# Patient Record
Sex: FEMALE | Race: WHITE | NOT HISPANIC OR LATINO
[De-identification: names, ages, dates, MRNs, and addresses within clinical notes are randomized per-mention and may not be internally consistent; named-entity substitution may affect disease eponyms.]

---

## 2017-02-28 ENCOUNTER — APPOINTMENT (OUTPATIENT)
Dept: OPHTHALMOLOGY | Facility: CLINIC | Age: 47
End: 2017-02-28

## 2020-09-17 ENCOUNTER — INPATIENT (INPATIENT)
Facility: HOSPITAL | Age: 50
LOS: 1 days | Discharge: HOME | End: 2020-09-19
Attending: INTERNAL MEDICINE | Admitting: INTERNAL MEDICINE
Payer: MEDICAID

## 2020-09-17 VITALS
OXYGEN SATURATION: 100 % | TEMPERATURE: 97 F | RESPIRATION RATE: 18 BRPM | HEART RATE: 94 BPM | SYSTOLIC BLOOD PRESSURE: 188 MMHG | DIASTOLIC BLOOD PRESSURE: 64 MMHG

## 2020-09-17 DIAGNOSIS — Z90.79 ACQUIRED ABSENCE OF OTHER GENITAL ORGAN(S): Chronic | ICD-10-CM

## 2020-09-17 DIAGNOSIS — Z90.49 ACQUIRED ABSENCE OF OTHER SPECIFIED PARTS OF DIGESTIVE TRACT: Chronic | ICD-10-CM

## 2020-09-17 DIAGNOSIS — E87.1 HYPO-OSMOLALITY AND HYPONATREMIA: ICD-10-CM

## 2020-09-17 DIAGNOSIS — Z90.09 ACQUIRED ABSENCE OF OTHER PART OF HEAD AND NECK: Chronic | ICD-10-CM

## 2020-09-17 DIAGNOSIS — Z29.9 ENCOUNTER FOR PROPHYLACTIC MEASURES, UNSPECIFIED: ICD-10-CM

## 2020-09-17 DIAGNOSIS — I10 ESSENTIAL (PRIMARY) HYPERTENSION: ICD-10-CM

## 2020-09-17 DIAGNOSIS — E11.9 TYPE 2 DIABETES MELLITUS WITHOUT COMPLICATIONS: ICD-10-CM

## 2020-09-17 DIAGNOSIS — R10.9 UNSPECIFIED ABDOMINAL PAIN: ICD-10-CM

## 2020-09-17 DIAGNOSIS — E89.0 POSTPROCEDURAL HYPOTHYROIDISM: ICD-10-CM

## 2020-09-17 DIAGNOSIS — Z02.9 ENCOUNTER FOR ADMINISTRATIVE EXAMINATIONS, UNSPECIFIED: ICD-10-CM

## 2020-09-17 LAB
ALBUMIN SERPL ELPH-MCNC: 4.8 G/DL — SIGNIFICANT CHANGE UP (ref 3.3–5)
ALP SERPL-CCNC: 88 U/L — SIGNIFICANT CHANGE UP (ref 40–120)
ALT FLD-CCNC: 15 U/L — SIGNIFICANT CHANGE UP (ref 4–33)
ANION GAP SERPL CALC-SCNC: 14 MMO/L — SIGNIFICANT CHANGE UP (ref 7–14)
ANION GAP SERPL CALC-SCNC: 20 MMO/L — HIGH (ref 7–14)
ANION GAP SERPL CALC-SCNC: 23 MMO/L — HIGH (ref 7–14)
APPEARANCE UR: CLEAR — SIGNIFICANT CHANGE UP
AST SERPL-CCNC: 16 U/L — SIGNIFICANT CHANGE UP (ref 4–32)
BASE EXCESS BLDV CALC-SCNC: -1.6 MMOL/L — SIGNIFICANT CHANGE UP
BILIRUB SERPL-MCNC: 0.4 MG/DL — SIGNIFICANT CHANGE UP (ref 0.2–1.2)
BILIRUB UR-MCNC: NEGATIVE — SIGNIFICANT CHANGE UP
BLOOD GAS VENOUS - CREATININE: 1.23 MG/DL — SIGNIFICANT CHANGE UP (ref 0.5–1.3)
BLOOD GAS VENOUS - FIO2: 21 — SIGNIFICANT CHANGE UP
BLOOD UR QL VISUAL: NEGATIVE — SIGNIFICANT CHANGE UP
BUN SERPL-MCNC: 12 MG/DL — SIGNIFICANT CHANGE UP (ref 7–23)
BUN SERPL-MCNC: 13 MG/DL — SIGNIFICANT CHANGE UP (ref 7–23)
BUN SERPL-MCNC: 16 MG/DL — SIGNIFICANT CHANGE UP (ref 7–23)
CALCIUM SERPL-MCNC: 8.4 MG/DL — SIGNIFICANT CHANGE UP (ref 8.4–10.5)
CALCIUM SERPL-MCNC: 8.8 MG/DL — SIGNIFICANT CHANGE UP (ref 8.4–10.5)
CALCIUM SERPL-MCNC: 9 MG/DL — SIGNIFICANT CHANGE UP (ref 8.4–10.5)
CHLORIDE BLDV-SCNC: 86 MMOL/L — LOW (ref 96–108)
CHLORIDE SERPL-SCNC: 75 MMOL/L — LOW (ref 98–107)
CHLORIDE SERPL-SCNC: 81 MMOL/L — LOW (ref 98–107)
CHLORIDE SERPL-SCNC: 96 MMOL/L — LOW (ref 98–107)
CO2 SERPL-SCNC: 19 MMOL/L — LOW (ref 22–31)
CO2 SERPL-SCNC: 21 MMOL/L — LOW (ref 22–31)
CO2 SERPL-SCNC: 23 MMOL/L — SIGNIFICANT CHANGE UP (ref 22–31)
COLOR SPEC: COLORLESS — SIGNIFICANT CHANGE UP
CREAT ?TM UR-MCNC: 22.8 MG/DL — SIGNIFICANT CHANGE UP
CREAT SERPL-MCNC: 1.03 MG/DL — SIGNIFICANT CHANGE UP (ref 0.5–1.3)
CREAT SERPL-MCNC: 1.18 MG/DL — SIGNIFICANT CHANGE UP (ref 0.5–1.3)
CREAT SERPL-MCNC: 1.22 MG/DL — SIGNIFICANT CHANGE UP (ref 0.5–1.3)
GAS PNL BLDV: 119 MMOL/L — CRITICAL LOW (ref 136–146)
GLUCOSE BLDC GLUCOMTR-MCNC: 157 MG/DL — HIGH (ref 70–99)
GLUCOSE BLDV-MCNC: 222 MG/DL — HIGH (ref 70–99)
GLUCOSE SERPL-MCNC: 218 MG/DL — HIGH (ref 70–99)
GLUCOSE SERPL-MCNC: 229 MG/DL — HIGH (ref 70–99)
GLUCOSE SERPL-MCNC: 250 MG/DL — HIGH (ref 70–99)
GLUCOSE UR-MCNC: 300 — HIGH
HCG UR-SCNC: NEGATIVE — SIGNIFICANT CHANGE UP
HCO3 BLDV-SCNC: 23 MMOL/L — SIGNIFICANT CHANGE UP (ref 20–27)
HCT VFR BLD CALC: 36.8 % — SIGNIFICANT CHANGE UP (ref 34.5–45)
HCT VFR BLDV CALC: 38.1 % — SIGNIFICANT CHANGE UP (ref 34.5–45)
HGB BLD-MCNC: 12.5 G/DL — SIGNIFICANT CHANGE UP (ref 11.5–15.5)
HGB BLDV-MCNC: 12.4 G/DL — SIGNIFICANT CHANGE UP (ref 11.5–15.5)
KETONES UR-MCNC: SIGNIFICANT CHANGE UP
LACTATE BLDV-MCNC: 3.4 MMOL/L — HIGH (ref 0.5–2)
LACTATE BLDV-MCNC: 4.9 MMOL/L — CRITICAL HIGH (ref 0.5–2)
LEUKOCYTE ESTERASE UR-ACNC: NEGATIVE — SIGNIFICANT CHANGE UP
MAGNESIUM SERPL-MCNC: 1.3 MG/DL — LOW (ref 1.6–2.6)
MAGNESIUM SERPL-MCNC: 1.7 MG/DL — SIGNIFICANT CHANGE UP (ref 1.6–2.6)
MCHC RBC-ENTMCNC: 28.4 PG — SIGNIFICANT CHANGE UP (ref 27–34)
MCHC RBC-ENTMCNC: 34 % — SIGNIFICANT CHANGE UP (ref 32–36)
MCV RBC AUTO: 83.6 FL — SIGNIFICANT CHANGE UP (ref 80–100)
NITRITE UR-MCNC: NEGATIVE — SIGNIFICANT CHANGE UP
NRBC # FLD: 0 K/UL — SIGNIFICANT CHANGE UP (ref 0–0)
OSMOLALITY UR: 144 MOSMO/KG — SIGNIFICANT CHANGE UP (ref 50–1200)
PCO2 BLDV: 40 MMHG — LOW (ref 41–51)
PH BLDV: 7.38 PH — SIGNIFICANT CHANGE UP (ref 7.32–7.43)
PH UR: 6.5 — SIGNIFICANT CHANGE UP (ref 5–8)
PHOSPHATE SERPL-MCNC: 2.2 MG/DL — LOW (ref 2.5–4.5)
PHOSPHATE SERPL-MCNC: 2.6 MG/DL — SIGNIFICANT CHANGE UP (ref 2.5–4.5)
PLATELET # BLD AUTO: 259 K/UL — SIGNIFICANT CHANGE UP (ref 150–400)
PMV BLD: 12.6 FL — SIGNIFICANT CHANGE UP (ref 7–13)
PO2 BLDV: 39 MMHG — SIGNIFICANT CHANGE UP (ref 35–40)
POTASSIUM BLDV-SCNC: 3.2 MMOL/L — LOW (ref 3.4–4.5)
POTASSIUM SERPL-MCNC: 3.2 MMOL/L — LOW (ref 3.5–5.3)
POTASSIUM SERPL-MCNC: 4 MMOL/L — SIGNIFICANT CHANGE UP (ref 3.5–5.3)
POTASSIUM SERPL-MCNC: 4 MMOL/L — SIGNIFICANT CHANGE UP (ref 3.5–5.3)
POTASSIUM SERPL-SCNC: 3.2 MMOL/L — LOW (ref 3.5–5.3)
POTASSIUM SERPL-SCNC: 4 MMOL/L — SIGNIFICANT CHANGE UP (ref 3.5–5.3)
POTASSIUM SERPL-SCNC: 4 MMOL/L — SIGNIFICANT CHANGE UP (ref 3.5–5.3)
PROT SERPL-MCNC: 8 G/DL — SIGNIFICANT CHANGE UP (ref 6–8.3)
PROT UR-MCNC: NEGATIVE — SIGNIFICANT CHANGE UP
RBC # BLD: 4.4 M/UL — SIGNIFICANT CHANGE UP (ref 3.8–5.2)
RBC # FLD: 12.5 % — SIGNIFICANT CHANGE UP (ref 10.3–14.5)
SAO2 % BLDV: 69.2 % — SIGNIFICANT CHANGE UP (ref 60–85)
SARS-COV-2 RNA SPEC QL NAA+PROBE: SIGNIFICANT CHANGE UP
SODIUM SERPL-SCNC: 119 MMOL/L — CRITICAL LOW (ref 135–145)
SODIUM SERPL-SCNC: 120 MMOL/L — CRITICAL LOW (ref 135–145)
SODIUM SERPL-SCNC: 133 MMOL/L — LOW (ref 135–145)
SODIUM UR-SCNC: 21 MMOL/L — SIGNIFICANT CHANGE UP
SP GR SPEC: 1 — SIGNIFICANT CHANGE UP (ref 1–1.04)
SP GR UR: 1 — LOW (ref 1–1.04)
UROBILINOGEN FLD QL: NORMAL — SIGNIFICANT CHANGE UP
WBC # BLD: 14.85 K/UL — HIGH (ref 3.8–10.5)
WBC # FLD AUTO: 14.85 K/UL — HIGH (ref 3.8–10.5)

## 2020-09-17 PROCEDURE — 99285 EMERGENCY DEPT VISIT HI MDM: CPT

## 2020-09-17 PROCEDURE — 74176 CT ABD & PELVIS W/O CONTRAST: CPT | Mod: 26

## 2020-09-17 PROCEDURE — 99223 1ST HOSP IP/OBS HIGH 75: CPT | Mod: GC

## 2020-09-17 RX ORDER — POTASSIUM CHLORIDE 20 MEQ
40 PACKET (EA) ORAL EVERY 4 HOURS
Refills: 0 | Status: COMPLETED | OUTPATIENT
Start: 2020-09-17 | End: 2020-09-17

## 2020-09-17 RX ORDER — DILTIAZEM HCL 120 MG
180 CAPSULE, EXT RELEASE 24 HR ORAL DAILY
Refills: 0 | Status: DISCONTINUED | OUTPATIENT
Start: 2020-09-17 | End: 2020-09-19

## 2020-09-17 RX ORDER — DEXTROSE 50 % IN WATER 50 %
12.5 SYRINGE (ML) INTRAVENOUS ONCE
Refills: 0 | Status: DISCONTINUED | OUTPATIENT
Start: 2020-09-17 | End: 2020-09-19

## 2020-09-17 RX ORDER — INFLUENZA VIRUS VACCINE 15; 15; 15; 15 UG/.5ML; UG/.5ML; UG/.5ML; UG/.5ML
0.5 SUSPENSION INTRAMUSCULAR ONCE
Refills: 0 | Status: DISCONTINUED | OUTPATIENT
Start: 2020-09-17 | End: 2020-09-19

## 2020-09-17 RX ORDER — HEPARIN SODIUM 5000 [USP'U]/ML
5000 INJECTION INTRAVENOUS; SUBCUTANEOUS EVERY 8 HOURS
Refills: 0 | Status: DISCONTINUED | OUTPATIENT
Start: 2020-09-17 | End: 2020-09-19

## 2020-09-17 RX ORDER — MORPHINE SULFATE 50 MG/1
2 CAPSULE, EXTENDED RELEASE ORAL ONCE
Refills: 0 | Status: DISCONTINUED | OUTPATIENT
Start: 2020-09-17 | End: 2020-09-17

## 2020-09-17 RX ORDER — INSULIN LISPRO 100/ML
VIAL (ML) SUBCUTANEOUS
Refills: 0 | Status: DISCONTINUED | OUTPATIENT
Start: 2020-09-17 | End: 2020-09-19

## 2020-09-17 RX ORDER — LOSARTAN POTASSIUM 100 MG/1
100 TABLET, FILM COATED ORAL DAILY
Refills: 0 | Status: DISCONTINUED | OUTPATIENT
Start: 2020-09-17 | End: 2020-09-19

## 2020-09-17 RX ORDER — DEXTROSE 50 % IN WATER 50 %
25 SYRINGE (ML) INTRAVENOUS ONCE
Refills: 0 | Status: DISCONTINUED | OUTPATIENT
Start: 2020-09-17 | End: 2020-09-19

## 2020-09-17 RX ORDER — LEVOTHYROXINE SODIUM 125 MCG
150 TABLET ORAL DAILY
Refills: 0 | Status: DISCONTINUED | OUTPATIENT
Start: 2020-09-17 | End: 2020-09-17

## 2020-09-17 RX ORDER — SODIUM CHLORIDE 9 MG/ML
1000 INJECTION, SOLUTION INTRAVENOUS
Refills: 0 | Status: DISCONTINUED | OUTPATIENT
Start: 2020-09-17 | End: 2020-09-19

## 2020-09-17 RX ORDER — LEVOTHYROXINE SODIUM 125 MCG
150 TABLET ORAL
Refills: 0 | Status: DISCONTINUED | OUTPATIENT
Start: 2020-09-17 | End: 2020-09-19

## 2020-09-17 RX ORDER — GLUCAGON INJECTION, SOLUTION 0.5 MG/.1ML
1 INJECTION, SOLUTION SUBCUTANEOUS ONCE
Refills: 0 | Status: DISCONTINUED | OUTPATIENT
Start: 2020-09-17 | End: 2020-09-19

## 2020-09-17 RX ORDER — CEFTRIAXONE 500 MG/1
1000 INJECTION, POWDER, FOR SOLUTION INTRAMUSCULAR; INTRAVENOUS ONCE
Refills: 0 | Status: COMPLETED | OUTPATIENT
Start: 2020-09-17 | End: 2020-09-17

## 2020-09-17 RX ORDER — SODIUM CHLORIDE 9 MG/ML
1000 INJECTION, SOLUTION INTRAVENOUS ONCE
Refills: 0 | Status: COMPLETED | OUTPATIENT
Start: 2020-09-17 | End: 2020-09-17

## 2020-09-17 RX ORDER — IBUPROFEN 200 MG
400 TABLET ORAL ONCE
Refills: 0 | Status: COMPLETED | OUTPATIENT
Start: 2020-09-17 | End: 2020-09-17

## 2020-09-17 RX ORDER — MAGNESIUM SULFATE 500 MG/ML
1 VIAL (ML) INJECTION ONCE
Refills: 0 | Status: COMPLETED | OUTPATIENT
Start: 2020-09-17 | End: 2020-09-17

## 2020-09-17 RX ORDER — LEVOTHYROXINE SODIUM 125 MCG
175 TABLET ORAL
Refills: 0 | Status: DISCONTINUED | OUTPATIENT
Start: 2020-09-17 | End: 2020-09-19

## 2020-09-17 RX ORDER — ACETAMINOPHEN 500 MG
650 TABLET ORAL ONCE
Refills: 0 | Status: COMPLETED | OUTPATIENT
Start: 2020-09-17 | End: 2020-09-17

## 2020-09-17 RX ORDER — SODIUM CHLORIDE 9 MG/ML
1000 INJECTION, SOLUTION INTRAVENOUS
Refills: 0 | Status: DISCONTINUED | OUTPATIENT
Start: 2020-09-17 | End: 2020-09-18

## 2020-09-17 RX ORDER — INSULIN LISPRO 100/ML
VIAL (ML) SUBCUTANEOUS AT BEDTIME
Refills: 0 | Status: DISCONTINUED | OUTPATIENT
Start: 2020-09-17 | End: 2020-09-19

## 2020-09-17 RX ORDER — DEXTROSE 50 % IN WATER 50 %
15 SYRINGE (ML) INTRAVENOUS ONCE
Refills: 0 | Status: DISCONTINUED | OUTPATIENT
Start: 2020-09-17 | End: 2020-09-19

## 2020-09-17 RX ORDER — SODIUM CHLORIDE 9 MG/ML
1000 INJECTION INTRAMUSCULAR; INTRAVENOUS; SUBCUTANEOUS ONCE
Refills: 0 | Status: COMPLETED | OUTPATIENT
Start: 2020-09-17 | End: 2020-09-17

## 2020-09-17 RX ADMIN — CEFTRIAXONE 100 MILLIGRAM(S): 500 INJECTION, POWDER, FOR SOLUTION INTRAMUSCULAR; INTRAVENOUS at 03:33

## 2020-09-17 RX ADMIN — MORPHINE SULFATE 2 MILLIGRAM(S): 50 CAPSULE, EXTENDED RELEASE ORAL at 06:27

## 2020-09-17 RX ADMIN — Medication 650 MILLIGRAM(S): at 04:04

## 2020-09-17 RX ADMIN — Medication 180 MILLIGRAM(S): at 21:14

## 2020-09-17 RX ADMIN — SODIUM CHLORIDE 1000 MILLILITER(S): 9 INJECTION, SOLUTION INTRAVENOUS at 05:53

## 2020-09-17 RX ADMIN — Medication 40 MILLIEQUIVALENT(S): at 04:52

## 2020-09-17 RX ADMIN — Medication 40 MILLIEQUIVALENT(S): at 09:33

## 2020-09-17 RX ADMIN — Medication 650 MILLIGRAM(S): at 03:32

## 2020-09-17 RX ADMIN — SODIUM CHLORIDE 1000 MILLILITER(S): 9 INJECTION INTRAMUSCULAR; INTRAVENOUS; SUBCUTANEOUS at 03:33

## 2020-09-17 RX ADMIN — SODIUM CHLORIDE 1000 MILLILITER(S): 9 INJECTION, SOLUTION INTRAVENOUS at 05:57

## 2020-09-17 RX ADMIN — SODIUM CHLORIDE 1000 MILLILITER(S): 9 INJECTION, SOLUTION INTRAVENOUS at 07:28

## 2020-09-17 RX ADMIN — CEFTRIAXONE 1000 MILLIGRAM(S): 500 INJECTION, POWDER, FOR SOLUTION INTRAMUSCULAR; INTRAVENOUS at 04:04

## 2020-09-17 RX ADMIN — MORPHINE SULFATE 2 MILLIGRAM(S): 50 CAPSULE, EXTENDED RELEASE ORAL at 05:53

## 2020-09-17 RX ADMIN — SODIUM CHLORIDE 1000 MILLILITER(S): 9 INJECTION, SOLUTION INTRAVENOUS at 04:03

## 2020-09-17 RX ADMIN — SODIUM CHLORIDE 1000 MILLILITER(S): 9 INJECTION INTRAMUSCULAR; INTRAVENOUS; SUBCUTANEOUS at 04:56

## 2020-09-17 RX ADMIN — SODIUM CHLORIDE 50 MILLILITER(S): 9 INJECTION, SOLUTION INTRAVENOUS at 23:10

## 2020-09-17 RX ADMIN — Medication 100 GRAM(S): at 07:02

## 2020-09-17 RX ADMIN — HEPARIN SODIUM 5000 UNIT(S): 5000 INJECTION INTRAVENOUS; SUBCUTANEOUS at 21:13

## 2020-09-17 NOTE — H&P ADULT - PROBLEM SELECTOR PLAN 2
- Recently diagnosed with UTI, treated with 10 days of nitrofurantoin. Symptoms persisted and patient was started on ciprofloxacin, on cipro for 2 days.   - dysuria resolved but now is having some mild flank pain/CVA tenderness concerning for pyeloneprhitis  - UA negative   - UCx pending  - CT a/p without contrast showed no stones, mild R hydronephrosis. ?pyelonephritis.   - received one dose of ceftriaxone in ED - Recently diagnosed with UTI, treated with 10 days of nitrofurantoin. Symptoms persisted and patient was started on ciprofloxacin, on cipro for 2 days.   -dysuria resolved but now is having some mild flank pain/CVA tenderness concerning for pyelonephritis  - UA negative   - UCx pending  - CT a/p without contrast showed no stones, mild R hydronephrosis. Unable to assess for pyelonephritis because noncontrast study.  - received one dose of ceftriaxone in ED. Will observe off antibiotics for now, given mild flank pain, negative UA.

## 2020-09-17 NOTE — ED ADULT NURSE REASSESSMENT NOTE - NS ED NURSE REASSESS COMMENT FT1
covering primary RN for break pt is in bed A and XO 3 in NAD resting comfortably, endorses no complaints, comfort measures provided, bed assignment noted

## 2020-09-17 NOTE — H&P ADULT - PROBLEM SELECTOR PLAN 1
Na 119 on admission, s/p 2L LR and 1L NS, urine sodium 41 and osmolality 144.  - Possibly due to increased fluid intake vs HCTZ use.   - Na 119 on admission, s/p 2L LR and 1L NS, urine sodium 41 and osmolality 144.  - Possibly due to increased fluid intake vs HCTZ use.   -Urine Na 21 and osmolality 144 Na 119 on admission, s/p 2L LR and 1L NS, urine sodium 41 and osmolality 144.  - Likely due to HCTZ use, but primary polydipsia may be contributory as well.  -Urine Na 21 and osmolality 144, after 2L LR and 1L NS.  - Will hold HCTZ  - Will observe off fluids  - Repeat urine Na and osmolality, BMP Na 119 on admission, s/p 2L LR and 1L NS, urine sodium 41 and osmolality 144.  - Likely due to HCTZ use, but primary polydipsia may be contributory as well.  -Urine Na 21 and osmolality 144, after 2L LR and 1L NS.  - Will hold HCTZ  - Will observe off IV fluids  - Repeat urine Na and osmolality, BMP

## 2020-09-17 NOTE — ED PROVIDER NOTE - ATTENDING CONTRIBUTION TO CARE
HPI: 49 yo F with Past Medical History known heart murmur, CKD (last Cr one month ago was 1.3), T2DM (on insulin, metformin, glyburide) HTN on HCTZ and lisinopril, previous thyroid CA s/p thyroidectomy on levothyroxine 150 mch 5 days a week and 175 mcg weekends that is presenting to the ED with headache, back pain, and dysuria 2 weeks after being diagnosed with UTI by her PCP. Patient was given a 10 day course of nitrofurantoin, which she completed, and started on oral cipro 2 days ago which she has been compliant with. She came to the ED after developing headache, bilateral lower back pain, lightheadedness, and chills without fever. Her back pain is constant. non radiating, bilateral flanks, dull, achy. Headache, right posterior without vision/hearing changes, numbness/tingling, weakness. She stopped menstruating 3 years ago. She still has dysuria, but denies any blood in the urine. no vaginal discharge.   EXAM: NAD, heart with systolic murmur, lungs ctab, no CVAT bilaterally, abd soft nontender, no rebound/guarding, no signs trauma or signs of cirrhosis, BLE without edema. pulses palpable x 4 and equal.   MDM: pt with multiple medical problems that presents with bilateral flank/back pain and dysuria and chills s/p 1 full course of Macrobid x 10 days, continued to have symptoms so PMD Rx'd cipro which her urine culture shows she is sensitive to but has taken 2 days worth of meds but now with worsening symptoms. Most likely pyelo given new and developing symptoms, Will obtain UA, culture, labs, and provide IVF and meds and reassess.

## 2020-09-17 NOTE — H&P ADULT - PROBLEM SELECTOR PLAN 7
Transitions of Care Status:  1.  Name of PCP: Dr. Aurelia Martínez  2.  PCP Contacted on Admission: [ ] Y    [ ] N    3.  PCP contacted at Discharge: [ ] Y    [ ] N    [ ] N/A  4.  Post-Discharge Appointment Date and Location:  5.  Summary of Handoff given to PCP:

## 2020-09-17 NOTE — H&P ADULT - ATTENDING COMMENTS
50 W T2DM, essential HTN, hypothyroidism p/w HA, nausea, flank pain. Found to have severe hyponatremia in setting of thiazide use. Minimal change in BMP after IVF. Case d/w nephro on call; recommends observing off fluids and then repeating BMP and urine lytes.     Patient is grossly euvolemic, AOx3, moving all extremities.     Patient afebrile and without CVA tenderness on exam. Low suspicion for pyelonephritis. Defer further abx.

## 2020-09-17 NOTE — ED ADULT TRIAGE NOTE - CHIEF COMPLAINT QUOTE
pt c/o having lower back pain starting yesterday. pt was dx with UTI and is on Ciprofloxacin since last Monday. pmh htn, dm, thyroid cancer

## 2020-09-17 NOTE — H&P ADULT - NSHPREVIEWOFSYSTEMS_GEN_ALL_CORE
REVIEW OF SYSTEMS:  CONSTITUTIONAL: No fever, chills, night sweats, or fatigue  EYES: No eye pain, visual disturbances, or discharge  ENMT:  No difficulty hearing, tinnitus, vertigo; No sinus or throat pain  NECK: No pain or stiffness  BREASTS: No pain, masses, or nipple discharge  RESPIRATORY: No cough, wheezing, or hemoptysis; No shortness of breath  CARDIOVASCULAR: No chest pain, palpitations, dizziness, or leg swelling  GASTROINTESTINAL: Nausea but no vomiting, No abdominal or epigastric pain. No hematemesis; No diarrhea or constipation. No melena or hematochezia.  GENITOURINARY: No dysuria, increased frequency, hematuria, or incontinence  NEUROLOGICAL: R sided headache, No memory loss, loss of strength, numbness, or tremors  SKIN: No itching, burning, rashes, or lesions   LYMPH NODES: No enlarged glands  ENDOCRINE: No heat or cold intolerance; No hair loss  MUSCULOSKELETAL: No joint pain or swelling; No muscle, back, or extremity pain  PSYCHIATRIC: No depression, anxiety, mood swings, or difficulty sleeping  HEME/LYMPH: No easy bruising, or bleeding gums  ALLERY AND IMMUNOLOGIC: No hives or eczema REVIEW OF SYSTEMS:  CONSTITUTIONAL: No fever, chills, night sweats, or fatigue  EYES: No eye pain, visual disturbances, or discharge  ENMT:  No difficulty hearing, tinnitus, vertigo; No sinus or throat pain  NECK: No pain or stiffness  BREASTS: No pain, masses, or nipple discharge  RESPIRATORY: No cough, wheezing, or hemoptysis; No shortness of breath  CARDIOVASCULAR: No chest pain, palpitations, dizziness, or leg swelling  GASTROINTESTINAL: Nausea but no vomiting, No abdominal or epigastric pain. No hematemesis; No diarrhea or constipation. No melena or hematochezia.  GENITOURINARY: No dysuria, increased frequency, hematuria, or incontinence  NEUROLOGICAL: R sided headache, No memory loss, loss of strength, numbness, or tremors  SKIN: No itching, burning, rashes, or lesions   LYMPH NODES: No enlarged glands  ENDOCRINE: No heat or cold intolerance; No hair loss  MUSCULOSKELETAL: No joint pain or swelling; No muscle, back, or extremity pain  PSYCHIATRIC: No depression, anxiety, mood swings, or difficulty sleeping  HEME/LYMPH: No easy bruising, or bleeding gums  ALLERGY AND IMMUNOLOGIC: No hives or eczema

## 2020-09-17 NOTE — ED PROVIDER NOTE - CLINICAL SUMMARY MEDICAL DECISION MAKING FREE TEXT BOX
50F w/ DB, HTN diagnosed with UTI s/p 10 day course of nitrofurantoin, 2 days of ciprofloxacin now with dysuria, headache, chills, back pain: physical exam reveals bilateral CVA tenderness. Differential includes pyelonephritis, uti, kidney stone. Given progression of illness, nonbloody urine, chills, nausea likely pyelonephritis.

## 2020-09-17 NOTE — ED ADULT NURSE NOTE - OBJECTIVE STATEMENT
Break covering RN: 51 y/o F received to room 29 c/o dysuria. Pt a&ox4 and ambulatory. Pt states she was dx with a UTI started on Nitrofurantoin and was switched to Cipro. pt states dysuria is worsening and endorses b/l flank pain. Pt endorses occipital lobe ha and nausea at this time. Pt endorses intermitent chills. Pt respirations even and unlabored. pt abdomen soft nontender nondistended. Pt skin intact. Pt states urine is clear and yellow. Pt denies hematuria, vaginal discharge, v/d, fevers, cough, sick contacts, or recent travel. Vital signs as noted, call bell in reach, md evaluated, comfort measures provided, will give report to primary RN.

## 2020-09-17 NOTE — ED PROVIDER NOTE - PHYSICAL EXAMINATION
PHYSICAL EXAM:  GENERAL: anxious, well-developed, non-toxic  HEAD:  Atraumatic, Normocephalic  EYES: EOMI, PERRLA, conjunctiva and sclera clear  NECK: Supple, No JVD  CHEST/LUNG: Clear to auscultation bilaterally; No wheeze  HEART: Regular rate and rhythm; systolic murmur (diagnosed 2008), no rubs, or gallops  ABDOMEN: Soft, Nontender, Nondistended; Bowel sounds present, cva tenderness bilaterally  EXTREMITIES:  2+ Peripheral Pulses, No clubbing, cyanosis, or edema  PSYCH: AAOx3  NEUROLOGY: non-focal  SKIN: No rashes or lesions

## 2020-09-17 NOTE — H&P ADULT - ASSESSMENT
50 y F with hx of T2DM, HTN, hypothyroidism due to thyroidectomy for thyroid cancer, presenting with headaches, nausea, and flank pain for about 2 days. Patient was recently diagnosed with a UTI and completed a 10 day course of nitrofurantoin. She is now taking oral ciprofloxacin. In the ED, patient found to be hyponatremic to Zb=145. Patient has been drinking more water recently for her UTI and has been taking HCTZ for her hypertension. CT a/p without contrast showed no stones but mild hydronephrosis. UA clear. Pt initially hypokalemic on admission, but K was repleted and normalized. Patient admitted for management of her hyponatremia.

## 2020-09-17 NOTE — ED PROVIDER NOTE - PROGRESS NOTE DETAILS
Spoke to patient regarding need to CT abdomen in setting of increasing lactate. Patient desires CT abdomen, but refuses contrast, as doctors previously told her to avoid it in the setting of previously elevated creatinine. Explained benefits of contrast and told patient her kidney function is good, but patient adamantly refuses contrast. For now, will obtain CT abdomen without contrast. YUKI: Pt now reports that since she was told she had UTI she started drinking more water than usual approx 8 bottles of 16 oz phelps per day which is more than her usual. This can be possible cause of her condition vs HCTZ use vs hypothyroidism although her recent TSH one month ago was 0.7 which she was told by her Oncologist was normal for her because of her previous thryoidectomy. Will admit to hospital for her hyponatremia.

## 2020-09-17 NOTE — H&P ADULT - NSICDXFAMILYHX_GEN_ALL_CORE_FT
FAMILY HISTORY:  Family history of diabetes mellitus, Father and mother  Family hx of hypertension, father and mother

## 2020-09-17 NOTE — ED PROVIDER NOTE - NSCAREINITIATED _GEN_ER
Contacted Dr. Eng office in regards to obtaining an order for a kneeling scooter. Spoke with Dr. Eng nurse [place], who stated they would get an order for the kneeling scooter and contact the patient about the location they would like to pick it up at.      Dinesh Kelley(Resident)

## 2020-09-17 NOTE — H&P ADULT - PROBLEM SELECTOR PLAN 3
- obtain Hgb A1c  - Lantus 18U, Humalog - obtain Hgb A1c  - Lantus 18U, Humalog  - ISS - obtain Hgb A1c  - Glargine 18U, Humalog premeals  - ISS

## 2020-09-17 NOTE — H&P ADULT - NSICDXPASTMEDICALHX_GEN_ALL_CORE_FT
PAST MEDICAL HISTORY:  Diabetes     Hypertension, unspecified type     Post-operative hypothyroidism

## 2020-09-17 NOTE — PATIENT PROFILE ADULT - [facility] [employer]
Problem: Patient Care Overview  Goal: Plan of Care Review  Outcome: Ongoing (interventions implemented as appropriate)   18   Coping/Psychosocial   Plan of Care Reviewed With mother   Coping/Psychosocial   Patient Agreement with Plan of Care agrees   Plan of Care Review   Progress no change     Goal: Discharge Needs Assessment  Outcome: Ongoing (interventions implemented as appropriate)   18   Discharge Needs Assessment   Readmission Within the Last 30 Days no previous admission in last 30 days       Problem: Substance Exposed/ Abstinence (Pediatric,,NICU)  Goal: Identify Related Risk Factors and Signs and Symptoms  Outcome: Ongoing (interventions implemented as appropriate)   18   Substance Exposed/ Abstinence (Pediatric,[facility],NICU)   Related Risk Factors (Substance Exposed/ Abstinence) maternal substance use   Signs and Symptoms (Substance Exposed/ Abstinence) tight muscle tone;restlessness;irritability;sneezing;jitteriness/tremors;high-pitched/prolonged cry     Goal: Adequate Sleep and Nutrition to Enable Consistent Weight Gain  Outcome: Ongoing (interventions implemented as appropriate)   18   Substance Exposed/ Abstinence (Pediatric,,NICU)   Adequate Sleep and Nutrition to Enable Consistent Weight Gain making progress toward outcome     Goal: Integration Into Biopsychosocial Environment  Outcome: Ongoing (interventions implemented as appropriate)   18   Substance Exposed/ Abstinence (Pediatric,,NICU)   Integration Into Biopsychosocial Environment making progress toward outcome       Problem: [facility] (,NICU)  Goal: Signs and Symptoms of Listed Potential Problems Will be Absent, Minimized or Managed ([facility])  Outcome: Ongoing (interventions implemented as appropriate)   18   Goal/Outcome Evaluation   Problems Assessed () all   Problems Present ([facility])  situational response          (4) no limitation

## 2020-09-17 NOTE — ED PROVIDER NOTE - OBJECTIVE STATEMENT
50F w/ T2DM (on insulin, metformin, glyburide) and HTN presenting to the ED with headache, back pain, and dysuria 2 weeks after being diagnosed with UTI by her PCP. Patient was given a 10 day course of nitrofurantoin, which she completed, and started on oral cipro. She came to the ED after developing headache, bilateral lower back pain, lightheadedness, and chills. Her back pain is constant. She stopped menstruating 3 years ago. She still has dysuria, but denies any blood in the urine. 50F w/ T2DM (on insulin, metformin, glyburide) and HTN presenting to the ED with headache, back pain, and dysuria 2 weeks after being diagnosed with UTI by her PCP. Patient was given a 10 day course of nitrofurantoin, which she completed, and started on oral cipro. She came to the ED after developing headache, bilateral lower back pain, lightheadedness, and chills. Her back pain is constant. She stopped menstruating 3 years ago. She still has dysuria, but denies any blood in the urine.    PMD: Aurelia Martínez

## 2020-09-17 NOTE — H&P ADULT - NSICDXPASTSURGICALHX_GEN_ALL_CORE_FT
PAST SURGICAL HISTORY:  H/O thyroidectomy     History of cholecystectomy     History of salpingoophorectomy

## 2020-09-17 NOTE — ED ADULT NURSE REASSESSMENT NOTE - NS ED NURSE REASSESS COMMENT FT1
Pt a&xo3, admitted for hyponatremia, endorsing discomfort to abdomen and back of head, md made aware and at bedside no further intervention at this time, vs taken and documented, breathing even and unlabored, ivl clear and patent, will continue to monitor.

## 2020-09-17 NOTE — ED ADULT NURSE REASSESSMENT NOTE - NS ED NURSE REASSESS COMMENT FT1
Pt a&ox3, in NAD, breathing even and unlabored, denies any present discomfort, CT came back negative, tolerated PO intake, will continue to monitor.

## 2020-09-17 NOTE — H&P ADULT - NSHPPHYSICALEXAM_GEN_ALL_CORE
PHYSICAL EXAM:  GENERAL: NAD, well-groomed, well-developed  HEAD:  Atraumatic, Normocephalic  EYES: EOMI, PERRLA, conjunctiva and sclera clear  ENMT: No tonsillar erythema, exudates, or enlargement; Moist mucous membranes, Good dentition, No lesions  NECK: Supple, No JVD, Normal thyroid  CHEST/LUNG: Clear to percussion bilaterally; No rales, rhonchi, wheezing, or rubs  HEART: Early systolic murmur heard over L sternal border, regular rate and rhythm; No rubs or gallops  ABDOMEN: Mild flank pain, mild CVA tenderness, Soft, Nondistended; Bowel sounds present  VASCULAR:  2+ Peripheral Pulses, No clubbing, cyanosis, or edema  LYMPH: No lymphadenopathy noted  SKIN: No rashes or lesions  NERVOUS SYSTEM:  Alert & Oriented X3, Good concentration; Motor Strength 5/5 B/L upper and lower extremities

## 2020-09-17 NOTE — ED ADULT NURSE NOTE - TEMPLATE LIST FOR HEAD TO TOE ASSESSMENT
Arterial    Diagnosis: pheochromocytoma    Patient location during procedure: done in OR  Procedure start time: 11/16/2018 9:26 AM  Timeout: 11/16/2018 9:20 AM  Procedure end time: 11/16/2018 9:28 AM  Staffing  Anesthesiologist: Ricky Rojas MD  Resident/CRNA: Mary Kay Jaramillo MD  Performed: resident/CRNA   Anesthesiologist was present at the time of the procedure.  Preanesthetic Checklist  Completed: patient identified, site marked, surgical consent, pre-op evaluation, timeout performed, IV checked, risks and benefits discussed, monitors and equipment checked and anesthesia consent givenArterial  Skin Prep: chlorhexidine gluconate  Local Infiltration: none  Orientation: left  Location: radial  Catheter Size: 20 G  Catheter placement by Ultrasound guidance. Heme positive aspiration all ports.  Vessel Caliber: small, patent  Needle advanced into vessel with real time Ultrasound guidance.Insertion Attempts: 1  Assessment  Dressing: secured with tape and tegaderm  Patient: Tolerated well             General

## 2020-09-17 NOTE — H&P ADULT - HISTORY OF PRESENT ILLNESS
50 y F with hx of T2DM, HTN, previous thyroid carcinoma s/p thyroidectomy in 2008, presenting with headache, lower back and bilateral flank pain. Around 2 weeks ago, patient was experiencing dysuria so she went to her PCP who prescribed her nitrofurantoin for a total of 10 days. Patient completed the course and soon after began experiencing some flank and lower back pain, along with some nausea. Patient went to her doctor friend who prescribed her ciprofloxacin 500mg BID which she started taking. Patient started taking cipro for about 2 days before she started feeling increasing nausea and pain. Patient denied having any more dysuria, no bowel changes, chest pain, SOB, dizziness, or vomiting. Headache is unilateral, but mild with no vision/hearing changes, no focal neurological deficiencies. No swelling in the lower extremities. Patient endorsed drinking more water recently which she felt helped her UTI. Patient says she takes all her medications everyday, including the HCTZ.

## 2020-09-17 NOTE — H&P ADULT - NSHPLABSRESULTS_GEN_ALL_CORE
120<LL>  |  81<L>  |  12  ----------------------------<  218<H>  4.0   |  19<L>  |  1.03      119<LL>  |  75<L>  |  13  ----------------------------<  250<H>  3.2<L>   |  21<L>  |  1.18    Ca    8.4      17 Sep 2020 04:50  Ca    9.0      17 Sep 2020 03:00  Phos  2.6       Mg     1.3         TPro  8.0  /  Alb  4.8  /  TBili  0.4  /  DBili  x   /  AST  16  /  ALT  15  /  AlkPhos  88      Magnesium, Serum: 1.3 mg/dL (20 @ 04:50)    Phosphorus Level, Serum: 2.6 mg/dL (20 @ 04:50)                    Urinalysis Basic - ( 17 Sep 2020 03:00 )    Color: COLORLESS / Appearance: CLEAR / S.005 / pH: 6.5  Gluc: 300 / Ketone: TRACE  / Bili: NEGATIVE / Urobili: NORMAL   Blood: NEGATIVE / Protein: NEGATIVE / Nitrite: NEGATIVE   Leuk Esterase: NEGATIVE / RBC: x / WBC x   Sq Epi: x / Non Sq Epi: x / Bacteria: x                              12.5   14.85 )-----------( 259      ( 17 Sep 2020 03:00 )             36.8     CAPILLARY BLOOD GLUCOSE      IMAGING  < from: CT Abdomen and Pelvis No Cont (20 @ 07:55) >    EXAM:  CT ABDOMEN AND PELVIS        PROCEDURE DATE:  Sep 17 2020         INTERPRETATION:  CLINICAL INFORMATION: Abdominal pain. Diagnosed with UTI, not responding to antibiotics.    COMPARISON: None.    PROCEDURE:  CT of the Abdomen and Pelvis was performed without intravenous contrast.  Intravenous contrast: None.  Oral contrast: None.  Sagittal and coronal reformats were performed.    FINDINGS:  LOWER CHEST: Left lower lobe calcified granuloma.    LIVER: Steatosis.  BILE DUCTS: Normal caliber.  GALLBLADDER: Not visualized.  SPLEEN: Within normal limits.  PANCREAS: Within normal limits.  ADRENALS: Within normal limits.  KIDNEYS/URETERS: No obstructing renal calculus. Mild hydronephrosis on the right.    BLADDER: Within normal limits.  REPRODUCTIVE ORGANS: Uterus is within normal limits. 1 cm cyst in the left ovary.    BOWEL: No bowel obstruction. Appendix is not visualized. No evidence of inflammation in the pericecal region.  PERITONEUM: No ascites.  VESSELS: Normal caliber abdominal aorta.  RETROPERITONEUM/LYMPH NODES: No lymphadenopathy.  ABDOMINAL WALL: Within normal limits.  BONES: Within normal limits.    IMPRESSION:    No ureteral stone. Mild hydronephrosis on the right of uncertain etiology.    < end of copied text >

## 2020-09-18 ENCOUNTER — TRANSCRIPTION ENCOUNTER (OUTPATIENT)
Age: 50
End: 2020-09-18

## 2020-09-18 LAB
ANION GAP SERPL CALC-SCNC: 14 MMO/L — SIGNIFICANT CHANGE UP (ref 7–14)
ANION GAP SERPL CALC-SCNC: 15 MMO/L — HIGH (ref 7–14)
ANION GAP SERPL CALC-SCNC: 15 MMO/L — HIGH (ref 7–14)
ANION GAP SERPL CALC-SCNC: 16 MMO/L — HIGH (ref 7–14)
BASOPHILS # BLD AUTO: 0.04 K/UL — SIGNIFICANT CHANGE UP (ref 0–0.2)
BASOPHILS NFR BLD AUTO: 0.4 % — SIGNIFICANT CHANGE UP (ref 0–2)
BUN SERPL-MCNC: 12 MG/DL — SIGNIFICANT CHANGE UP (ref 7–23)
BUN SERPL-MCNC: 13 MG/DL — SIGNIFICANT CHANGE UP (ref 7–23)
CALCIUM SERPL-MCNC: 8.5 MG/DL — SIGNIFICANT CHANGE UP (ref 8.4–10.5)
CALCIUM SERPL-MCNC: 8.5 MG/DL — SIGNIFICANT CHANGE UP (ref 8.4–10.5)
CALCIUM SERPL-MCNC: 8.6 MG/DL — SIGNIFICANT CHANGE UP (ref 8.4–10.5)
CALCIUM SERPL-MCNC: 8.7 MG/DL — SIGNIFICANT CHANGE UP (ref 8.4–10.5)
CHLORIDE SERPL-SCNC: 94 MMOL/L — LOW (ref 98–107)
CHLORIDE SERPL-SCNC: 95 MMOL/L — LOW (ref 98–107)
CHLORIDE SERPL-SCNC: 95 MMOL/L — LOW (ref 98–107)
CHLORIDE SERPL-SCNC: 97 MMOL/L — LOW (ref 98–107)
CO2 SERPL-SCNC: 22 MMOL/L — SIGNIFICANT CHANGE UP (ref 22–31)
CO2 SERPL-SCNC: 23 MMOL/L — SIGNIFICANT CHANGE UP (ref 22–31)
CO2 SERPL-SCNC: 24 MMOL/L — SIGNIFICANT CHANGE UP (ref 22–31)
CO2 SERPL-SCNC: 24 MMOL/L — SIGNIFICANT CHANGE UP (ref 22–31)
CREAT SERPL-MCNC: 1.07 MG/DL — SIGNIFICANT CHANGE UP (ref 0.5–1.3)
CREAT SERPL-MCNC: 1.07 MG/DL — SIGNIFICANT CHANGE UP (ref 0.5–1.3)
CREAT SERPL-MCNC: 1.08 MG/DL — SIGNIFICANT CHANGE UP (ref 0.5–1.3)
CREAT SERPL-MCNC: 1.09 MG/DL — SIGNIFICANT CHANGE UP (ref 0.5–1.3)
CULTURE RESULTS: SIGNIFICANT CHANGE UP
EOSINOPHIL # BLD AUTO: 0.12 K/UL — SIGNIFICANT CHANGE UP (ref 0–0.5)
EOSINOPHIL NFR BLD AUTO: 1.3 % — SIGNIFICANT CHANGE UP (ref 0–6)
GLUCOSE BLDC GLUCOMTR-MCNC: 210 MG/DL — HIGH (ref 70–99)
GLUCOSE BLDC GLUCOMTR-MCNC: 226 MG/DL — HIGH (ref 70–99)
GLUCOSE BLDC GLUCOMTR-MCNC: 252 MG/DL — HIGH (ref 70–99)
GLUCOSE BLDC GLUCOMTR-MCNC: 263 MG/DL — HIGH (ref 70–99)
GLUCOSE SERPL-MCNC: 202 MG/DL — HIGH (ref 70–99)
GLUCOSE SERPL-MCNC: 242 MG/DL — HIGH (ref 70–99)
GLUCOSE SERPL-MCNC: 252 MG/DL — HIGH (ref 70–99)
GLUCOSE SERPL-MCNC: 351 MG/DL — HIGH (ref 70–99)
HBA1C BLD-MCNC: 8.9 % — HIGH (ref 4–5.6)
HCT VFR BLD CALC: 34.9 % — SIGNIFICANT CHANGE UP (ref 34.5–45)
HGB BLD-MCNC: 11.5 G/DL — SIGNIFICANT CHANGE UP (ref 11.5–15.5)
IMM GRANULOCYTES NFR BLD AUTO: 0.4 % — SIGNIFICANT CHANGE UP (ref 0–1.5)
LYMPHOCYTES # BLD AUTO: 3.96 K/UL — HIGH (ref 1–3.3)
LYMPHOCYTES # BLD AUTO: 42.2 % — SIGNIFICANT CHANGE UP (ref 13–44)
MAGNESIUM SERPL-MCNC: 1.6 MG/DL — SIGNIFICANT CHANGE UP (ref 1.6–2.6)
MAGNESIUM SERPL-MCNC: 1.7 MG/DL — SIGNIFICANT CHANGE UP (ref 1.6–2.6)
MAGNESIUM SERPL-MCNC: 1.7 MG/DL — SIGNIFICANT CHANGE UP (ref 1.6–2.6)
MCHC RBC-ENTMCNC: 28 PG — SIGNIFICANT CHANGE UP (ref 27–34)
MCHC RBC-ENTMCNC: 33 % — SIGNIFICANT CHANGE UP (ref 32–36)
MCV RBC AUTO: 85.1 FL — SIGNIFICANT CHANGE UP (ref 80–100)
MONOCYTES # BLD AUTO: 0.53 K/UL — SIGNIFICANT CHANGE UP (ref 0–0.9)
MONOCYTES NFR BLD AUTO: 5.6 % — SIGNIFICANT CHANGE UP (ref 2–14)
NEUTROPHILS # BLD AUTO: 4.7 K/UL — SIGNIFICANT CHANGE UP (ref 1.8–7.4)
NEUTROPHILS NFR BLD AUTO: 50.1 % — SIGNIFICANT CHANGE UP (ref 43–77)
NRBC # FLD: 0 K/UL — SIGNIFICANT CHANGE UP (ref 0–0)
OSMOLALITY SERPL: 287 MOSMO/KG — SIGNIFICANT CHANGE UP (ref 275–295)
OSMOLALITY UR: 238 MOSMO/KG — SIGNIFICANT CHANGE UP (ref 50–1200)
PHOSPHATE SERPL-MCNC: 2.4 MG/DL — LOW (ref 2.5–4.5)
PHOSPHATE SERPL-MCNC: 2.4 MG/DL — LOW (ref 2.5–4.5)
PHOSPHATE SERPL-MCNC: 2.5 MG/DL — SIGNIFICANT CHANGE UP (ref 2.5–4.5)
PLATELET # BLD AUTO: 243 K/UL — SIGNIFICANT CHANGE UP (ref 150–400)
PMV BLD: 11.8 FL — SIGNIFICANT CHANGE UP (ref 7–13)
POTASSIUM SERPL-MCNC: 3.9 MMOL/L — SIGNIFICANT CHANGE UP (ref 3.5–5.3)
POTASSIUM SERPL-MCNC: 3.9 MMOL/L — SIGNIFICANT CHANGE UP (ref 3.5–5.3)
POTASSIUM SERPL-MCNC: 4 MMOL/L — SIGNIFICANT CHANGE UP (ref 3.5–5.3)
POTASSIUM SERPL-MCNC: 4.2 MMOL/L — SIGNIFICANT CHANGE UP (ref 3.5–5.3)
POTASSIUM SERPL-SCNC: 3.9 MMOL/L — SIGNIFICANT CHANGE UP (ref 3.5–5.3)
POTASSIUM SERPL-SCNC: 3.9 MMOL/L — SIGNIFICANT CHANGE UP (ref 3.5–5.3)
POTASSIUM SERPL-SCNC: 4 MMOL/L — SIGNIFICANT CHANGE UP (ref 3.5–5.3)
POTASSIUM SERPL-SCNC: 4.2 MMOL/L — SIGNIFICANT CHANGE UP (ref 3.5–5.3)
RBC # BLD: 4.1 M/UL — SIGNIFICANT CHANGE UP (ref 3.8–5.2)
RBC # FLD: 13 % — SIGNIFICANT CHANGE UP (ref 10.3–14.5)
SODIUM SERPL-SCNC: 132 MMOL/L — LOW (ref 135–145)
SODIUM SERPL-SCNC: 132 MMOL/L — LOW (ref 135–145)
SODIUM SERPL-SCNC: 135 MMOL/L — SIGNIFICANT CHANGE UP (ref 135–145)
SODIUM SERPL-SCNC: 135 MMOL/L — SIGNIFICANT CHANGE UP (ref 135–145)
SODIUM UR-SCNC: 35 MMOL/L — SIGNIFICANT CHANGE UP
SPECIMEN SOURCE: SIGNIFICANT CHANGE UP
WBC # BLD: 9.39 K/UL — SIGNIFICANT CHANGE UP (ref 3.8–10.5)
WBC # FLD AUTO: 9.39 K/UL — SIGNIFICANT CHANGE UP (ref 3.8–10.5)

## 2020-09-18 PROCEDURE — 99233 SBSQ HOSP IP/OBS HIGH 50: CPT | Mod: GC

## 2020-09-18 RX ORDER — SODIUM CHLORIDE 9 MG/ML
1000 INJECTION, SOLUTION INTRAVENOUS
Refills: 0 | Status: DISCONTINUED | OUTPATIENT
Start: 2020-09-18 | End: 2020-09-19

## 2020-09-18 RX ORDER — SODIUM CHLORIDE 9 MG/ML
1000 INJECTION, SOLUTION INTRAVENOUS
Refills: 0 | Status: DISCONTINUED | OUTPATIENT
Start: 2020-09-18 | End: 2020-09-18

## 2020-09-18 RX ADMIN — Medication 3: at 12:13

## 2020-09-18 RX ADMIN — HEPARIN SODIUM 5000 UNIT(S): 5000 INJECTION INTRAVENOUS; SUBCUTANEOUS at 21:28

## 2020-09-18 RX ADMIN — HEPARIN SODIUM 5000 UNIT(S): 5000 INJECTION INTRAVENOUS; SUBCUTANEOUS at 05:47

## 2020-09-18 RX ADMIN — SODIUM CHLORIDE 75 MILLILITER(S): 9 INJECTION, SOLUTION INTRAVENOUS at 12:14

## 2020-09-18 RX ADMIN — Medication 150 MICROGRAM(S): at 05:47

## 2020-09-18 RX ADMIN — Medication 2: at 08:51

## 2020-09-18 RX ADMIN — HEPARIN SODIUM 5000 UNIT(S): 5000 INJECTION INTRAVENOUS; SUBCUTANEOUS at 14:35

## 2020-09-18 RX ADMIN — SODIUM CHLORIDE 100 MILLILITER(S): 9 INJECTION, SOLUTION INTRAVENOUS at 14:34

## 2020-09-18 RX ADMIN — LOSARTAN POTASSIUM 100 MILLIGRAM(S): 100 TABLET, FILM COATED ORAL at 05:48

## 2020-09-18 RX ADMIN — Medication 3: at 17:43

## 2020-09-18 NOTE — DISCHARGE NOTE PROVIDER - CARE PROVIDER_API CALL
Aurelia Martínez  INTERNAL MEDICINE  [address] [place] [address], [place] [address]  Phone: [phone]  Fax: [phone]  Established Patient  Follow Up Time: 2 weeks

## 2020-09-18 NOTE — DISCHARGE NOTE PROVIDER - NSDCMRMEDTOKEN_GEN_ALL_CORE_FT
Haileaglar KenyonPen 100 units/mL subcutaneous solution: 18 unit(s) subcutaneous once a day (at bedtime)  DilTIAZem Hydrochloride  mg/24 hours oral capsule, extended release: 1 cap(s) orally once a day  glipiZIDE 10 mg oral tablet: 1 tab(s) orally 2 times a day (before meals)  hydroCHLOROthiazide 25 mg oral tablet: 1 tab(s) orally once a day  insulin: 18 unit(s) injectable once a day  Janumet: 1 tab(s) orally 2 times a day  levothyroxine 150 mcg (0.15 mg) oral tablet: 1 tab(s) orally once a day for 5 days a week (Mon-Fri)  levothyroxine 175 mcg (0.175 mg) oral tablet: 1 tab(s) orally once a day for 2 days a week (Sat-Sun)  losartan 100 mg oral tablet: 1 tab(s) orally once a day  metFORMIN 500 mg oral tablet: 1 tab(s) orally 2 times a day   Basaglar KenyonPen 100 units/mL subcutaneous solution: 18 unit(s) subcutaneous once a day (at bedtime)  DilTIAZem Hydrochloride  mg/24 hours oral capsule, extended release: 1 cap(s) orally once a day  glipiZIDE 10 mg oral tablet: 1 tab(s) orally 2 times a day (before meals)  insulin: 18 unit(s) injectable once a day  Janumet: 1 tab(s) orally 2 times a day  levothyroxine 150 mcg (0.15 mg) oral tablet: 1 tab(s) orally once a day for 5 days a week (Mon-Fri)  levothyroxine 175 mcg (0.175 mg) oral tablet: 1 tab(s) orally once a day for 2 days a week (Sat-Sun)  losartan 100 mg oral tablet: 1 tab(s) orally once a day  metFORMIN 500 mg oral tablet: 1 tab(s) orally 2 times a day   Haileaglhector Mina 100 units/mL subcutaneous solution: 18 unit(s) subcutaneous once a day (at bedtime)  DilTIAZem Hydrochloride  mg/24 hours oral capsule, extended release: 1 cap(s) orally once a day  glipiZIDE 10 mg oral tablet: 1 tab(s) orally 2 times a day (before meals)  Janumet: 1 tab(s) orally 2 times a day  levothyroxine 150 mcg (0.15 mg) oral tablet: 1 tab(s) orally once a day for 5 days a week (Mon-Fri)  levothyroxine 175 mcg (0.175 mg) oral tablet: 1 tab(s) orally once a day for 2 days a week (Sat-Sun)  losartan 100 mg oral tablet: 1 tab(s) orally once a day   Haileaglar KenyonPen 100 units/mL subcutaneous solution: 18 unit(s) subcutaneous once a day (at bedtime)  DilTIAZem Hydrochloride  mg/24 hours oral capsule, extended release: 1 cap(s) orally once a day  glipiZIDE 10 mg oral tablet: 1 tab(s) orally 2 times a day (before meals)  Janumet: 1 tab(s) orally 2 times a day  levothyroxine 150 mcg (0.15 mg) oral tablet: 1 tab(s) orally once a day for 5 days a week (Mon-Fri)  levothyroxine 175 mcg (0.175 mg) oral tablet: 1 tab(s) orally once a day for 2 days a week (Sat-Sun)  losartan 100 mg oral tablet: 1 tab(s) orally once a day  Norvasc 5 mg oral tablet: 1 tab(s) orally once

## 2020-09-18 NOTE — CONSULT NOTE ADULT - ATTENDING COMMENTS
Pt. with hyponatremia, that has rapidly overcorrected. Pt. at risk of ODS, in the setting of rapid correction of sodium. Rate of D5w increased at 1 pm today. Monitor sodium level q4 hourly, continue D5w. Consider ddAVP if repeat sodium remains 135 or higher.

## 2020-09-18 NOTE — CONSULT NOTE ADULT - PROBLEM SELECTOR RECOMMENDATION 9
Na level to be 119 on 9/17/20  at 3 am, then 133 at 9 pm on 9/17/20, and then 135 at 4 30 am this morning and then 135 at 10 am today. Urine Osmolality 238, urine Na 35. S/p 3 boluses of IV fluids. Hypotonic hyponatremia 2/2 to HCTz use. Over correcting. Increase D5W to 75 cc/hr. Monitor BMP q4h. Na should not correct more that 6 meq in first 24 hour (125 meq) and should not increase by more than 16 meq in 48 hours (135 meq).    Case discussed with attending    Leonardo Zuniga   Nephrology Fellow  [facility] Pager: [phone]  [facility] Pager: [phone] Na level to be 119 on 9/17/20  at 3 am, then 133 at 9 pm on 9/17/20, and then 135 at 4 30 am this morning and then 135 at 10 am today. Urine Osmolality 238, urine Na 35. S/p 3 boluses of IV fluids. Hypotonic hyponatremia 2/2 to HCTz use. Over correcting. Increase D5W to 100 cc/hr. Monitor BMP q4h. Na should not correct more that 6 meq in first 24 hour (125 meq) and should not increase by more than 16 meq in 48 hours (135 meq).    Case discussed with attending    Leonardo Zuniga   Nephrology Fellow  [facility] Pager: [phone]  [facility] Pager: [phone] Na level to be 119 on 9/17/20  at 3 am, then 133 at 9 pm on 9/17/20, and then 135 at 4 30 am this morning and then 135 at 10 am today. Urine Osmolality 238, urine Na 35. S/p 3 boluses of IV fluids. Hypotonic hyponatremia 2/2 to HCTz use. Over correcting. Increase D5W to 100 cc/hr. Monitor BMP q4h. Na should not correct more that 6 meq in first 24 hour (125 meq) and should not increase by more than 16 meq in 48 hours (135 meq). Please also monitor urine output.    Case discussed with attending    Leonardo Zuniga   Nephrology Fellow  [facility] Pager: [phone]  [facility] Pager: [phone] Na level to be 119 on 9/17/20  at 3 am, then 133 at 9 pm on 9/17/20, and then 135 at 4 30 am this morning and then 135 at 10 am today. Urine Osmolality 238, urine Na 35. S/p 3 boluses of IV fluids. Hypotonic hyponatremia 2/2 to HCTz use. Over correcting. Increase D5W to 100 cc/hr. Monitor BMP q4h. Na should not correct more that 6 meq in first 24 hour (125 meq) and should not increase by more than 12-16 meq in 48 hours (135 meq). Please also monitor urine output.    Case discussed with attending    Leonardo Zuniga   Nephrology Fellow  [facility] Pager: [phone]  [facility] Pager: [phone]

## 2020-09-18 NOTE — PROGRESS NOTE ADULT - SUBJECTIVE AND OBJECTIVE BOX
PROGRESS NOTE:   Authored by Clifford Rodgers MD  PGY-1, Internal Medicine  Pager [facility] [phone], [phone]     Patient is a 50y old  Female who presents with a chief complaint of Hyponatremia (18 Sep 2020 12:08)      SUBJECTIVE / OVERNIGHT EVENTS: No events overnight. Pain is improved although pt still feeling headache and pain in her left flank. Pt says when she urinates, she still feels a pain on her left side. Patient also noted a small palpable mass behind her neck that she says can be painful sometimes.     ADDITIONAL REVIEW OF SYSTEMS: Denies fevers, chills, n/v.    MEDICATIONS  (STANDING):  dextrose 5%. 1000 milliLiter(s) (50 mL/Hr) IV Continuous <Continuous>  dextrose 5%. 1000 milliLiter(s) (100 mL/Hr) IV Continuous <Continuous>  dextrose 50% Injectable 12.5 Gram(s) IV Push once  dextrose 50% Injectable 25 Gram(s) IV Push once  dextrose 50% Injectable 25 Gram(s) IV Push once  diltiazem    milliGRAM(s) Oral daily  heparin   Injectable 5000 Unit(s) SubCutaneous every 8 hours  influenza   Vaccine 0.5 milliLiter(s) IntraMuscular once  insulin lispro (HumaLOG) corrective regimen sliding scale   SubCutaneous three times a day before meals  insulin lispro (HumaLOG) corrective regimen sliding scale   SubCutaneous at bedtime  levothyroxine 150 MICROGram(s) Oral <User Schedule>  levothyroxine 175 MICROGram(s) Oral <User Schedule>  losartan 100 milliGRAM(s) Oral daily    MEDICATIONS  (PRN):  dextrose 40% Gel 15 Gram(s) Oral once PRN Blood Glucose LESS THAN 70 milliGRAM(s)/deciliter  glucagon  Injectable 1 milliGRAM(s) IntraMuscular once PRN Glucose LESS THAN 70 milligrams/deciliter      CAPILLARY BLOOD GLUCOSE      POCT Blood Glucose.: 263 mg/dL (18 Sep 2020 12:04)  POCT Blood Glucose.: 210 mg/dL (18 Sep 2020 08:28)  POCT Blood Glucose.: 157 mg/dL (17 Sep 2020 23:18)    I&O's Summary      PHYSICAL EXAM:  Vital Signs Last 24 Hrs  T(C): 36.4 (18 Sep 2020 05:14), Max: 36.8 (17 Sep 2020 14:50)  T(F): 97.6 (18 Sep 2020 05:14), Max: 98.2 (17 Sep 2020 14:50)  HR: 75 (18 Sep 2020 05:14) (75 - 97)  BP: 123/76 (18 Sep 2020 05:14) (123/76 - 150/64)  BP(mean): --  RR: 16 (18 Sep 2020 05:14) (16 - 20)  SpO2: 99% (18 Sep 2020 05:14) (99% - 100%)    CONSTITUTIONAL: NAD, lying in bed comfortably  RESPIRATORY: Normal respiratory effort; CTABL  CARDIOVASCULAR: Regular rate and rhythm, normal S1 and S2, no murmur/rub/gallop  ABDOMEN: Soft, nondistended, nontender to palpation, normoactive bowel sounds, no rebound/guarding  MUSCLOSKELETAL: no joint swelling or tenderness to palpation, FROM all extremities  NEURO: AAOx3 to person, place, and time, full and equal strength all extremities   EXTREMITIES: no pedal edema    LABS:                        11.5   9.39  )-----------( 243      ( 18 Sep 2020 04:30 )             34.9     09-18    135  |  95<L>  |  13  ----------------------------<  351<H>  4.2   |  24  |  1.07    Ca    8.6      18 Sep 2020 10:25  Phos  2.4     -18  Mg     1.7     -18    TPro  8.0  /  Alb  4.8  /  TBili  0.4  /  DBili  x   /  AST  16  /  ALT  15  /  AlkPhos  88  09-17          Urinalysis Basic - ( 17 Sep 2020 03:00 )    Color: COLORLESS / Appearance: CLEAR / S.005 / pH: 6.5  Gluc: 300 / Ketone: TRACE  / Bili: NEGATIVE / Urobili: NORMAL   Blood: NEGATIVE / Protein: NEGATIVE / Nitrite: NEGATIVE   Leuk Esterase: NEGATIVE / RBC: x / WBC x   Sq Epi: x / Non Sq Epi: x / Bacteria: x        Culture - Urine (collected 17 Sep 2020 03:51)  Source: .Urine Clean Catch (Midstream)  Final Report (18 Sep 2020 06:54):    <10,000 CFU/mL Normal Urogenital Delfina        RADIOLOGY & ADDITIONAL TESTS:

## 2020-09-18 NOTE — DISCHARGE NOTE PROVIDER - NSFOLLOWUPCLINICS_GEN_ALL_ED_FT
[facility] Endocrinology  Endocrinology  [address] [place] [address]  Phone: [phone]  Fax:   Follow Up Time: Routine       [facility] Endocrinology  Endocrinology  [address] [place] [address]  Phone: [phone]  Fax:   Follow Up Time: Routine    [facility] - Primary Care  Primary Care  [address] [place] [address]  Phone: [phone]  Fax:   Follow Up Time: Routine

## 2020-09-18 NOTE — PROGRESS NOTE ADULT - PROBLEM SELECTOR PLAN 1
Na 119 on admission, s/p 2L LR and 1L NS, urine sodium 41 and osmolality 144.  - Likely due to HCTZ use, but primary polydipsia may be contributory as well.  - Held HCTZ  - Urine Na 35 and osmolality 238  - Overcorrected Na this AM, now Na 135. Started D5 at 100ml/hr  - F/u BMP @14:30 (q4h)

## 2020-09-18 NOTE — CONSULT NOTE ADULT - SUBJECTIVE AND OBJECTIVE BOX
[facility] DIVISION OF KIDNEY DISEASES AND HYPERTENSION -- [phone]  -- INITIAL CONSULT NOTE  --------------------------------------------------------------------------------  HPI: Pt is a 49 y/o F w PMH of HTN, DM, previous thyroid carcinoma s/p thyroidectomy 2008 presented to [facility] c/o of dysuria. Pt reported that she was taking nitrofuratoin for 10 days but still felt dysuria and was not feeling better. So then she started taking cipro fo about 3 days and reported that it was not improving her symptoms which brought her to the hospital. In ED, noted Na level to be         PAST HISTORY  --------------------------------------------------------------------------------  PAST MEDICAL & SURGICAL HISTORY:  Post-operative hypothyroidism    Hypertension, unspecified type    Diabetes    History of salpingoophorectomy    History of cholecystectomy    H/O thyroidectomy      FAMILY HISTORY:  Family history of diabetes mellitus  Father and mother    Family hx of hypertension  father and mother      PAST SOCIAL HISTORY:    ALLERGIES & MEDICATIONS  --------------------------------------------------------------------------------  Allergies    Allergy Status Unknown    Intolerances      Standing Inpatient Medications  dextrose 5%. 1000 milliLiter(s) IV Continuous <Continuous>  dextrose 5%. 1000 milliLiter(s) IV Continuous <Continuous>  dextrose 50% Injectable 12.5 Gram(s) IV Push once  dextrose 50% Injectable 25 Gram(s) IV Push once  dextrose 50% Injectable 25 Gram(s) IV Push once  diltiazem    milliGRAM(s) Oral daily  heparin   Injectable 5000 Unit(s) SubCutaneous every 8 hours  influenza   Vaccine 0.5 milliLiter(s) IntraMuscular once  insulin lispro (HumaLOG) corrective regimen sliding scale   SubCutaneous three times a day before meals  insulin lispro (HumaLOG) corrective regimen sliding scale   SubCutaneous at bedtime  levothyroxine 150 MICROGram(s) Oral <User Schedule>  levothyroxine 175 MICROGram(s) Oral <User Schedule>  losartan 100 milliGRAM(s) Oral daily    PRN Inpatient Medications  dextrose 40% Gel 15 Gram(s) Oral once PRN  glucagon  Injectable 1 milliGRAM(s) IntraMuscular once PRN      REVIEW OF SYSTEMS  --------------------------------------------------------------------------------  Gen: No fevers/chills  Skin: No rashes  Head/Eyes/Ears: Normal hearing,   Respiratory: No dyspnea, cough  CV: No chest pain  GI: No abdominal pain, diarrhea  : No dysuria, hematuria  MSK: No  edema  Heme: No easy bruising or bleeding  Psych: No significant depression    All other systems were reviewed and are negative, except as noted.    VITALS/PHYSICAL EXAM  --------------------------------------------------------------------------------  T(C): 36.4 (09-18-20 @ 05:14), Max: 36.8 (09-17-20 @ 14:50)  HR: 75 (09-18-20 @ 05:14) (75 - 97)  BP: 123/76 (09-18-20 @ 05:14) (123/76 - 150/64)  RR: 16 (09-18-20 @ 05:14) (16 - 20)  SpO2: 99% (09-18-20 @ 05:14) (99% - 100%)  Wt(kg): --  Height (cm): 165.1 (09-17-20 @ 17:39)  Weight (kg): 91.2 (09-17-20 @ 17:39)  BMI (kg/m2): 33.5 (09-17-20 @ 17:39)  BSA (m2): 1.98 (09-17-20 @ 17:39)      Physical Exam:  	Gen: NAD  	HEENT: MMM  	Pulm: CTA B/L  	CV: S1S2  	Abd: Soft, +BS   	Ext: No LE edema B/L  	Neuro: Awake  	Skin: Warm and dry  	Vascular access:    LABS/STUDIES  --------------------------------------------------------------------------------              11.5   9.39  >-----------<  243      [09-18-20 @ 04:30]              34.9     135  |  95  |  13  ----------------------------<  351      [09-18-20 @ 10:25]  4.2   |  24  |  1.07        Ca     8.6     [09-18-20 @ 10:25]      Mg     1.7     [09-18-20 @ 10:25]      Phos  2.4     [09-18-20 @ 10:25]    TPro  8.0  /  Alb  4.8  /  TBili  0.4  /  DBili  x   /  AST  16  /  ALT  15  /  AlkPhos  88  [09-17-20 @ 03:00]        Uric acid 5.5      [09-17-20 @ 04:50]  Serum Osmolality 287      [09-18-20 @ 04:30]    Creatinine Trend:  SCr 1.07 [09-18 @ 10:25]  SCr 1.09 [09-18 @ 04:30]  SCr 1.22 [09-17 @ 21:25]  SCr 1.03 [09-17 @ 04:50]  SCr 1.18 [09-17 @ 03:00]    Urinalysis - [09-17-20 @ 03:00]      Color COLORLESS / Appearance CLEAR / SG 1.005 / pH 6.5      Gluc 300 / Ketone TRACE  / Bili NEGATIVE / Urobili NORMAL       Blood NEGATIVE / Protein NEGATIVE / Leuk Est NEGATIVE / Nitrite NEGATIVE      RBC  / WBC  / Hyaline  / Gran  / Sq Epi  / Non Sq Epi  / Bacteria     Urine Creatinine 22.80      [09-17-20 @ 06:38]  Urine Sodium 35      [09-18-20 @ 05:54]  Urine Osmolality 238      [09-18-20 @ 05:54]    TSH 0.61      [09-17-20 @ 04:50]       [facility] DIVISION OF KIDNEY DISEASES AND HYPERTENSION -- [phone]  -- INITIAL CONSULT NOTE  --------------------------------------------------------------------------------  HPI: Pt is a 49 y/o F w PMH of HTN, DM, previous thyroid carcinoma s/p thyroidectomy 2008 presented to [facility] c/o of dysuria. Pt reported that she was taking nitrofuratoin for 10 days but still felt dysuria and was not feeling better. So then she started taking cipro fo about 3 days and reported that it was not improving her symptoms which brought her to the hospital. In ED, noted Na level to be 119 on 9/17/20  at 3 am, then 133 at 9 pm on 9/17/20, and then 135 at 4 30 am this morning and then 135 at 10 am today. Pt reported that she was taking HCTz 25 mg PO daily for many years. Also reported feeling nauseas w decrease appetite for a few days. Nephrology consulted for hyponatremia.     Pt was seen and examined at bedside. She was NAD. Able to speak in full sentences. Denies CP, SOB, dysuria, fever, chills,  LE edema.         PAST HISTORY  --------------------------------------------------------------------------------  PAST MEDICAL & SURGICAL HISTORY:  Post-operative hypothyroidism    Hypertension, unspecified type    Diabetes    History of salpingoophorectomy    History of cholecystectomy    H/O thyroidectomy      FAMILY HISTORY:  Family history of diabetes mellitus  Father and mother    Family hx of hypertension  father and mother      PAST SOCIAL HISTORY: is an ultrasound tech     ALLERGIES & MEDICATIONS  --------------------------------------------------------------------------------  Allergies    Allergy Status Unknown    Intolerances      Standing Inpatient Medications  dextrose 5%. 1000 milliLiter(s) IV Continuous <Continuous>  dextrose 5%. 1000 milliLiter(s) IV Continuous <Continuous>  dextrose 50% Injectable 12.5 Gram(s) IV Push once  dextrose 50% Injectable 25 Gram(s) IV Push once  dextrose 50% Injectable 25 Gram(s) IV Push once  diltiazem    milliGRAM(s) Oral daily  heparin   Injectable 5000 Unit(s) SubCutaneous every 8 hours  influenza   Vaccine 0.5 milliLiter(s) IntraMuscular once  insulin lispro (HumaLOG) corrective regimen sliding scale   SubCutaneous three times a day before meals  insulin lispro (HumaLOG) corrective regimen sliding scale   SubCutaneous at bedtime  levothyroxine 150 MICROGram(s) Oral <User Schedule>  levothyroxine 175 MICROGram(s) Oral <User Schedule>  losartan 100 milliGRAM(s) Oral daily    PRN Inpatient Medications  dextrose 40% Gel 15 Gram(s) Oral once PRN  glucagon  Injectable 1 milliGRAM(s) IntraMuscular once PRN      REVIEW OF SYSTEMS  --------------------------------------------------------------------------------  Gen: No fevers/chills  Skin: No rashes  Head/Eyes/Ears: Normal hearing,   Respiratory: No dyspnea, cough  CV: No chest pain  GI: No abdominal pain, diarrhea  : No dysuria, hematuria  MSK: No  edema  Heme: No easy bruising or bleeding  Psych: No significant depression    All other systems were reviewed and are negative, except as noted.    VITALS/PHYSICAL EXAM  --------------------------------------------------------------------------------  T(C): 36.4 (09-18-20 @ 05:14), Max: 36.8 (09-17-20 @ 14:50)  HR: 75 (09-18-20 @ 05:14) (75 - 97)  BP: 123/76 (09-18-20 @ 05:14) (123/76 - 150/64)  RR: 16 (09-18-20 @ 05:14) (16 - 20)  SpO2: 99% (09-18-20 @ 05:14) (99% - 100%)  Wt(kg): --  Height (cm): 165.1 (09-17-20 @ 17:39)  Weight (kg): 91.2 (09-17-20 @ 17:39)  BMI (kg/m2): 33.5 (09-17-20 @ 17:39)  BSA (m2): 1.98 (09-17-20 @ 17:39)      Physical Exam:  	Gen: NAD  	HEENT: MMM  	Pulm: CTA B/L, no crackles or wheezing   	CV: S1S2+, RRR  	Abd: Soft, +BS   	Ext: No LE edema B/L  	Neuro: Awake  	Skin: Warm and dry  	Vascular access: IV lines    LABS/STUDIES  --------------------------------------------------------------------------------              11.5   9.39  >-----------<  243      [09-18-20 @ 04:30]              34.9     135  |  95  |  13  ----------------------------<  351      [09-18-20 @ 10:25]  4.2   |  24  |  1.07        Ca     8.6     [09-18-20 @ 10:25]      Mg     1.7     [09-18-20 @ 10:25]      Phos  2.4     [09-18-20 @ 10:25]    TPro  8.0  /  Alb  4.8  /  TBili  0.4  /  DBili  x   /  AST  16  /  ALT  15  /  AlkPhos  88  [09-17-20 @ 03:00]        Uric acid 5.5      [09-17-20 @ 04:50]  Serum Osmolality 287      [09-18-20 @ 04:30]    Creatinine Trend:  SCr 1.07 [09-18 @ 10:25]  SCr 1.09 [09-18 @ 04:30]  SCr 1.22 [09-17 @ 21:25]  SCr 1.03 [09-17 @ 04:50]  SCr 1.18 [09-17 @ 03:00]    Urinalysis - [09-17-20 @ 03:00]      Color COLORLESS / Appearance CLEAR / SG 1.005 / pH 6.5      Gluc 300 / Ketone TRACE  / Bili NEGATIVE / Urobili NORMAL       Blood NEGATIVE / Protein NEGATIVE / Leuk Est NEGATIVE / Nitrite NEGATIVE      RBC  / WBC  / Hyaline  / Gran  / Sq Epi  / Non Sq Epi  / Bacteria     Urine Creatinine 22.80      [09-17-20 @ 06:38]  Urine Sodium 35      [09-18-20 @ 05:54]  Urine Osmolality 238      [09-18-20 @ 05:54]    TSH 0.61      [09-17-20 @ 04:50]       [facility] DIVISION OF KIDNEY DISEASES AND HYPERTENSION -- [phone]  -- INITIAL CONSULT NOTE  --------------------------------------------------------------------------------  HPI: Pt is a 51 y/o F w PMH of HTN, DM, previous thyroid carcinoma s/p thyroidectomy 2008 presented to [facility] c/o of dysuria. Pt reported that she was taking nitrofuratoin for 10 days but still felt dysuria and was not feeling better. So then she started taking cipro fo about 3 days and reported that it was not improving her symptoms which brought her to the hospital. In ED, noted Na level to be 119 on 9/17/20  at 3 am, then 133 at 9 pm on 9/17/20, and then 135 at 4 30 am this morning and then 135 at 10 am today. Pt reported that she was taking HCTz 25 mg PO daily for many years. Also reported feeling nauseas w decrease appetite for a few days. Nephrology consulted for hyponatremia.     Pt was seen and examined at bedside. She was NAD. Able to speak in full sentences. Denies CP, SOB, dysuria, fever, chills,  LE edema.         PAST HISTORY  --------------------------------------------------------------------------------  PAST MEDICAL & SURGICAL HISTORY:  Post-operative hypothyroidism    Hypertension, unspecified type    Diabetes    History of salpingoophorectomy    History of cholecystectomy    H/O thyroidectomy      FAMILY HISTORY:  Family history of diabetes mellitus  Father and mother    Family hx of hypertension  father and mother      PAST SOCIAL HISTORY: is an ultrasound tech     ALLERGIES & MEDICATIONS  --------------------------------------------------------------------------------  Allergies    Allergy Status Unknown    Intolerances      Standing Inpatient Medications  dextrose 5%. 1000 milliLiter(s) IV Continuous <Continuous>  dextrose 5%. 1000 milliLiter(s) IV Continuous <Continuous>  dextrose 50% Injectable 12.5 Gram(s) IV Push once  dextrose 50% Injectable 25 Gram(s) IV Push once  dextrose 50% Injectable 25 Gram(s) IV Push once  diltiazem    milliGRAM(s) Oral daily  heparin   Injectable 5000 Unit(s) SubCutaneous every 8 hours  influenza   Vaccine 0.5 milliLiter(s) IntraMuscular once  insulin lispro (HumaLOG) corrective regimen sliding scale   SubCutaneous three times a day before meals  insulin lispro (HumaLOG) corrective regimen sliding scale   SubCutaneous at bedtime  levothyroxine 150 MICROGram(s) Oral <User Schedule>  levothyroxine 175 MICROGram(s) Oral <User Schedule>  losartan 100 milliGRAM(s) Oral daily    PRN Inpatient Medications  dextrose 40% Gel 15 Gram(s) Oral once PRN  glucagon  Injectable 1 milliGRAM(s) IntraMuscular once PRN      REVIEW OF SYSTEMS  --------------------------------------------------------------------------------  Respiratory: No dyspnea, cough  CV: No chest pain  GI: No abdominal pain, diarrhea  : No dysuria, hematuria  MSK: No  edema    All other systems were reviewed and are negative, except as noted.    VITALS/PHYSICAL EXAM  --------------------------------------------------------------------------------  T(C): 36.4 (09-18-20 @ 05:14), Max: 36.8 (09-17-20 @ 14:50)  HR: 75 (09-18-20 @ 05:14) (75 - 97)  BP: 123/76 (09-18-20 @ 05:14) (123/76 - 150/64)  RR: 16 (09-18-20 @ 05:14) (16 - 20)  SpO2: 99% (09-18-20 @ 05:14) (99% - 100%)  Wt(kg): --  Height (cm): 165.1 (09-17-20 @ 17:39)  Weight (kg): 91.2 (09-17-20 @ 17:39)  BMI (kg/m2): 33.5 (09-17-20 @ 17:39)  BSA (m2): 1.98 (09-17-20 @ 17:39)      Physical Exam:  	Gen: NAD  	HEENT: MMM  	Pulm: CTA B/L, no crackles or wheezing   	CV: S1S2+, RRR  	Abd: Soft, +BS   	Ext: No LE edema B/L  	Neuro: Awake  	Skin: Warm and dry  	Vascular access: IV lines    LABS/STUDIES  --------------------------------------------------------------------------------              11.5   9.39  >-----------<  243      [09-18-20 @ 04:30]              34.9     135  |  95  |  13  ----------------------------<  351      [09-18-20 @ 10:25]  4.2   |  24  |  1.07        Ca     8.6     [09-18-20 @ 10:25]      Mg     1.7     [09-18-20 @ 10:25]      Phos  2.4     [09-18-20 @ 10:25]    TPro  8.0  /  Alb  4.8  /  TBili  0.4  /  DBili  x   /  AST  16  /  ALT  15  /  AlkPhos  88  [09-17-20 @ 03:00]        Uric acid 5.5      [09-17-20 @ 04:50]  Serum Osmolality 287      [09-18-20 @ 04:30]    Creatinine Trend:  SCr 1.07 [09-18 @ 10:25]  SCr 1.09 [09-18 @ 04:30]  SCr 1.22 [09-17 @ 21:25]  SCr 1.03 [09-17 @ 04:50]  SCr 1.18 [09-17 @ 03:00]    Urinalysis - [09-17-20 @ 03:00]      Color COLORLESS / Appearance CLEAR / SG 1.005 / pH 6.5      Gluc 300 / Ketone TRACE  / Bili NEGATIVE / Urobili NORMAL       Blood NEGATIVE / Protein NEGATIVE / Leuk Est NEGATIVE / Nitrite NEGATIVE      RBC  / WBC  / Hyaline  / Gran  / Sq Epi  / Non Sq Epi  / Bacteria     Urine Creatinine 22.80      [09-17-20 @ 06:38]  Urine Sodium 35      [09-18-20 @ 05:54]  Urine Osmolality 238      [09-18-20 @ 05:54]    TSH 0.61      [09-17-20 @ 04:50]

## 2020-09-18 NOTE — PROGRESS NOTE ADULT - ASSESSMENT
50 y F with hx of T2DM, HTN, hypothyroidism due to thyroidectomy for thyroid cancer, presenting with headaches, nausea, and flank pain for about 2 days. Patient was recently diagnosed with a UTI and completed a 10 day course of nitrofurantoin. She is now taking oral ciprofloxacin. In the ED, patient found to be hyponatremic to Mx=149. Patient has been drinking more water recently for her UTI and has been taking HCTZ for her hypertension. CT a/p without contrast showed no stones but mild hydronephrosis. UA clear. Pt initially hypokalemic on admission, but K was repleted and normalized. Patient admitted for management of her hyponatremia.

## 2020-09-18 NOTE — DISCHARGE NOTE PROVIDER - NSDCCPCAREPLAN_GEN_ALL_CORE_FT
PRINCIPAL DISCHARGE DIAGNOSIS  Diagnosis: Hyponatremia  Assessment and Plan of Treatment: You came to the hospital for nausea, headaches, and flank pain. You were found to have a low sodium level in your blood, which may have contributed to your symptoms. In the hospital, we held hydrochlorothiazide which can cause hyponatremia. Your sodium levels self-corrected but they corrected too rapidly. We started you on intravenous fluids to prevent the rapid correction Your symptoms improved with this treatment. Upon discharge, please follow up with your primary care provider within 2 weeks after discharge. Please also stop taking hydrochlorothiazide as this medication likely caused your symptoms. Please also refrain from drinking too much of water. If you experience fatigue, headaches, and nausea or vomiting, or increased pain, please come back to the emergency room.

## 2020-09-18 NOTE — PROGRESS NOTE ADULT - PROBLEM SELECTOR PLAN 2
- Recently diagnosed with UTI, treated with 10 days of nitrofurantoin. Symptoms persisted and patient was started on ciprofloxacin, on cipro for 2 days.   -dysuria resolved but now is having some mild flank pain/CVA tenderness.  - UA negative   - UCx pending  - CT a/p without contrast showed no stones, mild R hydronephrosis. Unable to assess for pyelonephritis because noncontrast study.  - received one dose of ceftriaxone in ED. Will observe off antibiotics for now, given mild flank pain, negative UA. - Recently diagnosed with UTI, treated with 10 days of nitrofurantoin. Symptoms persisted and patient was started on ciprofloxacin, on cipro for 2 days.   -dysuria resolved but now is having some mild flank pain/CVA tenderness.  - UA negative   - UCx pending  - CT a/p without contrast showed no stones, mild R hydronephrosis. Unable to assess for pyelonephritis because noncontrast study.  - received one dose of ceftriaxone in ED. Will observe off antibiotics for now, given mild flank pain, negative UA, lack of infectious sx

## 2020-09-18 NOTE — DISCHARGE NOTE PROVIDER - HOSPITAL COURSE
50 year old female with hx of T2DM, HTN, hypothyroidism 2/2 thyroidectomy for thyroid cancer, presented with headaches, nausea, and bilateral flank pain. Patient was recently treated for a UTI with nitrofurantoin. In the ED, patient found to be hyponatremic, with Na levels down to 119. 50 year old female with hx of T2DM, HTN, hypothyroidism 2/2 thyroidectomy for thyroid cancer, presented with headaches, nausea, and bilateral flank pain. Patient was recently treated for a UTI with nitrofurantoin. In the ED, patient found to be hyponatremic, with Na levels down to 119. CT abdomen/pelvis without contrast showed no stones but mild right hydronephrosis. Urinalysis was unremarkable. Patient was admitted for further management of her hyponatremia. Patient was placed on fluid restriction and her home HCTC was held. Patient's Na levels self-corrected to 135. Patient was started on Dextrose 5% for overcorrection of Na.    Patient was medically optimized for discharge on ___.    50 year old female with hx of T2DM, HTN, hypothyroidism 2/2 thyroidectomy for thyroid cancer, presented with headaches, nausea, and bilateral flank pain. Patient was recently treated for a UTI with nitrofurantoin. In the ED, patient found to be hyponatremic, with Na levels down to 119. CT abdomen/pelvis without contrast showed no stones but mild right hydronephrosis. Urinalysis was unremarkable. Patient was admitted for further management of her hyponatremia. Patient was placed on fluid restriction and her home HCTC was held. Patient's Na levels self-corrected to 135. Patient was started on Dextrose 5% for overcorrection of Na.    Patient was medically optimized for discharge on 9/19.    50 year old female with hx of T2DM, HTN, hypothyroidism 2/2 thyroidectomy for thyroid cancer, presented with headaches, nausea, and bilateral flank pain. Patient was recently treated for a UTI with nitrofurantoin. In the ED, patient found to be hyponatremic, with Na levels down to 119. CT abdomen/pelvis without contrast showed no stones but mild right hydronephrosis. Urinalysis was unremarkable. Patient was admitted for further management of her hyponatremia. Patient was placed on fluid restriction and her home HCTC was held. Patient's Na levels self-corrected to 135. Patient was started on Dextrose 5% for overcorrection of Na.    Patient was medically optimized for discharge on 9/19.   Discontinued thiazide. Discharged with amlodipine rx for BP.

## 2020-09-19 ENCOUNTER — TRANSCRIPTION ENCOUNTER (OUTPATIENT)
Age: 50
End: 2020-09-19

## 2020-09-19 VITALS
DIASTOLIC BLOOD PRESSURE: 62 MMHG | RESPIRATION RATE: 16 BRPM | TEMPERATURE: 98 F | HEART RATE: 72 BPM | OXYGEN SATURATION: 100 % | SYSTOLIC BLOOD PRESSURE: 126 MMHG

## 2020-09-19 LAB
ANION GAP SERPL CALC-SCNC: 16 MMO/L — HIGH (ref 7–14)
BASOPHILS # BLD AUTO: 0.04 K/UL — SIGNIFICANT CHANGE UP (ref 0–0.2)
BASOPHILS NFR BLD AUTO: 0.5 % — SIGNIFICANT CHANGE UP (ref 0–2)
BUN SERPL-MCNC: 9 MG/DL — SIGNIFICANT CHANGE UP (ref 7–23)
CALCIUM SERPL-MCNC: 8.7 MG/DL — SIGNIFICANT CHANGE UP (ref 8.4–10.5)
CHLORIDE SERPL-SCNC: 97 MMOL/L — LOW (ref 98–107)
CO2 SERPL-SCNC: 24 MMOL/L — SIGNIFICANT CHANGE UP (ref 22–31)
CREAT SERPL-MCNC: 1.05 MG/DL — SIGNIFICANT CHANGE UP (ref 0.5–1.3)
EOSINOPHIL # BLD AUTO: 0.23 K/UL — SIGNIFICANT CHANGE UP (ref 0–0.5)
EOSINOPHIL NFR BLD AUTO: 2.6 % — SIGNIFICANT CHANGE UP (ref 0–6)
GLUCOSE BLDC GLUCOMTR-MCNC: 234 MG/DL — HIGH (ref 70–99)
GLUCOSE BLDC GLUCOMTR-MCNC: 251 MG/DL — HIGH (ref 70–99)
GLUCOSE SERPL-MCNC: 262 MG/DL — HIGH (ref 70–99)
HCT VFR BLD CALC: 35 % — SIGNIFICANT CHANGE UP (ref 34.5–45)
HGB BLD-MCNC: 11.4 G/DL — LOW (ref 11.5–15.5)
IMM GRANULOCYTES NFR BLD AUTO: 0.5 % — SIGNIFICANT CHANGE UP (ref 0–1.5)
LYMPHOCYTES # BLD AUTO: 3.6 K/UL — HIGH (ref 1–3.3)
LYMPHOCYTES # BLD AUTO: 40.9 % — SIGNIFICANT CHANGE UP (ref 13–44)
MAGNESIUM SERPL-MCNC: 1.7 MG/DL — SIGNIFICANT CHANGE UP (ref 1.6–2.6)
MCHC RBC-ENTMCNC: 27.7 PG — SIGNIFICANT CHANGE UP (ref 27–34)
MCHC RBC-ENTMCNC: 32.6 % — SIGNIFICANT CHANGE UP (ref 32–36)
MCV RBC AUTO: 85.2 FL — SIGNIFICANT CHANGE UP (ref 80–100)
MONOCYTES # BLD AUTO: 0.51 K/UL — SIGNIFICANT CHANGE UP (ref 0–0.9)
MONOCYTES NFR BLD AUTO: 5.8 % — SIGNIFICANT CHANGE UP (ref 2–14)
NEUTROPHILS # BLD AUTO: 4.38 K/UL — SIGNIFICANT CHANGE UP (ref 1.8–7.4)
NEUTROPHILS NFR BLD AUTO: 49.7 % — SIGNIFICANT CHANGE UP (ref 43–77)
NRBC # FLD: 0 K/UL — SIGNIFICANT CHANGE UP (ref 0–0)
PHOSPHATE SERPL-MCNC: 2.9 MG/DL — SIGNIFICANT CHANGE UP (ref 2.5–4.5)
PLATELET # BLD AUTO: 263 K/UL — SIGNIFICANT CHANGE UP (ref 150–400)
PMV BLD: 11.7 FL — SIGNIFICANT CHANGE UP (ref 7–13)
POTASSIUM SERPL-MCNC: 4.2 MMOL/L — SIGNIFICANT CHANGE UP (ref 3.5–5.3)
POTASSIUM SERPL-SCNC: 4.2 MMOL/L — SIGNIFICANT CHANGE UP (ref 3.5–5.3)
RBC # BLD: 4.11 M/UL — SIGNIFICANT CHANGE UP (ref 3.8–5.2)
RBC # FLD: 13.1 % — SIGNIFICANT CHANGE UP (ref 10.3–14.5)
SODIUM SERPL-SCNC: 137 MMOL/L — SIGNIFICANT CHANGE UP (ref 135–145)
TROPONIN T, HIGH SENSITIVITY: 7 NG/L — SIGNIFICANT CHANGE UP (ref ?–14)
WBC # BLD: 8.8 K/UL — SIGNIFICANT CHANGE UP (ref 3.8–10.5)
WBC # FLD AUTO: 8.8 K/UL — SIGNIFICANT CHANGE UP (ref 3.8–10.5)

## 2020-09-19 PROCEDURE — 99239 HOSP IP/OBS DSCHRG MGMT >30: CPT | Mod: GC

## 2020-09-19 PROCEDURE — 99232 SBSQ HOSP IP/OBS MODERATE 35: CPT | Mod: GC

## 2020-09-19 RX ORDER — AMLODIPINE BESYLATE 2.5 MG/1
5 TABLET ORAL ONCE
Refills: 0 | Status: COMPLETED | OUTPATIENT
Start: 2020-09-19 | End: 2020-09-19

## 2020-09-19 RX ORDER — ACETAMINOPHEN 500 MG
650 TABLET ORAL EVERY 6 HOURS
Refills: 0 | Status: DISCONTINUED | OUTPATIENT
Start: 2020-09-19 | End: 2020-09-19

## 2020-09-19 RX ORDER — PANTOPRAZOLE SODIUM 20 MG/1
40 TABLET, DELAYED RELEASE ORAL
Refills: 0 | Status: DISCONTINUED | OUTPATIENT
Start: 2020-09-19 | End: 2020-09-19

## 2020-09-19 RX ORDER — AMLODIPINE BESYLATE 2.5 MG/1
1 TABLET ORAL
Qty: 30 | Refills: 0
Start: 2020-09-19 | End: 2020-10-18

## 2020-09-19 RX ADMIN — Medication 3: at 08:37

## 2020-09-19 RX ADMIN — SODIUM CHLORIDE 100 MILLILITER(S): 9 INJECTION, SOLUTION INTRAVENOUS at 01:09

## 2020-09-19 RX ADMIN — PANTOPRAZOLE SODIUM 40 MILLIGRAM(S): 20 TABLET, DELAYED RELEASE ORAL at 06:16

## 2020-09-19 RX ADMIN — Medication 2: at 12:47

## 2020-09-19 RX ADMIN — LOSARTAN POTASSIUM 100 MILLIGRAM(S): 100 TABLET, FILM COATED ORAL at 05:30

## 2020-09-19 RX ADMIN — AMLODIPINE BESYLATE 5 MILLIGRAM(S): 2.5 TABLET ORAL at 12:46

## 2020-09-19 RX ADMIN — Medication 175 MICROGRAM(S): at 05:30

## 2020-09-19 RX ADMIN — HEPARIN SODIUM 5000 UNIT(S): 5000 INJECTION INTRAVENOUS; SUBCUTANEOUS at 05:30

## 2020-09-19 NOTE — PROGRESS NOTE ADULT - SUBJECTIVE AND OBJECTIVE BOX
PROGRESS NOTE:     Patient is a 50y old  Female who presents with a chief complaint of Hyponatremia (19 Sep 2020 08:28)      SUBJECTIVE / OVERNIGHT EVENTS:        MEDICATIONS  (STANDING):  dextrose 5%. 1000 milliLiter(s) (50 mL/Hr) IV Continuous <Continuous>  dextrose 50% Injectable 12.5 Gram(s) IV Push once  dextrose 50% Injectable 25 Gram(s) IV Push once  dextrose 50% Injectable 25 Gram(s) IV Push once  diltiazem    milliGRAM(s) Oral daily  heparin   Injectable 5000 Unit(s) SubCutaneous every 8 hours  influenza   Vaccine 0.5 milliLiter(s) IntraMuscular once  insulin lispro (HumaLOG) corrective regimen sliding scale   SubCutaneous three times a day before meals  insulin lispro (HumaLOG) corrective regimen sliding scale   SubCutaneous at bedtime  levothyroxine 150 MICROGram(s) Oral <User Schedule>  levothyroxine 175 MICROGram(s) Oral <User Schedule>  losartan 100 milliGRAM(s) Oral daily  pantoprazole    Tablet 40 milliGRAM(s) Oral before breakfast    MEDICATIONS  (PRN):  acetaminophen   Tablet .. 650 milliGRAM(s) Oral every 6 hours PRN Temp greater or equal to 38C (100.4F), Moderate Pain (4 - 6)  dextrose 40% Gel 15 Gram(s) Oral once PRN Blood Glucose LESS THAN 70 milliGRAM(s)/deciliter  glucagon  Injectable 1 milliGRAM(s) IntraMuscular once PRN Glucose LESS THAN 70 milligrams/deciliter      CAPILLARY BLOOD GLUCOSE      POCT Blood Glucose.: 251 mg/dL (19 Sep 2020 08:33)  POCT Blood Glucose.: 226 mg/dL (18 Sep 2020 21:26)  POCT Blood Glucose.: 252 mg/dL (18 Sep 2020 17:37)  POCT Blood Glucose.: 263 mg/dL (18 Sep 2020 12:04)    I&O's Summary    18 Sep 2020 07:01  -  19 Sep 2020 07:00  --------------------------------------------------------  IN: 2600 mL / OUT: 1800 mL / NET: 800 mL        PHYSICAL EXAM:  Vital Signs Last 24 Hrs  T(C): 36.4 (19 Sep 2020 05:25), Max: 37.1 (18 Sep 2020 21:06)  T(F): 97.6 (19 Sep 2020 05:25), Max: 98.7 (18 Sep 2020 21:06)  HR: 64 (19 Sep 2020 05:25) (64 - 83)  BP: 124/62 (19 Sep 2020 05:25) (124/62 - 141/82)  BP(mean): --  RR: 16 (19 Sep 2020 05:25) (16 - 17)  SpO2: 100% (19 Sep 2020 05:25) (97% - 100%)    CONSTITUTIONAL: NAD, well-developed  RESPIRATORY: Normal respiratory effort; lungs are clear to auscultation bilaterally  CARDIOVASCULAR: Regular rate and rhythm, normal S1 and S2, no murmur/rub/gallop; No lower extremity edema; Peripheral pulses are 2+ bilaterally  ABDOMEN: Nontender to palpation, normoactive bowel sounds, no rebound/guarding; No hepatosplenomegaly  MUSCLOSKELETAL: no clubbing or cyanosis of digits; no joint swelling or tenderness to palpation  NEURO: CN 2-12 grossly intact, moves all limbs spontaneously  PSYCH: A+O to person, place, and time; affect appropriate    LABS:                        11.4   8.80  )-----------( 263      ( 19 Sep 2020 06:40 )             35.0     09-19    137  |  97<L>  |  9   ----------------------------<  262<H>  4.2   |  24  |  1.05    Ca    8.7      19 Sep 2020 06:40  Phos  2.9     09-19  Mg     1.7     09-19                Culture - Urine (collected 17 Sep 2020 03:51)  Source: .Urine Clean Catch (Midstream)  Final Report (18 Sep 2020 06:54):    <10,000 CFU/mL Normal Urogenital Delfina        RADIOLOGY & ADDITIONAL TESTS:  Results Reviewed:   Imaging Personally Reviewed:  Electrocardiogram Personally Reviewed:    COORDINATION OF CARE:  Care Discussed with Consultants/Other Providers [Y/N]:  Prior or Outpatient Records Reviewed [Y/N]:   PROGRESS NOTE:     Patient is a 50y old  Female who presents with a chief complaint of Hyponatremia (19 Sep 2020 08:28)      SUBJECTIVE / OVERNIGHT EVENTS: Patient seen and examined at bedside w/ no subjective complaints. Patient states she is feeling better.         MEDICATIONS  (STANDING):  dextrose 5%. 1000 milliLiter(s) (50 mL/Hr) IV Continuous <Continuous>  dextrose 50% Injectable 12.5 Gram(s) IV Push once  dextrose 50% Injectable 25 Gram(s) IV Push once  dextrose 50% Injectable 25 Gram(s) IV Push once  diltiazem    milliGRAM(s) Oral daily  heparin   Injectable 5000 Unit(s) SubCutaneous every 8 hours  influenza   Vaccine 0.5 milliLiter(s) IntraMuscular once  insulin lispro (HumaLOG) corrective regimen sliding scale   SubCutaneous three times a day before meals  insulin lispro (HumaLOG) corrective regimen sliding scale   SubCutaneous at bedtime  levothyroxine 150 MICROGram(s) Oral <User Schedule>  levothyroxine 175 MICROGram(s) Oral <User Schedule>  losartan 100 milliGRAM(s) Oral daily  pantoprazole    Tablet 40 milliGRAM(s) Oral before breakfast    MEDICATIONS  (PRN):  acetaminophen   Tablet .. 650 milliGRAM(s) Oral every 6 hours PRN Temp greater or equal to 38C (100.4F), Moderate Pain (4 - 6)  dextrose 40% Gel 15 Gram(s) Oral once PRN Blood Glucose LESS THAN 70 milliGRAM(s)/deciliter  glucagon  Injectable 1 milliGRAM(s) IntraMuscular once PRN Glucose LESS THAN 70 milligrams/deciliter      CAPILLARY BLOOD GLUCOSE      POCT Blood Glucose.: 251 mg/dL (19 Sep 2020 08:33)  POCT Blood Glucose.: 226 mg/dL (18 Sep 2020 21:26)  POCT Blood Glucose.: 252 mg/dL (18 Sep 2020 17:37)  POCT Blood Glucose.: 263 mg/dL (18 Sep 2020 12:04)    I&O's Summary    18 Sep 2020 07:01  -  19 Sep 2020 07:00  --------------------------------------------------------  IN: 2600 mL / OUT: 1800 mL / NET: 800 mL        PHYSICAL EXAM:  Vital Signs Last 24 Hrs  T(C): 36.4 (19 Sep 2020 05:25), Max: 37.1 (18 Sep 2020 21:06)  T(F): 97.6 (19 Sep 2020 05:25), Max: 98.7 (18 Sep 2020 21:06)  HR: 64 (19 Sep 2020 05:25) (64 - 83)  BP: 124/62 (19 Sep 2020 05:25) (124/62 - 141/82)  BP(mean): --  RR: 16 (19 Sep 2020 05:25) (16 - 17)  SpO2: 100% (19 Sep 2020 05:25) (97% - 100%)    General: well appearing   HEENT: neck supple, anicteric sclera  Cardiovascular: Normal s1, s2, RRR  Respiratory: CTA b/l   Abdominal: Soft, ntnd  Extremities: No swelling in LEs  Neurologic: Non focal  Psych: Awake, alert answering questions appropriately    LABS:                        11.4   8.80  )-----------( 263      ( 19 Sep 2020 06:40 )             35.0     09-19    137  |  97<L>  |  9   ----------------------------<  262<H>  4.2   |  24  |  1.05    Ca    8.7      19 Sep 2020 06:40  Phos  2.9     09-19  Mg     1.7     09-19      Culture - Urine (collected 17 Sep 2020 03:51)  Source: .Urine Clean Catch (Midstream)  Final Report (18 Sep 2020 06:54):    <10,000 CFU/mL Normal Urogenital Delfina      RADIOLOGY & ADDITIONAL TESTS:  Results Reviewed:   Imaging Personally Reviewed:  Electrocardiogram Personally Reviewed:    COORDINATION OF CARE:  Care Discussed with Consultants/Other Providers [Y/N]:  Prior or Outpatient Records Reviewed [Y/N]:

## 2020-09-19 NOTE — PROGRESS NOTE ADULT - PROBLEM SELECTOR PLAN 6
DVT ppx: heparin subq  Diet: CC diet  Dispo: home pending clinical improvement  Code: Full code
DVT ppx: heparin subq  Diet: CC diet  Dispo: home pending clinical improvement  Code: Full code

## 2020-09-19 NOTE — PROVIDER CONTACT NOTE (OTHER) - ASSESSMENT
sharp abdominal pain 5/10 on upper left quadrant near the epigastric area; not in distress, alert and responsive, denies n/v, SOB or chest pain; v/s: T 97.6 P 64 R 16 /62 POX %

## 2020-09-19 NOTE — PROGRESS NOTE ADULT - PROBLEM SELECTOR PLAN 1
Na 119 on admission, s/p 2L LR and 1L NS, urine sodium 41 and osmolality 144.  - Likely due to HCTZ use, but primary polydipsia may be contributory as well.  - Held HCTZ  - Urine Na 35 and osmolality 238  - Overcorrected Na this AM, now Na 135. Started D5 at 100ml/hr  - F/u BMP @14:30 (q4h) Na 119 on admission, s/p 2L LR and 1L NS, urine sodium 41 and osmolality 144.  - Likely due to HCTZ use, but primary polydipsia may be contributory as well.  - Held HCTZ  - Urine Na 35 and osmolality 238  - Overcorrected Na this AM, now Na 137

## 2020-09-19 NOTE — PROGRESS NOTE ADULT - PROBLEM SELECTOR PLAN 1
Pt with hypotonic, hypo-osmolar hyponatremia in the setting of HCTZ use. On admission, pt with serum Na low at 119 (9/17/20). Pt with SNa: 133 increased on repeat labs done 18 hours after. Pt with SNa: 135 yesterday (9/18/20). Pt started on D5W for overcorrection of hyponatremia. Pt with repeat SNa: 137 WNL today. UOsm: 238, Trevor 35. Pt clinically euvolemic and without symptoms. Recommend to stop IVF. Monitor SNa every 12 hours. Advised to stop  HCTZ.    If you have any questions, please feel free to contact me  Teresa Guaman  Nephrology Fellow  Pager: [phone] / [phone]  (After 5pm or on weekends please page the on-call fellow) Pt. with hypotonic, hypo-osmolar hyponatremia in the setting of HCTZ use. On admission, SNa low at 119 (9/17/20). SNa increased to 133 on repeat labs done 18 hours after. SNa was 135 yesterday (9/18/20). Pt. started on IV D5W for overcorrection of hyponatremia. SNa WNL at 137 today. UOsm was 238 and Trevor was 35. Pt. clinically euvolemic and without symptoms. Recommend to discontinue IVF. HCTZ discontinued. Monitor SNa daily.     Will discontinue follow-up. Reconsult as needed  If you have any questions, please feel free to contact me  Teresa Guaman  Nephrology Fellow  Pager: [phone] / [phone]  (After 5pm or on weekends please page the on-call fellow)

## 2020-09-19 NOTE — PROGRESS NOTE ADULT - ASSESSMENT
50 y F with hx of T2DM, HTN, hypothyroidism due to thyroidectomy for thyroid cancer, presenting with headaches, nausea, and flank pain for about 2 days. Patient was recently diagnosed with a UTI and completed a 10 day course of nitrofurantoin. She is now taking oral ciprofloxacin. In the ED, patient found to be hyponatremic to Vi=033. Patient has been drinking more water recently for her UTI and has been taking HCTZ for her hypertension. CT a/p without contrast showed no stones but mild hydronephrosis. UA clear. Pt initially hypokalemic on admission, but K was repleted and normalized. Patient admitted for management of her hyponatremia.

## 2020-09-19 NOTE — DISCHARGE NOTE NURSING/CASE MANAGEMENT/SOCIAL WORK - PATIENT PORTAL LINK FT
You can access the FollowMyHealth Patient Portal offered by [facility] by registering at the following website: [email or web address]. By joining [facility]’s FollowMyHealth portal, you will also be able to view your health information using other applications (apps) compatible with our system.

## 2020-09-19 NOTE — PROGRESS NOTE ADULT - PROBLEM SELECTOR PLAN 7
Transitions of Care Status:  1.  Name of PCP: Dr. Aurelia Martínez  2.  PCP Contacted on Admission: [ ] Y    [ ] N    3.  PCP contacted at Discharge: [ ] Y    [ ] N    [ ] N/A  4.  Post-Discharge Appointment Date and Location:  5.  Summary of Handoff given to PCP:
Transitions of Care Status:  1.  Name of PCP: Dr. Aurelia Martínez  2.  PCP Contacted on Admission: [ ] Y    [ ] N    3.  PCP contacted at Discharge: [ ] Y    [ ] N    [ ] N/A  4.  Post-Discharge Appointment Date and Location:  5.  Summary of Handoff given to PCP:

## 2020-09-19 NOTE — PROGRESS NOTE ADULT - ATTENDING COMMENTS
50 W T2DM, essential HTN, hypothyroidism p/w HA, nausea, flank pain. Found to have severe hyponatremia in setting of thiazide use. Received IVF in ED. No further fluid or intervention offered since yesterday but sodium has overcorrected on today's labs. Nephro following. Starting D5W. Neuro exam intact. AOx3.
Patient feels well today and ready to return home.     #hyponatremia  did have rapid correction after admission and required D5. Na stable. Plan for discharge with discontinuation of thiazide    Talked about follow up and no longer taking thiazides

## 2020-09-19 NOTE — PROGRESS NOTE ADULT - PROBLEM SELECTOR PLAN 3
Hgb A1c 8.9%  - Glargine 18U, Humalog premeals  - Low ISS
Hgb A1c 8.9%  - Glargine 18U, Humalog premeals  - Low ISS

## 2020-09-19 NOTE — PROGRESS NOTE ADULT - PROBLEM SELECTOR PLAN 2
- Recently diagnosed with UTI, treated with 10 days of nitrofurantoin. Symptoms persisted and patient was started on ciprofloxacin, on cipro for 2 days.   -dysuria resolved but now is having some mild flank pain/CVA tenderness.  - UA negative   - UCx pending  - CT a/p without contrast showed no stones, mild R hydronephrosis. Unable to assess for pyelonephritis because noncontrast study.  - received one dose of ceftriaxone in ED. Will observe off antibiotics for now, given mild flank pain, negative UA, lack of infectious sx

## 2020-09-19 NOTE — PROGRESS NOTE ADULT - SUBJECTIVE AND OBJECTIVE BOX
[facility] Division of Kidney Diseases & Hypertension  FOLLOW UP NOTE  [phone]--------------------------------------------------------------------------------  HPI: 50 year old Female with PMH of HTN, DM, previous thyroid carcinoma s/p thyroidectomy 2008 presented to [facility] for dysuria (previously  treated outpatient for UTI). On admission, pt found to have a low Na: 119 (9/17/20) increased to 133 on repeat labs done 18 hours after. Pt SNa further increased to 135 yesterday (9/18/20). Nephrology following for hyponatremia. Pt previously on HCTZ 25mg PO daily. Pt started on D5W. Pt with repeat SNa: 137 today.     Patient seen and evaluated at bedside this morning. She appears comfortable without signs of distress. Pt currently on D5W at 100cc/hour. Pt denies headaches, lightheadedness, nausea/vomiting, abdominal pain or decreased PO intake.    PAST HISTORY  --------------------------------------------------------------------------------  No significant changes to PMH, PSH, FHx, SHx, unless otherwise noted    ALLERGIES & MEDICATIONS  --------------------------------------------------------------------------------  Allergies  Allergy Status Unknown  Intolerances    Standing Inpatient Medications  dextrose 5%. 1000 milliLiter(s) IV Continuous <Continuous>  dextrose 5%. 1000 milliLiter(s) IV Continuous <Continuous>  dextrose 50% Injectable 12.5 Gram(s) IV Push once  dextrose 50% Injectable 25 Gram(s) IV Push once  dextrose 50% Injectable 25 Gram(s) IV Push once  diltiazem    milliGRAM(s) Oral daily  heparin   Injectable 5000 Unit(s) SubCutaneous every 8 hours  influenza   Vaccine 0.5 milliLiter(s) IntraMuscular once  insulin lispro (HumaLOG) corrective regimen sliding scale   SubCutaneous three times a day before meals  insulin lispro (HumaLOG) corrective regimen sliding scale   SubCutaneous at bedtime  levothyroxine 150 MICROGram(s) Oral <User Schedule>  levothyroxine 175 MICROGram(s) Oral <User Schedule>  losartan 100 milliGRAM(s) Oral daily  pantoprazole    Tablet 40 milliGRAM(s) Oral before breakfast    PRN Inpatient Medications  acetaminophen   Tablet .. 650 milliGRAM(s) Oral every 6 hours PRN  dextrose 40% Gel 15 Gram(s) Oral once PRN  glucagon  Injectable 1 milliGRAM(s) IntraMuscular once PRN    REVIEW OF SYSTEMS  --------------------------------------------------------------------------------  Gen: No  fevers/chills  Skin: No rashes  Head/Eyes/Ears/Mouth: No headache  Respiratory: No dyspnea  CV: No chest pain  GI: No abdominal pain, diarrhea  : No increased frequency, dysuria  MSK: No joint pain/swelling  Neuro: No dizziness/lightheadedness    All other systems were reviewed and are negative, except as noted.    VITALS/PHYSICAL EXAM  --------------------------------------------------------------------------------  T(C): 36.4 (09-19-20 @ 05:25), Max: 37.1 (09-18-20 @ 21:06)  HR: 64 (09-19-20 @ 05:25) (64 - 83)  BP: 124/62 (09-19-20 @ 05:25) (124/62 - 141/82)  RR: 16 (09-19-20 @ 05:25) (16 - 17)  SpO2: 100% (09-19-20 @ 05:25) (97% - 100%)  Wt(kg): --  Height (cm): 165.1 (09-17-20 @ 17:39)  Weight (kg): 91.2 (09-17-20 @ 17:39)  BMI (kg/m2): 33.5 (09-17-20 @ 17:39)  BSA (m2): 1.98 (09-17-20 @ 17:39)    09-18-20 @ 07:01  -  09-19-20 @ 07:00  --------------------------------------------------------  IN: 2600 mL / OUT: 1800 mL / NET: 800 mL    Physical Exam:  	Gen: NAD, well-appearing  	HEENT: no JVD, MMM  	Pulm: CTA B/L  	CV: RRR, S1S2;  	Abd: +BS, soft, nontender/nondistended  	: No suprapubic tenderness              Extremities: no bilateral LE edema noted.                Neuro: awake, responds to commands, AOx4  	Skin: Warm, without rashes  	Vascular access: peripheral IV lines    LABS/STUDIES  --------------------------------------------------------------------------------              11.4   8.80  >-----------<  263      [09-19-20 @ 06:40]              35.0     137  |  97  |  9   ----------------------------<  262      [09-19-20 @ 06:40]  4.2   |  24  |  1.05        Ca     8.7     [09-19-20 @ 06:40]      Mg     1.7     [09-19-20 @ 06:40]      Phos  2.9     [09-19-20 @ 06:40]    Serum Osmolality 287      [09-18-20 @ 04:30]    Creatinine Trend:  SCr 1.05 [09-19 @ 06:40]  SCr 1.07 [09-18 @ 21:50]  SCr 1.08 [09-18 @ 17:02]  SCr 1.07 [09-18 @ 10:25]  SCr 1.09 [09-18 @ 04:30]    Urinalysis - [09-17-20 @ 03:00]      Color COLORLESS / Appearance CLEAR / SG 1.005 / pH 6.5      Gluc 300 / Ketone TRACE  / Bili NEGATIVE / Urobili NORMAL       Blood NEGATIVE / Protein NEGATIVE / Leuk Est NEGATIVE / Nitrite NEGATIVE      RBC  / WBC  / Hyaline  / Gran  / Sq Epi  / Non Sq Epi  / Bacteria     Urine Creatinine 22.80      [09-17-20 @ 06:38]  Urine Sodium 35      [09-18-20 @ 05:54]  Urine Osmolality 238      [09-18-20 @ 05:54]    TSH 0.61      [09-17-20 @ 04:50] [facility] Division of Kidney Diseases & Hypertension  FOLLOW UP NOTE  [phone]--------------------------------------------------------------------------------  HPI: 50 year old Female with PMH of HTN, DM, previous thyroid carcinoma s/p thyroidectomy 2008 presented to [facility] for dysuria (previously  treated outpatient for UTI). On admission, pt found to have a low Na: 119 (9/17/20) increased to 133 on repeat labs done 18 hours after. Pt SNa further increased to 135 yesterday (9/18/20). Nephrology team following for hyponatremia. Pt previously on HCTZ 25mg PO daily. Pt started on D5W. Pt with repeat SNa: 137 today.     Patient seen and evaluated at bedside this morning. She appears comfortable without signs of distress. Pt currently on D5W at 100cc/hour. Pt denies headaches, lightheadedness, nausea/vomiting, abdominal pain or decreased PO intake.    PAST HISTORY  --------------------------------------------------------------------------------  No significant changes to PMH, PSH, FHx, SHx, unless otherwise noted    ALLERGIES & MEDICATIONS  --------------------------------------------------------------------------------  Allergies  Allergy Status Unknown  Intolerances    Standing Inpatient Medications  dextrose 5%. 1000 milliLiter(s) IV Continuous <Continuous>  dextrose 5%. 1000 milliLiter(s) IV Continuous <Continuous>  dextrose 50% Injectable 12.5 Gram(s) IV Push once  dextrose 50% Injectable 25 Gram(s) IV Push once  dextrose 50% Injectable 25 Gram(s) IV Push once  diltiazem    milliGRAM(s) Oral daily  heparin   Injectable 5000 Unit(s) SubCutaneous every 8 hours  influenza   Vaccine 0.5 milliLiter(s) IntraMuscular once  insulin lispro (HumaLOG) corrective regimen sliding scale   SubCutaneous three times a day before meals  insulin lispro (HumaLOG) corrective regimen sliding scale   SubCutaneous at bedtime  levothyroxine 150 MICROGram(s) Oral <User Schedule>  levothyroxine 175 MICROGram(s) Oral <User Schedule>  losartan 100 milliGRAM(s) Oral daily  pantoprazole    Tablet 40 milliGRAM(s) Oral before breakfast    PRN Inpatient Medications  acetaminophen   Tablet .. 650 milliGRAM(s) Oral every 6 hours PRN  dextrose 40% Gel 15 Gram(s) Oral once PRN  glucagon  Injectable 1 milliGRAM(s) IntraMuscular once PRN    REVIEW OF SYSTEMS  --------------------------------------------------------------------------------  Gen: No  fevers/chills  Skin: No rashes  Head/Eyes/Ears/Mouth: No headache  Respiratory: No dyspnea  CV: No chest pain  GI: No abdominal pain, diarrhea  : No increased frequency, dysuria  MSK: No joint pain/swelling  Neuro: No dizziness/lightheadedness    All other systems were reviewed and are negative, except as noted.    VITALS/PHYSICAL EXAM  --------------------------------------------------------------------------------  T(C): 36.4 (09-19-20 @ 05:25), Max: 37.1 (09-18-20 @ 21:06)  HR: 64 (09-19-20 @ 05:25) (64 - 83)  BP: 124/62 (09-19-20 @ 05:25) (124/62 - 141/82)  RR: 16 (09-19-20 @ 05:25) (16 - 17)  SpO2: 100% (09-19-20 @ 05:25) (97% - 100%)  Wt(kg): --  Height (cm): 165.1 (09-17-20 @ 17:39)  Weight (kg): 91.2 (09-17-20 @ 17:39)  BMI (kg/m2): 33.5 (09-17-20 @ 17:39)  BSA (m2): 1.98 (09-17-20 @ 17:39)    09-18-20 @ 07:01  -  09-19-20 @ 07:00  --------------------------------------------------------  IN: 2600 mL / OUT: 1800 mL / NET: 800 mL    Physical Exam:  	Gen: NAD, well-appearing  	HEENT: no JVD, MMM  	Pulm: CTA B/L  	CV: RRR, S1S2;  	Abd: +BS, soft, nontender/nondistended  	: No suprapubic tenderness              Extremities: no bilateral LE edema noted.                Neuro: awake, responds to commands, AOx4  	Skin: Warm, without rashes  	Vascular access: peripheral IV lines    LABS/STUDIES  --------------------------------------------------------------------------------              11.4   8.80  >-----------<  263      [09-19-20 @ 06:40]              35.0     137  |  97  |  9   ----------------------------<  262      [09-19-20 @ 06:40]  4.2   |  24  |  1.05        Ca     8.7     [09-19-20 @ 06:40]      Mg     1.7     [09-19-20 @ 06:40]      Phos  2.9     [09-19-20 @ 06:40]    Serum Osmolality 287      [09-18-20 @ 04:30]    Creatinine Trend:  SCr 1.05 [09-19 @ 06:40]  SCr 1.07 [09-18 @ 21:50]  SCr 1.08 [09-18 @ 17:02]  SCr 1.07 [09-18 @ 10:25]  SCr 1.09 [09-18 @ 04:30]    Urinalysis - [09-17-20 @ 03:00]      Color COLORLESS / Appearance CLEAR / SG 1.005 / pH 6.5      Gluc 300 / Ketone TRACE  / Bili NEGATIVE / Urobili NORMAL       Blood NEGATIVE / Protein NEGATIVE / Leuk Est NEGATIVE / Nitrite NEGATIVE      RBC  / WBC  / Hyaline  / Gran  / Sq Epi  / Non Sq Epi  / Bacteria     Urine Creatinine 22.80      [09-17-20 @ 06:38]  Urine Sodium 35      [09-18-20 @ 05:54]  Urine Osmolality 238      [09-18-20 @ 05:54]    TSH 0.61      [09-17-20 @ 04:50] [facility] Division of Kidney Diseases & Hypertension  FOLLOW UP NOTE  [phone]--------------------------------------------------------------------------------    HPI: 50 year old Female with PMH of HTN, DM, previous thyroid carcinoma s/p thyroidectomy 2008 presented to [facility] for dysuria (previously  treated outpatient for UTI). On admission, pt found to have a low Na: 119 (9/17/20) increased to 133 on repeat labs done 18 hours after. Pt SNa further increased to 135 yesterday (9/18/20). Nephrology team following for hyponatremia. Pt previously on HCTZ 25mg PO daily. Pt started on D5W. Pt with repeat SNa: 137 today.     Patient seen and evaluated at bedside this morning. She appears comfortable without signs of distress. Pt currently on D5W at 100cc/hour. Pt denies headaches, lightheadedness, nausea/vomiting, abdominal pain or decreased PO intake.    PAST HISTORY  --------------------------------------------------------------------------------  No significant changes to PMH, PSH, FHx, SHx, unless otherwise noted    ALLERGIES & MEDICATIONS  --------------------------------------------------------------------------------  Allergies  Allergy Status Unknown  Intolerances    Standing Inpatient Medications  dextrose 5%. 1000 milliLiter(s) IV Continuous <Continuous>  dextrose 5%. 1000 milliLiter(s) IV Continuous <Continuous>  dextrose 50% Injectable 12.5 Gram(s) IV Push once  dextrose 50% Injectable 25 Gram(s) IV Push once  dextrose 50% Injectable 25 Gram(s) IV Push once  diltiazem    milliGRAM(s) Oral daily  heparin   Injectable 5000 Unit(s) SubCutaneous every 8 hours  influenza   Vaccine 0.5 milliLiter(s) IntraMuscular once  insulin lispro (HumaLOG) corrective regimen sliding scale   SubCutaneous three times a day before meals  insulin lispro (HumaLOG) corrective regimen sliding scale   SubCutaneous at bedtime  levothyroxine 150 MICROGram(s) Oral <User Schedule>  levothyroxine 175 MICROGram(s) Oral <User Schedule>  losartan 100 milliGRAM(s) Oral daily  pantoprazole    Tablet 40 milliGRAM(s) Oral before breakfast    PRN Inpatient Medications  acetaminophen   Tablet .. 650 milliGRAM(s) Oral every 6 hours PRN  dextrose 40% Gel 15 Gram(s) Oral once PRN  glucagon  Injectable 1 milliGRAM(s) IntraMuscular once PRN    REVIEW OF SYSTEMS  --------------------------------------------------------------------------------  Gen: No  fevers/chills  Skin: No rashes  Head/Eyes/Ears/Mouth: No headache  Respiratory: No dyspnea  CV: No chest pain  GI: No abdominal pain, diarrhea  : No increased frequency, dysuria  MSK: No joint pain/swelling  Neuro: No dizziness/lightheadedness    All other systems were reviewed and are negative, except as noted.    VITALS/PHYSICAL EXAM  --------------------------------------------------------------------------------  T(C): 36.4 (09-19-20 @ 05:25), Max: 37.1 (09-18-20 @ 21:06)  HR: 64 (09-19-20 @ 05:25) (64 - 83)  BP: 124/62 (09-19-20 @ 05:25) (124/62 - 141/82)  RR: 16 (09-19-20 @ 05:25) (16 - 17)  SpO2: 100% (09-19-20 @ 05:25) (97% - 100%)  Wt(kg): --  Height (cm): 165.1 (09-17-20 @ 17:39)  Weight (kg): 91.2 (09-17-20 @ 17:39)  BMI (kg/m2): 33.5 (09-17-20 @ 17:39)  BSA (m2): 1.98 (09-17-20 @ 17:39)    09-18-20 @ 07:01  -  09-19-20 @ 07:00  --------------------------------------------------------  IN: 2600 mL / OUT: 1800 mL / NET: 800 mL    Physical Exam:  	Gen: NAD, well-appearing  	HEENT: no JVD, MMM  	Pulm: CTA B/L  	CV: RRR, S1S2;  	Abd: +BS, soft, nontender/nondistended  	: No suprapubic tenderness              Extremities: no bilateral LE edema noted.                Neuro: awake, responds to commands, AOx4  	Skin: Warm, without rashes  	Vascular access: peripheral IV lines    LABS/STUDIES  --------------------------------------------------------------------------------              11.4   8.80  >-----------<  263      [09-19-20 @ 06:40]              35.0     137  |  97  |  9   ----------------------------<  262      [09-19-20 @ 06:40]  4.2   |  24  |  1.05        Ca     8.7     [09-19-20 @ 06:40]      Mg     1.7     [09-19-20 @ 06:40]      Phos  2.9     [09-19-20 @ 06:40]    Serum Osmolality 287      [09-18-20 @ 04:30]    Creatinine Trend:  SCr 1.05 [09-19 @ 06:40]  SCr 1.07 [09-18 @ 21:50]  SCr 1.08 [09-18 @ 17:02]  SCr 1.07 [09-18 @ 10:25]  SCr 1.09 [09-18 @ 04:30]    Urinalysis - [09-17-20 @ 03:00]      Color COLORLESS / Appearance CLEAR / SG 1.005 / pH 6.5      Gluc 300 / Ketone TRACE  / Bili NEGATIVE / Urobili NORMAL       Blood NEGATIVE / Protein NEGATIVE / Leuk Est NEGATIVE / Nitrite NEGATIVE      RBC  / WBC  / Hyaline  / Gran  / Sq Epi  / Non Sq Epi  / Bacteria     Urine Creatinine 22.80      [09-17-20 @ 06:38]  Urine Sodium 35      [09-18-20 @ 05:54]  Urine Osmolality 238      [09-18-20 @ 05:54]    TSH 0.61      [09-17-20 @ 04:50]

## 2020-09-19 NOTE — PROVIDER CONTACT NOTE (OTHER) - ACTION/TREATMENT ORDERED:
MD made aware and came to assess patient at bedside, with order to give protonix and tylenol prn; trop lab

## 2020-09-19 NOTE — PROVIDER CONTACT NOTE (OTHER) - RECOMMENDATIONS
MD to assess patient at bedside, administer tylenol (patient refused at this time and took protonix instead)

## 2020-09-22 ENCOUNTER — TRANSCRIPTION ENCOUNTER (OUTPATIENT)
Age: 50
End: 2020-09-22

## 2020-09-29 PROBLEM — Z00.00 ENCOUNTER FOR PREVENTIVE HEALTH EXAMINATION: Noted: 2020-09-29

## 2020-10-22 ENCOUNTER — NON-APPOINTMENT (OUTPATIENT)
Age: 50
End: 2020-10-22

## 2020-10-23 ENCOUNTER — APPOINTMENT (OUTPATIENT)
Dept: INTERNAL MEDICINE | Facility: CLINIC | Age: 50
End: 2020-10-23
Payer: MEDICAID

## 2020-10-23 VITALS
DIASTOLIC BLOOD PRESSURE: 80 MMHG | HEIGHT: 65 IN | WEIGHT: 198 LBS | HEART RATE: 96 BPM | BODY MASS INDEX: 32.99 KG/M2 | SYSTOLIC BLOOD PRESSURE: 170 MMHG | OXYGEN SATURATION: 95 %

## 2020-10-23 VITALS — SYSTOLIC BLOOD PRESSURE: 160 MMHG | DIASTOLIC BLOOD PRESSURE: 80 MMHG

## 2020-10-23 PROCEDURE — 99072 ADDL SUPL MATRL&STAF TM PHE: CPT

## 2020-10-23 PROCEDURE — 99203 OFFICE O/P NEW LOW 30 MIN: CPT | Mod: 25

## 2020-10-23 PROCEDURE — G0447 BEHAVIOR COUNSEL OBESITY 15M: CPT

## 2020-10-23 RX ORDER — DILTIAZEM HYDROCHLORIDE 180 MG/1
180 CAPSULE, EXTENDED RELEASE ORAL
Refills: 0 | Status: COMPLETED | COMMUNITY
End: 2020-10-23

## 2020-10-23 RX ORDER — AMLODIPINE BESYLATE 2.5 MG/1
2.5 TABLET ORAL
Refills: 0 | Status: COMPLETED | COMMUNITY
Start: 2020-10-23 | End: 2020-10-23

## 2020-10-23 RX ORDER — LABETALOL HYDROCHLORIDE 100 MG/1
100 TABLET, FILM COATED ORAL
Refills: 0 | Status: COMPLETED | COMMUNITY
Start: 2020-10-23 | End: 2020-10-23

## 2020-10-23 NOTE — REVIEW OF SYSTEMS
[Chest Pain] : chest pain [Back Pain] : back pain [Fever] : no fever [Chills] : no chills [Vision Problems] : no vision problems [Hearing Loss] : no hearing loss [Shortness Of Breath] : no shortness of breath [Abdominal Pain] : no abdominal pain [Skin Rash] : no skin rash [Headache] : no headache [Suicidal] : not suicidal [Easy Bleeding] : no easy bleeding [FreeTextEntry5] : feels chest pressure with change in medications.  [FreeTextEntry8] : Flank pain

## 2020-10-23 NOTE — ASSESSMENT
[FreeTextEntry1] : 50F with T2DM, HTN, hypothyroidism 2/2 thyroidectomy for thyroid cancer. She presented to the ER on 9/17/2020 with headaches, nausea, and bilateral flank pain and was found to have hyponatremia secondary to hctz use and left hydronephrosis; her antihypertensives were also adjusted during the hospital visit and then were adjusted again by nephrology and her current regimen is causing chest pressure, palpitations. \par \par 1. Hypertension\par -having palpitations and feeling unwell on labetalol. also causing labile BP\par -stop amlodipine and labetalol\par -start nifedipine ER 60 mg daily\par -check bp daily at home; call clinic if remains elevated\par -f/u in 1 month with me\par \par 2. Hyponatremia\par -repeat bmp today\par -reports had ultrasound at nephrologist office that was normal\par -requesting records\par \par 3. Hydronephrosis\par -see above\par \par 4. T2DM\par -A1c from September was 8.9% and she is following with an outside endocrinologist\par -continue current medications now; would be too early to repeat A1c today. \par -extensive discussion on importance of weight loss today\par \par 5. Hypothyroidism\par -TSH within normal limits during hospitalization; c/w current dose for now\par \par 6. chest pressure\par -cardiology referral; checking echocardiogram today as patient reports she was told she had heart murmur in the past and has not repeated echo in many years.

## 2020-10-23 NOTE — PHYSICAL EXAM
[No Varicosities] : no varicosities [No Edema] : there was no peripheral edema [Normal Supraclavicular Nodes] : no supraclavicular lymphadenopathy [Normal] : affect was normal and insight and judgment were intact

## 2020-10-23 NOTE — HEALTH RISK ASSESSMENT
[No] : In the past 12 months have you used drugs other than those required for medical reasons? No [0] : 2) Feeling down, depressed, or hopeless: Not at all (0) [] : No [de-identified] : trying to walk after work [de-identified] : half cup of rice, eating less rice. eating with chicken or fish. eating vegetables like karela. okra.  [[id number]] : 0

## 2020-10-23 NOTE — COUNSELING
[Potential consequences of obesity discussed] : Potential consequences of obesity discussed [Benefits of weight loss discussed] : Benefits of weight loss discussed [Structured Weight Management Program suggested:] : Structured weight management program suggested [Encouraged to maintain food diary] : Encouraged to maintain food diary [Encouraged to increase physical activity] : Encouraged to increase physical activity [Good understanding] : Patient has a good understanding of disease, goals and obesity follow-up plan [FreeTextEntry2] : 1. use food tracking aiden or food diary\par 2. weigh self weekly\par 3. if eating a meal with rice and roti choose one and prioritize eating vegetables in meal first,\par 4. reduce salt intake\par 5. incorporate regular exercise - walking [FreeTextEntry4] : 15

## 2020-10-23 NOTE — HISTORY OF PRESENT ILLNESS
[FreeTextEntry8] : 50F with T2DM, HTN, hypothyroidism 2/2 thyroidectomy for thyroid cancer. She presented to the ER on 9/17/2020 with headaches, nausea, and bilateral flank pain. \par \par She had recently been treated for UTI with nitrofurantoin and ciprofloxacin. \par In the ED, she was hyponatremic, with Na 119 and she received one dose of ceftriaxone. \par CT abdomen/pelvis without contrast showed no stones but mild right hydronephrosis. \par She was treated with fluid restriction and HCTZ was held. When Na was 135, she was started on Dextrose 5% for overcorrection of Na.\par \par On discharge, HCTZ was discontinued and amlodipine was started. \par \par Discharge Medications:\par Basaglar KwikPen 18 units qhs\par glipiZIDE 10 mg BID before meals\par Janumet 1 tablet BID\par levothyroxine 150 mcg daily for 5 days\par levothyroxine 175 mcg daily for 2 days (Sat/Sun)\par Diltiazem  mg daily \par losartan 100 mg daily\par Norvasc 5 mg daily \par \par She went to Dr. Connie Eddy [phone] in [place] and she was given another medication for high blood pressure and she was to continue dilitazem, losartan, amlodipine and she got another one that was to be taken three times a day and she gets palpitations from it. She stopped that medication now. She then added amlodipine 2.5 mg daily and labetalol 100 mg BID. She stopped the diltiazem last Tuesday and has been taking labetalol since. \par \par Dr. Eddy did an ultrasound in the office and hydronephrosis was gone. Creatinine at end of September was 1.3. \par \par

## 2020-10-26 ENCOUNTER — NON-APPOINTMENT (OUTPATIENT)
Age: 50
End: 2020-10-26

## 2020-10-26 LAB
ALBUMIN SERPL ELPH-MCNC: 4.8 G/DL
ALP BLD-CCNC: 92 U/L
ALT SERPL-CCNC: 15 U/L
ANION GAP SERPL CALC-SCNC: 17 MMOL/L
APPEARANCE: CLEAR
AST SERPL-CCNC: 13 U/L
BILIRUB SERPL-MCNC: 0.2 MG/DL
BILIRUBIN URINE: NEGATIVE
BLOOD URINE: NEGATIVE
BUN SERPL-MCNC: 18 MG/DL
CALCIUM SERPL-MCNC: 10.2 MG/DL
CHLORIDE SERPL-SCNC: 101 MMOL/L
CO2 SERPL-SCNC: 22 MMOL/L
COLOR: COLORLESS
CREAT SERPL-MCNC: 1.25 MG/DL
CREAT SPEC-SCNC: 37 MG/DL
GLUCOSE QUALITATIVE U: NEGATIVE
GLUCOSE SERPL-MCNC: 161 MG/DL
HCV AB SER QL: NONREACTIVE
HCV S/CO RATIO: 0.08 S/CO
HIV1+2 AB SPEC QL IA.RAPID: NONREACTIVE
KETONES URINE: NEGATIVE
LEUKOCYTE ESTERASE URINE: NEGATIVE
MICROALBUMIN 24H UR DL<=1MG/L-MCNC: <1.2 MG/DL
MICROALBUMIN/CREAT 24H UR-RTO: NORMAL MG/G
NITRITE URINE: NEGATIVE
PH URINE: 6
POTASSIUM SERPL-SCNC: 4.8 MMOL/L
PROT SERPL-MCNC: 8 G/DL
PROTEIN URINE: NEGATIVE
SODIUM SERPL-SCNC: 140 MMOL/L
SPECIFIC GRAVITY URINE: 1.01
UROBILINOGEN URINE: NORMAL

## 2020-10-28 ENCOUNTER — EMERGENCY (EMERGENCY)
Facility: HOSPITAL | Age: 50
LOS: 1 days | Discharge: HOME | End: 2020-10-28
Attending: EMERGENCY MEDICINE | Admitting: EMERGENCY MEDICINE
Payer: MEDICAID

## 2020-10-28 VITALS
RESPIRATION RATE: 16 BRPM | HEART RATE: 113 BPM | DIASTOLIC BLOOD PRESSURE: 68 MMHG | HEIGHT: 65 IN | TEMPERATURE: 97 F | OXYGEN SATURATION: 98 % | SYSTOLIC BLOOD PRESSURE: 173 MMHG

## 2020-10-28 VITALS
RESPIRATION RATE: 20 BRPM | TEMPERATURE: 98 F | HEART RATE: 87 BPM | SYSTOLIC BLOOD PRESSURE: 163 MMHG | DIASTOLIC BLOOD PRESSURE: 63 MMHG | OXYGEN SATURATION: 100 %

## 2020-10-28 DIAGNOSIS — Z90.79 ACQUIRED ABSENCE OF OTHER GENITAL ORGAN(S): Chronic | ICD-10-CM

## 2020-10-28 DIAGNOSIS — Z90.49 ACQUIRED ABSENCE OF OTHER SPECIFIED PARTS OF DIGESTIVE TRACT: Chronic | ICD-10-CM

## 2020-10-28 DIAGNOSIS — Z90.09 ACQUIRED ABSENCE OF OTHER PART OF HEAD AND NECK: Chronic | ICD-10-CM

## 2020-10-28 LAB
ALBUMIN SERPL ELPH-MCNC: 4.5 G/DL — SIGNIFICANT CHANGE UP (ref 3.3–5)
ALP SERPL-CCNC: 83 U/L — SIGNIFICANT CHANGE UP (ref 40–120)
ALT FLD-CCNC: 13 U/L — SIGNIFICANT CHANGE UP (ref 4–33)
ANION GAP SERPL CALC-SCNC: 15 MMO/L — HIGH (ref 7–14)
AST SERPL-CCNC: 12 U/L — SIGNIFICANT CHANGE UP (ref 4–32)
BASOPHILS # BLD AUTO: 0.03 K/UL — SIGNIFICANT CHANGE UP (ref 0–0.2)
BASOPHILS NFR BLD AUTO: 0.3 % — SIGNIFICANT CHANGE UP (ref 0–2)
BILIRUB SERPL-MCNC: 0.3 MG/DL — SIGNIFICANT CHANGE UP (ref 0.2–1.2)
BUN SERPL-MCNC: 14 MG/DL — SIGNIFICANT CHANGE UP (ref 7–23)
CALCIUM SERPL-MCNC: 9.3 MG/DL — SIGNIFICANT CHANGE UP (ref 8.4–10.5)
CHLORIDE SERPL-SCNC: 101 MMOL/L — SIGNIFICANT CHANGE UP (ref 98–107)
CO2 SERPL-SCNC: 22 MMOL/L — SIGNIFICANT CHANGE UP (ref 22–31)
CREAT SERPL-MCNC: 1.13 MG/DL — SIGNIFICANT CHANGE UP (ref 0.5–1.3)
EOSINOPHIL # BLD AUTO: 0.12 K/UL — SIGNIFICANT CHANGE UP (ref 0–0.5)
EOSINOPHIL NFR BLD AUTO: 1.2 % — SIGNIFICANT CHANGE UP (ref 0–6)
GLUCOSE SERPL-MCNC: 235 MG/DL — HIGH (ref 70–99)
HCG SERPL-ACNC: < 5 MIU/ML — SIGNIFICANT CHANGE UP
HCT VFR BLD CALC: 37.5 % — SIGNIFICANT CHANGE UP (ref 34.5–45)
HGB BLD-MCNC: 12.2 G/DL — SIGNIFICANT CHANGE UP (ref 11.5–15.5)
IMM GRANULOCYTES NFR BLD AUTO: 0.7 % — SIGNIFICANT CHANGE UP (ref 0–1.5)
LYMPHOCYTES # BLD AUTO: 2.35 K/UL — SIGNIFICANT CHANGE UP (ref 1–3.3)
LYMPHOCYTES # BLD AUTO: 24.2 % — SIGNIFICANT CHANGE UP (ref 13–44)
MCHC RBC-ENTMCNC: 28 PG — SIGNIFICANT CHANGE UP (ref 27–34)
MCHC RBC-ENTMCNC: 32.5 % — SIGNIFICANT CHANGE UP (ref 32–36)
MCV RBC AUTO: 86 FL — SIGNIFICANT CHANGE UP (ref 80–100)
MONOCYTES # BLD AUTO: 0.44 K/UL — SIGNIFICANT CHANGE UP (ref 0–0.9)
MONOCYTES NFR BLD AUTO: 4.5 % — SIGNIFICANT CHANGE UP (ref 2–14)
NEUTROPHILS # BLD AUTO: 6.72 K/UL — SIGNIFICANT CHANGE UP (ref 1.8–7.4)
NEUTROPHILS NFR BLD AUTO: 69.1 % — SIGNIFICANT CHANGE UP (ref 43–77)
NRBC # FLD: 0 K/UL — SIGNIFICANT CHANGE UP (ref 0–0)
PLATELET # BLD AUTO: 252 K/UL — SIGNIFICANT CHANGE UP (ref 150–400)
PMV BLD: 12.1 FL — SIGNIFICANT CHANGE UP (ref 7–13)
POTASSIUM SERPL-MCNC: 4 MMOL/L — SIGNIFICANT CHANGE UP (ref 3.5–5.3)
POTASSIUM SERPL-SCNC: 4 MMOL/L — SIGNIFICANT CHANGE UP (ref 3.5–5.3)
PROT SERPL-MCNC: 7.6 G/DL — SIGNIFICANT CHANGE UP (ref 6–8.3)
RBC # BLD: 4.36 M/UL — SIGNIFICANT CHANGE UP (ref 3.8–5.2)
RBC # FLD: 13.2 % — SIGNIFICANT CHANGE UP (ref 10.3–14.5)
SODIUM SERPL-SCNC: 138 MMOL/L — SIGNIFICANT CHANGE UP (ref 135–145)
WBC # BLD: 9.73 K/UL — SIGNIFICANT CHANGE UP (ref 3.8–10.5)
WBC # FLD AUTO: 9.73 K/UL — SIGNIFICANT CHANGE UP (ref 3.8–10.5)

## 2020-10-28 PROCEDURE — 70496 CT ANGIOGRAPHY HEAD: CPT | Mod: 26

## 2020-10-28 PROCEDURE — 99284 EMERGENCY DEPT VISIT MOD MDM: CPT

## 2020-10-28 PROCEDURE — 70498 CT ANGIOGRAPHY NECK: CPT | Mod: 26

## 2020-10-28 RX ORDER — METOCLOPRAMIDE HCL 10 MG
10 TABLET ORAL ONCE
Refills: 0 | Status: COMPLETED | OUTPATIENT
Start: 2020-10-28 | End: 2020-10-28

## 2020-10-28 RX ORDER — MECLIZINE HCL 12.5 MG
1 TABLET ORAL
Qty: 12 | Refills: 0
Start: 2020-10-28 | End: 2020-10-31

## 2020-10-28 RX ORDER — SODIUM CHLORIDE 9 MG/ML
1000 INJECTION INTRAMUSCULAR; INTRAVENOUS; SUBCUTANEOUS ONCE
Refills: 0 | Status: COMPLETED | OUTPATIENT
Start: 2020-10-28 | End: 2020-10-28

## 2020-10-28 RX ORDER — MECLIZINE HCL 12.5 MG
25 TABLET ORAL ONCE
Refills: 0 | Status: COMPLETED | OUTPATIENT
Start: 2020-10-28 | End: 2020-10-28

## 2020-10-28 RX ORDER — ACETAMINOPHEN 500 MG
975 TABLET ORAL ONCE
Refills: 0 | Status: COMPLETED | OUTPATIENT
Start: 2020-10-28 | End: 2020-10-28

## 2020-10-28 RX ADMIN — Medication 975 MILLIGRAM(S): at 11:21

## 2020-10-28 RX ADMIN — SODIUM CHLORIDE 1000 MILLILITER(S): 9 INJECTION INTRAMUSCULAR; INTRAVENOUS; SUBCUTANEOUS at 14:09

## 2020-10-28 RX ADMIN — Medication 25 MILLIGRAM(S): at 11:20

## 2020-10-28 NOTE — ED ADULT NURSE NOTE - NSIMPLEMENTINTERV_GEN_ALL_ED
Implemented All Universal Safety Interventions:  [place] to call system. Call bell, personal items and telephone within reach. Instruct patient to call for assistance. Room bathroom lighting operational. Non-slip footwear when patient is off stretcher. Physically safe environment: no spills, clutter or unnecessary equipment. Stretcher in lowest position, wheels locked, appropriate side rails in place.

## 2020-10-28 NOTE — ED PROVIDER NOTE - PATIENT PORTAL LINK FT
You can access the FollowMyHealth Patient Portal offered by [facility] by registering at the following website: [email or web address]. By joining [employer]’s FollowMyHealth portal, you will also be able to view your health information using other applications (apps) compatible with our system.

## 2020-10-28 NOTE — ED ADULT NURSE NOTE - OBJECTIVE STATEMENT
Pt presents to room 28, A&Ox3, ambulatory at baseline without assistance, pmhx htn, dm, here for evaluation of blurry vision, elevated blood pressure, right sided headache radiating into the right arm x 3 days. denies any falls or trauma. denies any chest pain, sob, cough, fevers, or sick contacts. denies any focal deficits. Pt presents to room 28, A&Ox3, ambulatory at baseline without assistance, pmhx htn, dm, here for evaluation of blurry vision, elevated blood pressure, right sided headache radiating into the right arm x 3 days. denies any falls or trauma. denies any chest pain, sob, cough, fevers, or sick contacts. denies any focal deficits. IV established in right ac with a 20g, labs drawn and sent, call bell in reach, side rails up, bed in locked position, md evaluation in progress, pt on telemetry-NSR @ 72 noted, will continue to monitor.

## 2020-10-28 NOTE — ED PROVIDER NOTE - ATTENDING CONTRIBUTION TO CARE
I performed a face-to-face evaluation of the patient and performed a history and physical examination. I agree with the history and physical examination.    3 days R occipital HA, R neck pain, dizziness (constant). Feels off-balance. DDx: migraine variant, CVA. Labs, EKG, meclizine, CT brain.

## 2020-10-28 NOTE — ED PROVIDER NOTE - OBJECTIVE STATEMENT
51 y/o female with pmhx of DM, HTN, GERD, thyroid cancer s/p thyroidectomy, presents to ED c/o right sided neck pain associated with headache and dizziness x 3 days. Pt states she first developed atraumatic "sharp" right sided posterior neck pain that radiates down her right arm and into her right occipital head. Describes dizziness (denies room spinning) states she feels off balance like she is going to fall when walking. Not positional. States she had a brief episode of b/l blurry vision yesterday that self resolved. History of neck pain in the past however states she now has these new features. Has not tried pain medication yet. No fever, chills, cp, sob, abd pain, n/v, weakness, numbness, tingling, facial droop.

## 2020-10-28 NOTE — ED PROVIDER NOTE - PROGRESS NOTE DETAILS
PA Gerasimou- cta negative. pt ambulatory, feeling much better, states symptoms resolved. plan to dc home w/ meclizine and f/u with neuro outpt

## 2020-10-28 NOTE — ED PROVIDER NOTE - CLINICAL SUMMARY MEDICAL DECISION MAKING FREE TEXT BOX
Claire: 3 days R occipital HA, R neck pain, dizziness (constant). Feels off-balance. DDx: migraine variant, CVA. Labs, EKG, meclizine, CT brain.

## 2020-10-28 NOTE — ED ADULT TRIAGE NOTE - CHIEF COMPLAINT QUOTE
Pt. c/o right sided headache radiating down right arm, dizziness and blurry vision x 3 days. Denies cp, sob, n/v. h/o HTN, states she started a new BP medication on Saturday. BP has been elevated since.

## 2020-10-28 NOTE — ED PROVIDER NOTE - NSFOLLOWUPINSTRUCTIONS_ED_ALL_ED_FT
Follow up with your primary care provider within 48 hours, take copy of results    Follow up with Neurology this week- referral list given    Take Meclizine 25mg every 8 hours as needed for dizziness- CAUTION drowsiness, do not drive.  Take Tylenol 650mg every 6-8 hours as needed for headache      Worsening, continued or new concerning symptoms, weakness, numbness, slurred speech, return to the emergency department.

## 2020-10-31 ENCOUNTER — OUTPATIENT (OUTPATIENT)
Dept: OUTPATIENT SERVICES | Facility: HOSPITAL | Age: 50
LOS: 1 days | End: 2020-10-31

## 2020-10-31 DIAGNOSIS — Z90.09 ACQUIRED ABSENCE OF OTHER PART OF HEAD AND NECK: Chronic | ICD-10-CM

## 2020-10-31 DIAGNOSIS — Z90.49 ACQUIRED ABSENCE OF OTHER SPECIFIED PARTS OF DIGESTIVE TRACT: Chronic | ICD-10-CM

## 2020-10-31 DIAGNOSIS — Z90.79 ACQUIRED ABSENCE OF OTHER GENITAL ORGAN(S): Chronic | ICD-10-CM

## 2020-10-31 DIAGNOSIS — Z00.8 ENCOUNTER FOR OTHER GENERAL EXAMINATION: ICD-10-CM

## 2020-11-02 ENCOUNTER — APPOINTMENT (OUTPATIENT)
Dept: ULTRASOUND IMAGING | Facility: CLINIC | Age: 50
End: 2020-11-02
Payer: MEDICAID

## 2020-11-02 ENCOUNTER — RESULT REVIEW (OUTPATIENT)
Age: 50
End: 2020-11-02

## 2020-11-02 ENCOUNTER — OUTPATIENT (OUTPATIENT)
Dept: OUTPATIENT SERVICES | Facility: HOSPITAL | Age: 50
LOS: 1 days | End: 2020-11-02
Payer: MEDICAID

## 2020-11-02 DIAGNOSIS — Z13.30 ENCOUNTER FOR SCREENING EXAMINATION FOR MENTAL HEALTH AND BEHAVIORAL DISORDERS, UNSPECIFIED: ICD-10-CM

## 2020-11-02 DIAGNOSIS — Z00.8 ENCOUNTER FOR OTHER GENERAL EXAMINATION: ICD-10-CM

## 2020-11-02 DIAGNOSIS — Z90.09 ACQUIRED ABSENCE OF OTHER PART OF HEAD AND NECK: Chronic | ICD-10-CM

## 2020-11-02 DIAGNOSIS — Z90.49 ACQUIRED ABSENCE OF OTHER SPECIFIED PARTS OF DIGESTIVE TRACT: Chronic | ICD-10-CM

## 2020-11-02 DIAGNOSIS — Z90.79 ACQUIRED ABSENCE OF OTHER GENITAL ORGAN(S): Chronic | ICD-10-CM

## 2020-11-02 PROCEDURE — 76770 US EXAM ABDO BACK WALL COMP: CPT | Mod: 26

## 2020-11-02 PROCEDURE — 76770 US EXAM ABDO BACK WALL COMP: CPT

## 2020-11-03 ENCOUNTER — APPOINTMENT (OUTPATIENT)
Dept: CARDIOLOGY | Facility: CLINIC | Age: 50
End: 2020-11-03
Payer: MEDICAID

## 2020-11-03 ENCOUNTER — NON-APPOINTMENT (OUTPATIENT)
Age: 50
End: 2020-11-03

## 2020-11-03 VITALS — SYSTOLIC BLOOD PRESSURE: 160 MMHG | DIASTOLIC BLOOD PRESSURE: 80 MMHG

## 2020-11-03 VITALS
DIASTOLIC BLOOD PRESSURE: 82 MMHG | OXYGEN SATURATION: 98 % | BODY MASS INDEX: 32.78 KG/M2 | SYSTOLIC BLOOD PRESSURE: 177 MMHG | TEMPERATURE: 97.4 F | WEIGHT: 197 LBS | HEART RATE: 96 BPM

## 2020-11-03 DIAGNOSIS — R01.1 CARDIAC MURMUR, UNSPECIFIED: ICD-10-CM

## 2020-11-03 PROCEDURE — 99204 OFFICE O/P NEW MOD 45 MIN: CPT

## 2020-11-03 PROCEDURE — 99072 ADDL SUPL MATRL&STAF TM PHE: CPT

## 2020-11-03 PROCEDURE — 93000 ELECTROCARDIOGRAM COMPLETE: CPT

## 2020-11-03 RX ORDER — METFORMIN HYDROCHLORIDE 850 MG/1
1 TABLET ORAL
Qty: 0 | Refills: 0 | DISCHARGE

## 2020-11-03 RX ORDER — NIFEDIPINE 60 MG/1
60 TABLET, EXTENDED RELEASE ORAL DAILY
Qty: 30 | Refills: 0 | Status: DISCONTINUED | COMMUNITY
Start: 2020-10-23 | End: 2020-11-03

## 2020-11-03 NOTE — REVIEW OF SYSTEMS
[Headache] : headache [Feeling Fatigued] : feeling fatigued [Chest Pain] : chest pain [Nausea] : nausea [Heartburn] : heartburn [Depression] : depression [Anxiety] : anxiety [Negative] : Heme/Lymph

## 2020-11-03 NOTE — PHYSICAL EXAM
[General Appearance - Well Developed] : well developed [Normal Appearance] : normal appearance [Well Groomed] : well groomed [General Appearance - Well Nourished] : well nourished [No Deformities] : no deformities [General Appearance - In No Acute Distress] : no acute distress [Normal Conjunctiva] : the conjunctiva exhibited no abnormalities [Eyelids - No Xanthelasma] : the eyelids demonstrated no xanthelasmas [Normal Oral Mucosa] : normal oral mucosa [No Oral Pallor] : no oral pallor [No Oral Cyanosis] : no oral cyanosis [Normal Jugular Venous A Waves Present] : normal jugular venous A waves present [Normal Jugular Venous V Waves Present] : normal jugular venous V waves present [No Jugular Venous Sharma A Waves] : no jugular venous sharma A waves [Heart Rate And Rhythm] : heart rate and rhythm were normal [Heart Sounds] : normal S1 and S2 [Respiration, Rhythm And Depth] : normal respiratory rhythm and effort [Exaggerated Use Of Accessory Muscles For Inspiration] : no accessory muscle use [Auscultation Breath Sounds / Voice Sounds] : lungs were clear to auscultation bilaterally [Abdomen Soft] : soft [Abdomen Tenderness] : non-tender [Abdomen Mass (___ Cm)] : no abdominal mass palpated [Abnormal Walk] : normal gait [Gait - Sufficient For Exercise Testing] : the gait was sufficient for exercise testing [Nail Clubbing] : no clubbing of the fingernails [Cyanosis, Localized] : no localized cyanosis [Petechial Hemorrhages (___cm)] : no petechial hemorrhages [Skin Color & Pigmentation] : normal skin color and pigmentation [] : no rash [No Venous Stasis] : no venous stasis [Skin Lesions] : no skin lesions [No Skin Ulcers] : no skin ulcer [No Xanthoma] : no  xanthoma was observed [Oriented To Time, Place, And Person] : oriented to person, place, and time [Affect] : the affect was normal [Mood] : the mood was normal [No Anxiety] : not feeling anxious [FreeTextEntry1] : 2/6 early basal systolic murmur

## 2020-11-03 NOTE — HISTORY OF PRESENT ILLNESS
[FreeTextEntry1] : 50-year old female referred for cardiac evaluation of atypical chest pain and a systolic murmur.  She has no prior history of heart disease, but has a longstanding history of hypertension and diabetes.  She was maintained on losartan and hydrochlorothiazide for a number of years until she was hospitalized in September with hyponatremia and a urinary tract infection.  The hydrochlorothiazide was discontinued and she has been on a variety of antihypertensive drugs since including labetalol, hydralazine, diltiazem, amlodipine, and most recently nifedipine.  She reports side effects from all these medications and would like to restart her hydrochlorothiazide.\par \par She has a history of heartburn associated with esophageal reflux and believes that calcium channel blockers have worsened it.  She is also experienced some exertional chest pressure which she attributes to taking labetalol.

## 2020-11-03 NOTE — DISCUSSION/SUMMARY
[FreeTextEntry1] : In summary, Ms. Holland is a 50-year-old female with a history of hypertension and diabetes who complains of side effects from multiple antihypertensive drugs.  She has had some exertional chest pressure recently and has a basal systolic murmur.  Her exam shows elevated systolic pressure, clear lungs, a basal systolic murmur, trace edema, and normal peripheral pulses.  An EKG is within normal limits.\par \par I told her we might consider trying her back on hydrochlorothiazide at a low dose if nothing else works, but we had not exhausted all the alternatives.  She will next try carvedilol 12.5 twice a day to see if it is tolerable.  She is scheduled for an echo later this month and I changed it to a stress echo so she can also be evaluated for ischemic heart disease.  Her blood pressure will be reevaluated at that time.

## 2020-11-17 ENCOUNTER — APPOINTMENT (OUTPATIENT)
Dept: NEUROLOGY | Facility: CLINIC | Age: 50
End: 2020-11-17
Payer: MEDICAID

## 2020-11-17 ENCOUNTER — TRANSCRIPTION ENCOUNTER (OUTPATIENT)
Age: 50
End: 2020-11-17

## 2020-11-17 VITALS
HEART RATE: 76 BPM | WEIGHT: 195 LBS | HEIGHT: 65 IN | DIASTOLIC BLOOD PRESSURE: 80 MMHG | SYSTOLIC BLOOD PRESSURE: 153 MMHG | BODY MASS INDEX: 32.49 KG/M2

## 2020-11-17 VITALS — TEMPERATURE: 97.2 F

## 2020-11-17 DIAGNOSIS — M54.2 CERVICALGIA: ICD-10-CM

## 2020-11-17 PROCEDURE — 99204 OFFICE O/P NEW MOD 45 MIN: CPT

## 2020-11-17 PROCEDURE — 99072 ADDL SUPL MATRL&STAF TM PHE: CPT

## 2020-11-17 RX ORDER — CHROMIUM 200 MCG
TABLET ORAL
Refills: 0 | Status: ACTIVE | COMMUNITY

## 2020-11-17 NOTE — ASSESSMENT
[FreeTextEntry1] : Assessment/Plan:\par Right sided cervical radiculopathy/cervicalgia:-\par [] will refer to physical therapy\par [] will prescribe flexeril 5-10 mg as needed BID\par f/u 2 months\par \par The above plan was discussed with GUS PARDO in great detail. GUS PARDO was provided education and counselling on current diagnosis/symptoms, treatment options and potential side effects of prescribed therapy/therapies. She was advised to call our clinic at [phone] for any new or worsening symptoms, or with any questions or concerns. GUS PARDO expressed understanding and all her questions were answered.\par

## 2020-11-17 NOTE — DATA REVIEWED
[de-identified] :  10/28/2020:-\par CT head which showed no acute intracranial pathology and CTA H/N with no stenosis or occlusions.

## 2020-11-17 NOTE — HISTORY OF PRESENT ILLNESS
[FreeTextEntry1] : Christopher has a hx of HTN, DM, presents for evaluation of headaches and neck pain. \par \par She was seen at [facility] ED 10/28/2020 for 3 days of HA and dizziness. She had CT head which showed no acute intracranial pathology and CTA H/N with no stenosis or occlusions. \par \par She describes right sided headaches, "tightness" and sharp pains over the occipital area and radiates over her right shoulder. There is slight tenderness over right occipital area. The pain is worse when she turns her head to the right. She first had this pain 3 months ago and then when she presented to the ER. She did feel lightheaded and nauseous. No vision changes. 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 She believes her high blood pressure causes her headaches.She reports there has been a recent change in bp medications. \par \par She denies any significant hx of migraines/Headaches.

## 2020-11-20 ENCOUNTER — NON-APPOINTMENT (OUTPATIENT)
Age: 50
End: 2020-11-20

## 2020-11-23 ENCOUNTER — APPOINTMENT (OUTPATIENT)
Dept: CV DIAGNOSITCS | Facility: HOSPITAL | Age: 50
End: 2020-11-23

## 2020-11-23 ENCOUNTER — APPOINTMENT (OUTPATIENT)
Dept: INTERNAL MEDICINE | Facility: CLINIC | Age: 50
End: 2020-11-23
Payer: MEDICAID

## 2020-11-23 VITALS
OXYGEN SATURATION: 98 % | SYSTOLIC BLOOD PRESSURE: 110 MMHG | BODY MASS INDEX: 32.99 KG/M2 | WEIGHT: 198 LBS | DIASTOLIC BLOOD PRESSURE: 70 MMHG | HEIGHT: 65 IN | HEART RATE: 78 BPM

## 2020-11-23 VITALS — SYSTOLIC BLOOD PRESSURE: 120 MMHG | DIASTOLIC BLOOD PRESSURE: 80 MMHG

## 2020-11-23 DIAGNOSIS — Z23 ENCOUNTER FOR IMMUNIZATION: ICD-10-CM

## 2020-11-23 PROBLEM — I10 ESSENTIAL (PRIMARY) HYPERTENSION: Chronic | Status: ACTIVE | Noted: 2020-09-17

## 2020-11-23 PROBLEM — E89.0 POSTPROCEDURAL HYPOTHYROIDISM: Chronic | Status: ACTIVE | Noted: 2020-09-17

## 2020-11-23 PROBLEM — E11.9 TYPE 2 DIABETES MELLITUS WITHOUT COMPLICATIONS: Chronic | Status: ACTIVE | Noted: 2020-09-17

## 2020-11-23 PROCEDURE — 99213 OFFICE O/P EST LOW 20 MIN: CPT

## 2020-11-23 NOTE — REVIEW OF SYSTEMS
[Fever] : no fever [Chills] : no chills [Chest Pain] : no chest pain [Shortness Of Breath] : no shortness of breath [Abdominal Pain] : no abdominal pain [Joint Pain] : joint pain [Muscle Pain] : muscle pain [Back Pain] : back pain [FreeTextEntry9] : Muscle tightness in back

## 2020-11-23 NOTE — PHYSICAL EXAM
[Normal Sclera/Conjunctiva] : normal sclera/conjunctiva [No Lymphadenopathy] : no lymphadenopathy [Supple] : supple [Soft] : abdomen soft [Non Tender] : non-tender [Non-distended] : non-distended [No Masses] : no abdominal mass palpated [Normal Supraclavicular Nodes] : no supraclavicular lymphadenopathy [No CVA Tenderness] : no CVA  tenderness [No Spinal Tenderness] : no spinal tenderness [Coordination Grossly Intact] : coordination grossly intact [Normal] : affect was normal and insight and judgment were intact [de-identified] : possibly very trace edema bilaterally

## 2020-11-23 NOTE — HISTORY OF PRESENT ILLNESS
[FreeTextEntry1] : Follow up of hypertension [de-identified] : 50F with uncontrolled hypertension and uncontrolled type 2 diabetes who has had ER visits and visits with multiple physicians for control of blood pressure due to experiencing adverse effects from most classes of antihypertensive medications presents for follow up appointment. \par \par Since her first visit with me she was changed from nifedipine to carvedilol 12.5 BID; now she is on 25 mg BID. She is pending a stress echo later today due to episodes of chest pain on exertion she experienced when she was taking labetalol. Today's office BP is good. Around 3 pm it might be 156/84. Readings vary - sometimes 145/81. Tolerating the medication pretty well.

## 2020-11-23 NOTE — COUNSELING
[Structured Weight Management Program suggested:] : Structured weight management program suggested [Good understanding] : Patient has a good understanding of disease, goals and obesity follow-up plan [FreeTextEntry1] : Weight watchers or My Fitness Pal type applications or Obesity management referral

## 2020-11-23 NOTE — ASSESSMENT
[FreeTextEntry1] : 50F with HTN, T2DM presents to clinic today for follow up of blood pressures which seem to be better controlled today on her current medication regimen. \par \par 1. Hypertension\par -on losartan and carvedilol 25 BID and blood pressure 115/78 in right arm 120/80 in left arm today. \par \par 2. T2DM\par -a1c: will be repeated today\par -was following with endocrinologist and currently on: basaglar 18 units, glipizide 10 mg, and janumet  mg bid. she should f/u with me every 3 months at minimum so we can get DM under control. \par \par 3. Chest pain\par -pending stress echo in January; no active chest pain today\par \par 4. HCM\par -needs gyn f/u for pap smear; needs new gyn and will give information for practice upstairs. \par -mammogram, colon cancer screening - referrals given today and since she does not want colonoscopy gave FIT test\par -flu vaccine, pneumovax, tdap and shingrix are due however patient declined today. But will continue to discuss with patient at next visit. \par \par 5. ASCVD risk; has evidence of atherosclerosis in b/l carotid aa with out flow limiting lesions and has uncontrolled t2dm; will check lipid profile today and i think she will likely need statin.

## 2020-11-24 ENCOUNTER — TRANSCRIPTION ENCOUNTER (OUTPATIENT)
Age: 50
End: 2020-11-24

## 2020-11-24 LAB
ALBUMIN SERPL ELPH-MCNC: 4.8 G/DL
ALP BLD-CCNC: 82 U/L
ALT SERPL-CCNC: 10 U/L
ANION GAP SERPL CALC-SCNC: 13 MMOL/L
AST SERPL-CCNC: 10 U/L
BASOPHILS # BLD AUTO: 0.04 K/UL
BASOPHILS NFR BLD AUTO: 0.4 %
BILIRUB SERPL-MCNC: 0.3 MG/DL
BUN SERPL-MCNC: 18 MG/DL
CALCIUM SERPL-MCNC: 10.3 MG/DL
CHLORIDE SERPL-SCNC: 101 MMOL/L
CHOLEST SERPL-MCNC: 170 MG/DL
CO2 SERPL-SCNC: 23 MMOL/L
CREAT SERPL-MCNC: 1.22 MG/DL
EOSINOPHIL # BLD AUTO: 0.18 K/UL
EOSINOPHIL NFR BLD AUTO: 1.8 %
ESTIMATED AVERAGE GLUCOSE: 189 MG/DL
GLUCOSE SERPL-MCNC: 174 MG/DL
HBA1C MFR BLD HPLC: 8.2 %
HCT VFR BLD CALC: 40 %
HDLC SERPL-MCNC: 53 MG/DL
HGB BLD-MCNC: 12.4 G/DL
IMM GRANULOCYTES NFR BLD AUTO: 0.6 %
LDLC SERPL CALC-MCNC: 84 MG/DL
LYMPHOCYTES # BLD AUTO: 3.27 K/UL
LYMPHOCYTES NFR BLD AUTO: 32.7 %
MAN DIFF?: NORMAL
MCHC RBC-ENTMCNC: 28.2 PG
MCHC RBC-ENTMCNC: 31 GM/DL
MCV RBC AUTO: 90.9 FL
MONOCYTES # BLD AUTO: 0.44 K/UL
MONOCYTES NFR BLD AUTO: 4.4 %
NEUTROPHILS # BLD AUTO: 6 K/UL
NEUTROPHILS NFR BLD AUTO: 60.1 %
NONHDLC SERPL-MCNC: 117 MG/DL
PLATELET # BLD AUTO: 258 K/UL
POTASSIUM SERPL-SCNC: 5.3 MMOL/L
PROT SERPL-MCNC: 7.5 G/DL
RBC # BLD: 4.4 M/UL
RBC # FLD: 13.7 %
SODIUM SERPL-SCNC: 137 MMOL/L
TRIGL SERPL-MCNC: 167 MG/DL
TSH SERPL-ACNC: 0.34 UIU/ML
WBC # FLD AUTO: 9.99 K/UL

## 2020-12-07 ENCOUNTER — NON-APPOINTMENT (OUTPATIENT)
Age: 50
End: 2020-12-07

## 2020-12-07 ENCOUNTER — RX RENEWAL (OUTPATIENT)
Age: 50
End: 2020-12-07

## 2021-01-18 ENCOUNTER — APPOINTMENT (OUTPATIENT)
Dept: CARDIOLOGY | Facility: CLINIC | Age: 51
End: 2021-01-18

## 2021-01-19 ENCOUNTER — TRANSCRIPTION ENCOUNTER (OUTPATIENT)
Age: 51
End: 2021-01-19

## 2021-02-08 ENCOUNTER — TRANSCRIPTION ENCOUNTER (OUTPATIENT)
Age: 51
End: 2021-02-08

## 2021-03-26 ENCOUNTER — TRANSCRIPTION ENCOUNTER (OUTPATIENT)
Age: 51
End: 2021-03-26

## 2021-04-05 ENCOUNTER — TRANSCRIPTION ENCOUNTER (OUTPATIENT)
Age: 51
End: 2021-04-05

## 2021-04-09 ENCOUNTER — NON-APPOINTMENT (OUTPATIENT)
Age: 51
End: 2021-04-09

## 2021-04-12 ENCOUNTER — RESULT REVIEW (OUTPATIENT)
Age: 51
End: 2021-04-12

## 2021-04-12 ENCOUNTER — APPOINTMENT (OUTPATIENT)
Dept: INTERNAL MEDICINE | Facility: CLINIC | Age: 51
End: 2021-04-12
Payer: MEDICAID

## 2021-04-12 VITALS
OXYGEN SATURATION: 99 % | DIASTOLIC BLOOD PRESSURE: 90 MMHG | WEIGHT: 201 LBS | BODY MASS INDEX: 33.49 KG/M2 | HEART RATE: 74 BPM | HEIGHT: 65 IN | SYSTOLIC BLOOD PRESSURE: 160 MMHG

## 2021-04-12 DIAGNOSIS — N83.202 UNSPECIFIED OVARIAN CYST, LEFT SIDE: ICD-10-CM

## 2021-04-12 DIAGNOSIS — R10.9 UNSPECIFIED ABDOMINAL PAIN: ICD-10-CM

## 2021-04-12 PROCEDURE — 99072 ADDL SUPL MATRL&STAF TM PHE: CPT

## 2021-04-12 PROCEDURE — 99214 OFFICE O/P EST MOD 30 MIN: CPT

## 2021-04-12 NOTE — HISTORY OF PRESENT ILLNESS
[FreeTextEntry1] : 50F with HTN, T2DM presents to clinic today for follow up of blood pressures and T2DM [de-identified] : HTN: does not check BP at home and is taking medication as prescribed but has had some weight gain. \par \par T2DM: , eating more and working less as hours were cut due to COVID. \par \par Obesity: no walking since December. hours were cut recently. \par \par Took COVID (Moderna) shot on March 27. She has pain on her left flank and pelvis. No fever, no dysuria. No change in bowel habits. She has been googling and is now concerned about ovarian cancer. She has no change in bowel habits. \par \par Mammogram scheduled by patient; she reports she needs ultrasound as well.

## 2021-04-12 NOTE — PHYSICAL EXAM
[Normal Voice/Communication] : normal voice/communication [No Edema] : there was no peripheral edema [Soft] : abdomen soft [Non Tender] : non-tender [Non-distended] : non-distended [No Rash] : no rash [Coordination Grossly Intact] : coordination grossly intact [No Focal Deficits] : no focal deficits [Normal Gait] : normal gait [Normal] : affect was normal and insight and judgment were intact [de-identified] : possible left sided paraspinal tenderness

## 2021-04-12 NOTE — REVIEW OF SYSTEMS
[Frequency] : frequency [Negative] : Musculoskeletal [Fever] : no fever [Chills] : no chills [Chest Pain] : no chest pain [Shortness Of Breath] : no shortness of breath [Dysuria] : no dysuria [FreeTextEntry5] : chest pain resolved

## 2021-04-12 NOTE — ASSESSMENT
[FreeTextEntry1] : 50F with HTN, T2DM presents to clinic today for follow up of blood pressures and T2DM\par \par 1. Hypertension - uncontrolled\par -on losartan and carvedilol 25 BID \par -adding amlodipine 5 mg \par \par 2. T2DM\par -a1c (8.2% in November): will be repeated today\par -was following with endocrinologist and currently on: basaglar 18 units, glipizide 10 mg, and janumet  mg bid. she should f/u with me every 3 months at minimum \par -based on ACC/AHA guidelines should be on statin; we discussed risks and benefits and started atorvastatin 20 mg\par \par 3. Chest pain\par -pending stress echo and is going to f/u with Dr. Dahl\par \par 4. HCM\par -has GYN appt May 9\par -mammogram, colon cancer screening - referrals given today and since she does not want colonoscopy gave FIT test and patient has yet to complete and provided her with new test\par -flu vaccine, pneumovax, tdap and shingrix are due however patient declined\par -have had a few discussions with patients on COVID vaccine - she is due for second shot soon\par \par 5. Obesity\par -advised f/u with med weight management, regular exercise change in diet\par -discussed risks and benefits of obesity

## 2021-04-13 ENCOUNTER — TRANSCRIPTION ENCOUNTER (OUTPATIENT)
Age: 51
End: 2021-04-13

## 2021-04-13 LAB
ALBUMIN SERPL ELPH-MCNC: 4.6 G/DL
ALP BLD-CCNC: 81 U/L
ALT SERPL-CCNC: 14 U/L
ANION GAP SERPL CALC-SCNC: 12 MMOL/L
APPEARANCE: CLEAR
AST SERPL-CCNC: 12 U/L
BASOPHILS # BLD AUTO: 0.06 K/UL
BASOPHILS NFR BLD AUTO: 0.6 %
BILIRUB SERPL-MCNC: 0.4 MG/DL
BILIRUBIN URINE: NEGATIVE
BLOOD URINE: NEGATIVE
BUN SERPL-MCNC: 14 MG/DL
CALCIUM SERPL-MCNC: 9.6 MG/DL
CHLORIDE SERPL-SCNC: 97 MMOL/L
CHOLEST SERPL-MCNC: 173 MG/DL
CO2 SERPL-SCNC: 23 MMOL/L
COLOR: COLORLESS
CREAT SERPL-MCNC: 1.2 MG/DL
EOSINOPHIL # BLD AUTO: 0.3 K/UL
EOSINOPHIL NFR BLD AUTO: 2.9 %
ESTIMATED AVERAGE GLUCOSE: 206 MG/DL
FERRITIN SERPL-MCNC: 87 NG/ML
GLUCOSE QUALITATIVE U: NEGATIVE
GLUCOSE SERPL-MCNC: 158 MG/DL
HBA1C MFR BLD HPLC: 8.8 %
HCT VFR BLD CALC: 39 %
HDLC SERPL-MCNC: 50 MG/DL
HGB BLD-MCNC: 12.3 G/DL
IMM GRANULOCYTES NFR BLD AUTO: 0.5 %
IRON SATN MFR SERPL: 20 %
IRON SERPL-MCNC: 61 UG/DL
KETONES URINE: NEGATIVE
LDLC SERPL CALC-MCNC: 92 MG/DL
LEUKOCYTE ESTERASE URINE: NEGATIVE
LYMPHOCYTES # BLD AUTO: 3.75 K/UL
LYMPHOCYTES NFR BLD AUTO: 35.9 %
MAN DIFF?: NORMAL
MCHC RBC-ENTMCNC: 27.9 PG
MCHC RBC-ENTMCNC: 31.5 GM/DL
MCV RBC AUTO: 88.4 FL
MONOCYTES # BLD AUTO: 0.46 K/UL
MONOCYTES NFR BLD AUTO: 4.4 %
NEUTROPHILS # BLD AUTO: 5.84 K/UL
NEUTROPHILS NFR BLD AUTO: 55.7 %
NITRITE URINE: NEGATIVE
NONHDLC SERPL-MCNC: 123 MG/DL
PH URINE: 6.5
PLATELET # BLD AUTO: 268 K/UL
POTASSIUM SERPL-SCNC: 4.9 MMOL/L
PROT SERPL-MCNC: 7.4 G/DL
PROTEIN URINE: NEGATIVE
RBC # BLD: 4.41 M/UL
RBC # FLD: 13.7 %
SODIUM SERPL-SCNC: 133 MMOL/L
SPECIFIC GRAVITY URINE: 1.01
T4 FREE SERPL-MCNC: 2 NG/DL
TIBC SERPL-MCNC: 311 UG/DL
TRIGL SERPL-MCNC: 154 MG/DL
TSH SERPL-ACNC: 0.13 UIU/ML
UIBC SERPL-MCNC: 250 UG/DL
UROBILINOGEN URINE: NORMAL
WBC # FLD AUTO: 10.46 K/UL

## 2021-04-13 RX ORDER — AMLODIPINE BESYLATE 5 MG/1
5 TABLET ORAL
Qty: 90 | Refills: 3 | Status: DISCONTINUED | COMMUNITY
Start: 2021-04-12 | End: 2021-04-13

## 2021-05-07 ENCOUNTER — OUTPATIENT (OUTPATIENT)
Dept: OUTPATIENT SERVICES | Facility: HOSPITAL | Age: 51
LOS: 1 days | End: 2021-05-07

## 2021-05-07 DIAGNOSIS — Z00.8 ENCOUNTER FOR OTHER GENERAL EXAMINATION: ICD-10-CM

## 2021-05-07 DIAGNOSIS — Z90.79 ACQUIRED ABSENCE OF OTHER GENITAL ORGAN(S): Chronic | ICD-10-CM

## 2021-05-07 DIAGNOSIS — Z90.09 ACQUIRED ABSENCE OF OTHER PART OF HEAD AND NECK: Chronic | ICD-10-CM

## 2021-05-07 DIAGNOSIS — Z90.49 ACQUIRED ABSENCE OF OTHER SPECIFIED PARTS OF DIGESTIVE TRACT: Chronic | ICD-10-CM

## 2021-05-08 ENCOUNTER — APPOINTMENT (OUTPATIENT)
Dept: OBGYN | Facility: CLINIC | Age: 51
End: 2021-05-08

## 2021-06-08 ENCOUNTER — RESULT REVIEW (OUTPATIENT)
Age: 51
End: 2021-06-08

## 2021-06-08 ENCOUNTER — APPOINTMENT (OUTPATIENT)
Dept: ULTRASOUND IMAGING | Facility: CLINIC | Age: 51
End: 2021-06-08
Payer: COMMERCIAL

## 2021-06-08 ENCOUNTER — OUTPATIENT (OUTPATIENT)
Dept: OUTPATIENT SERVICES | Facility: HOSPITAL | Age: 51
LOS: 1 days | End: 2021-06-08
Payer: MEDICAID

## 2021-06-08 ENCOUNTER — APPOINTMENT (OUTPATIENT)
Dept: MAMMOGRAPHY | Facility: CLINIC | Age: 51
End: 2021-06-08
Payer: COMMERCIAL

## 2021-06-08 ENCOUNTER — TRANSCRIPTION ENCOUNTER (OUTPATIENT)
Age: 51
End: 2021-06-08

## 2021-06-08 DIAGNOSIS — Z00.8 ENCOUNTER FOR OTHER GENERAL EXAMINATION: ICD-10-CM

## 2021-06-08 DIAGNOSIS — Z90.79 ACQUIRED ABSENCE OF OTHER GENITAL ORGAN(S): Chronic | ICD-10-CM

## 2021-06-08 DIAGNOSIS — Z90.49 ACQUIRED ABSENCE OF OTHER SPECIFIED PARTS OF DIGESTIVE TRACT: Chronic | ICD-10-CM

## 2021-06-08 DIAGNOSIS — Z90.09 ACQUIRED ABSENCE OF OTHER PART OF HEAD AND NECK: Chronic | ICD-10-CM

## 2021-06-08 PROCEDURE — 76641 ULTRASOUND BREAST COMPLETE: CPT | Mod: 26,50

## 2021-06-08 PROCEDURE — 77067 SCR MAMMO BI INCL CAD: CPT | Mod: 26

## 2021-06-08 PROCEDURE — 77063 BREAST TOMOSYNTHESIS BI: CPT | Mod: 26

## 2021-06-08 PROCEDURE — 76857 US EXAM PELVIC LIMITED: CPT

## 2021-06-08 PROCEDURE — 76700 US EXAM ABDOM COMPLETE: CPT | Mod: 26

## 2021-06-08 PROCEDURE — 76857 US EXAM PELVIC LIMITED: CPT | Mod: 26

## 2021-06-08 PROCEDURE — 76641 ULTRASOUND BREAST COMPLETE: CPT

## 2021-06-08 PROCEDURE — 77067 SCR MAMMO BI INCL CAD: CPT

## 2021-06-08 PROCEDURE — 76830 TRANSVAGINAL US NON-OB: CPT | Mod: 26

## 2021-06-08 PROCEDURE — 76700 US EXAM ABDOM COMPLETE: CPT

## 2021-06-08 PROCEDURE — 77063 BREAST TOMOSYNTHESIS BI: CPT

## 2021-06-08 PROCEDURE — 76830 TRANSVAGINAL US NON-OB: CPT

## 2021-06-09 ENCOUNTER — TRANSCRIPTION ENCOUNTER (OUTPATIENT)
Age: 51
End: 2021-06-09

## 2021-06-09 ENCOUNTER — RESULT REVIEW (OUTPATIENT)
Age: 51
End: 2021-06-09

## 2021-06-10 ENCOUNTER — TRANSCRIPTION ENCOUNTER (OUTPATIENT)
Age: 51
End: 2021-06-10

## 2021-06-10 ENCOUNTER — RESULT REVIEW (OUTPATIENT)
Age: 51
End: 2021-06-10

## 2021-06-11 ENCOUNTER — TRANSCRIPTION ENCOUNTER (OUTPATIENT)
Age: 51
End: 2021-06-11

## 2021-06-14 ENCOUNTER — LABORATORY RESULT (OUTPATIENT)
Age: 51
End: 2021-06-14

## 2021-06-14 ENCOUNTER — APPOINTMENT (OUTPATIENT)
Dept: OBGYN | Facility: CLINIC | Age: 51
End: 2021-06-14
Payer: MEDICAID

## 2021-06-14 ENCOUNTER — OUTPATIENT (OUTPATIENT)
Dept: OUTPATIENT SERVICES | Facility: HOSPITAL | Age: 51
LOS: 1 days | End: 2021-06-14
Payer: MEDICAID

## 2021-06-14 VITALS
SYSTOLIC BLOOD PRESSURE: 138 MMHG | DIASTOLIC BLOOD PRESSURE: 80 MMHG | WEIGHT: 203 LBS | HEIGHT: 65 IN | BODY MASS INDEX: 33.82 KG/M2

## 2021-06-14 VITALS — TEMPERATURE: 97.1 F

## 2021-06-14 DIAGNOSIS — Z01.419 ENCOUNTER FOR GYNECOLOGICAL EXAMINATION (GENERAL) (ROUTINE) WITHOUT ABNORMAL FINDINGS: ICD-10-CM

## 2021-06-14 DIAGNOSIS — L81.9 DISORDER OF PIGMENTATION, UNSPECIFIED: ICD-10-CM

## 2021-06-14 DIAGNOSIS — Z01.419 ENCOUNTER FOR GYNECOLOGICAL EXAMINATION (GENERAL) (ROUTINE) W/OUT ABNORMAL FINDINGS: ICD-10-CM

## 2021-06-14 DIAGNOSIS — Z90.49 ACQUIRED ABSENCE OF OTHER SPECIFIED PARTS OF DIGESTIVE TRACT: Chronic | ICD-10-CM

## 2021-06-14 DIAGNOSIS — Z90.79 ACQUIRED ABSENCE OF OTHER GENITAL ORGAN(S): Chronic | ICD-10-CM

## 2021-06-14 DIAGNOSIS — N89.8 OTHER SPECIFIED NONINFLAMMATORY DISORDERS OF VAGINA: ICD-10-CM

## 2021-06-14 DIAGNOSIS — N76.0 ACUTE VAGINITIS: ICD-10-CM

## 2021-06-14 DIAGNOSIS — Z90.09 ACQUIRED ABSENCE OF OTHER PART OF HEAD AND NECK: Chronic | ICD-10-CM

## 2021-06-14 DIAGNOSIS — N83.292 OTHER OVARIAN CYST, LEFT SIDE: ICD-10-CM

## 2021-06-14 PROCEDURE — G0463: CPT | Mod: 25

## 2021-06-14 PROCEDURE — 87624 HPV HI-RISK TYP POOLED RSLT: CPT

## 2021-06-14 PROCEDURE — 87591 N.GONORRHOEAE DNA AMP PROB: CPT

## 2021-06-14 PROCEDURE — 99386 PREV VISIT NEW AGE 40-64: CPT | Mod: GC

## 2021-06-14 PROCEDURE — 87491 CHLMYD TRACH DNA AMP PROBE: CPT

## 2021-06-15 ENCOUNTER — TRANSCRIPTION ENCOUNTER (OUTPATIENT)
Age: 51
End: 2021-06-15

## 2021-06-15 ENCOUNTER — APPOINTMENT (OUTPATIENT)
Dept: MAMMOGRAPHY | Facility: CLINIC | Age: 51
End: 2021-06-15
Payer: COMMERCIAL

## 2021-06-15 ENCOUNTER — OUTPATIENT (OUTPATIENT)
Dept: OUTPATIENT SERVICES | Facility: HOSPITAL | Age: 51
LOS: 1 days | End: 2021-06-15
Payer: MEDICAID

## 2021-06-15 ENCOUNTER — RESULT REVIEW (OUTPATIENT)
Age: 51
End: 2021-06-15

## 2021-06-15 DIAGNOSIS — Z00.8 ENCOUNTER FOR OTHER GENERAL EXAMINATION: ICD-10-CM

## 2021-06-15 DIAGNOSIS — Z90.79 ACQUIRED ABSENCE OF OTHER GENITAL ORGAN(S): Chronic | ICD-10-CM

## 2021-06-15 DIAGNOSIS — Z90.49 ACQUIRED ABSENCE OF OTHER SPECIFIED PARTS OF DIGESTIVE TRACT: Chronic | ICD-10-CM

## 2021-06-15 DIAGNOSIS — Z90.09 ACQUIRED ABSENCE OF OTHER PART OF HEAD AND NECK: Chronic | ICD-10-CM

## 2021-06-15 LAB
C TRACH RRNA SPEC QL NAA+PROBE: SIGNIFICANT CHANGE UP
HPV HIGH+LOW RISK DNA PNL CVX: SIGNIFICANT CHANGE UP
N GONORRHOEA RRNA SPEC QL NAA+PROBE: SIGNIFICANT CHANGE UP
SPECIMEN SOURCE: SIGNIFICANT CHANGE UP

## 2021-06-15 PROCEDURE — 77065 DX MAMMO INCL CAD UNI: CPT

## 2021-06-15 PROCEDURE — G0279: CPT | Mod: 26

## 2021-06-15 PROCEDURE — 76642 ULTRASOUND BREAST LIMITED: CPT

## 2021-06-15 PROCEDURE — 77065 DX MAMMO INCL CAD UNI: CPT | Mod: 26,RT

## 2021-06-15 PROCEDURE — 76642 ULTRASOUND BREAST LIMITED: CPT | Mod: 26,50

## 2021-06-15 PROCEDURE — G0279: CPT

## 2021-06-16 PROBLEM — N83.292 OTHER OVARIAN CYST, LEFT SIDE: Status: ACTIVE | Noted: 2021-06-16

## 2021-06-16 PROBLEM — N89.8 VAGINAL DRYNESS: Status: ACTIVE | Noted: 2021-06-16

## 2021-06-16 PROBLEM — L81.9 HYPERPIGMENTATION OF SKIN: Status: ACTIVE | Noted: 2021-06-16

## 2021-06-16 PROBLEM — Z01.419 WELL WOMAN EXAM WITH ROUTINE GYNECOLOGICAL EXAM: Status: ACTIVE | Noted: 2021-06-14

## 2021-06-16 NOTE — HISTORY OF PRESENT ILLNESS
[1 Year Ago] : 1 year ago [Fair] : being in fair health [Postmenopausal] : is postmenopausal [de-identified] : first visit to this clinic [Lower-Lt-Q] : left lower quadrant [Continuous] : no continuous [Post-Menopause, No Sxs] : post-menopausal, currently without symptoms [FreeTextEntry6] : ovarian cyst [Menopause Age: ____] : age at menopause was [unfilled] [Sexually Active] : is sexually active [Monogamous] : is monogamous [Contraception] : does not use contraception

## 2021-06-16 NOTE — PHYSICAL EXAM
[Awake] : awake [Alert] : alert [Acute Distress] : no acute distress [Mass] : no breast mass [Nipple Discharge] : no nipple discharge [Axillary LAD] : no axillary lymphadenopathy [No Discharge] : no discharge [The Left Breast Was Examined] : a normal appearance [No Masses] : no breast masses were palpable [Axillary Lymph Nodes Enlarged Bilaterally] : no enlarged nodes [Tender] : non tender [Soft] : soft [Oriented x3] : oriented to person, place, and time [Normal] : uterus [Labia Majora] : labia major [Labia Minora] : labia minora [Atrophy] : no atrophy [Erythema] : no erythema [Tenderness] : no tenderness [Dry Mucosa] : moist mucosa [No Bleeding] : there was no active vaginal bleeding [Motion Tenderness] : there was no cervical motion tenderness [Pap Obtained] : a Pap smear was performed [Uterine Adnexae] : were not tender and not enlarged [FreeTextEntry6] : no abnormalites noted on bimanual exam

## 2021-06-18 ENCOUNTER — TRANSCRIPTION ENCOUNTER (OUTPATIENT)
Age: 51
End: 2021-06-18

## 2021-06-21 ENCOUNTER — NON-APPOINTMENT (OUTPATIENT)
Age: 51
End: 2021-06-21

## 2021-06-21 ENCOUNTER — APPOINTMENT (OUTPATIENT)
Dept: CARDIOLOGY | Facility: CLINIC | Age: 51
End: 2021-06-21
Payer: MEDICAID

## 2021-06-21 VITALS
SYSTOLIC BLOOD PRESSURE: 150 MMHG | BODY MASS INDEX: 33.28 KG/M2 | HEART RATE: 72 BPM | WEIGHT: 200 LBS | DIASTOLIC BLOOD PRESSURE: 92 MMHG | TEMPERATURE: 98.4 F | OXYGEN SATURATION: 99 %

## 2021-06-21 PROCEDURE — 99214 OFFICE O/P EST MOD 30 MIN: CPT

## 2021-06-21 PROCEDURE — 93000 ELECTROCARDIOGRAM COMPLETE: CPT

## 2021-06-21 NOTE — HISTORY OF PRESENT ILLNESS
[FreeTextEntry1] : 50-year-old female with a history of hypertension, hypercholesterolemia, type 2 diabetes, obesity.  She was initially seen about 8 months ago because of problems with her antihypertensive regimen.  She has been on carvedilol 25 mg twice daily and losartan 100 mg/day since.  She had good control of her blood pressure on this regimen initially, but has crept a little higher since.  She has not lost any weight.  Her most recent blood work shows a good LDL cholesterol of 92, but her diabetes control is poor with a hemoglobin A1c of 8.8.

## 2021-06-21 NOTE — ASSESSMENT
[FreeTextEntry1] : Ms Correa has had some increase in her blood pressure recently and is slightly elevated at present.  Her exam shows a repeat blood pressure of 140/80, weight of 200 pounds, clear lungs, a early systolic murmur, and no peripheral edema.  Her EKG remains within normal limits.\par \par She became hyponatremic while on hydrochlorothiazide during a urinary tract infection in the past.  She was given a prescription for 12.5 of chlorthalidone by her internist, but has not yet started and I suggested that she begin.  We also discussed losing weight, she does not really think she will be able to lose very much.  She was started on additional medication for her diabetes by her internist and will be following up with him.  I told her to follow-up here in a month so that her electrolytes could be rechecked.

## 2021-06-25 ENCOUNTER — NON-APPOINTMENT (OUTPATIENT)
Age: 51
End: 2021-06-25

## 2021-06-28 ENCOUNTER — APPOINTMENT (OUTPATIENT)
Dept: INTERNAL MEDICINE | Facility: CLINIC | Age: 51
End: 2021-06-28
Payer: MEDICAID

## 2021-06-28 VITALS
WEIGHT: 202 LBS | DIASTOLIC BLOOD PRESSURE: 70 MMHG | HEIGHT: 65 IN | SYSTOLIC BLOOD PRESSURE: 140 MMHG | HEART RATE: 78 BPM | OXYGEN SATURATION: 98 % | BODY MASS INDEX: 33.66 KG/M2

## 2021-06-28 LAB — HBA1C MFR BLD HPLC: 8.5

## 2021-06-28 PROCEDURE — 99214 OFFICE O/P EST MOD 30 MIN: CPT | Mod: 25

## 2021-06-28 PROCEDURE — 83036 HEMOGLOBIN GLYCOSYLATED A1C: CPT | Mod: QW

## 2021-06-28 NOTE — PHYSICAL EXAM
[Normal Voice/Communication] : normal voice/communication [Supple] : supple [No Edema] : there was no peripheral edema [Coordination Grossly Intact] : coordination grossly intact [No Focal Deficits] : no focal deficits [Normal Gait] : normal gait [Normal] : affect was normal and insight and judgment were intact

## 2021-06-28 NOTE — HISTORY OF PRESENT ILLNESS
[Diabetes Mellitus] : Diabetes Mellitus [Hyperlipidemia] : Hyperlipidemia [Hypertension] : Hypertension [Obesity] : Obesity [Patient was last seen on ___] : Patient was last seen on [unfilled] [High Intensity] : Patient is currently on high intensity statin therapy [Managed with medications] : managed with  medication [Fasting:  ___] : Fasting Blood Sugar: [unfilled] mg/dL [No Weight Changes] : No weight changes [FreeTextEntry6] : 50F with HTN, uncontrolled T2DM, obesity, hypothyroidism, NAFLD presents to clinic today for follow up of these chronic conditions. \par \par Reviewed note from Dr. Dahl 6/21/21 - adding chlorthalidone 12.5 mg daily, discussed importance of weight loss and DM control. Dr. Deleon - 6/14/21 - PAP and HPV testing negative [de-identified] : if she eats dinner after 8 pm then its high in AM. She eats earlier and finds the FS are 150 or so. Never lower than 159. She does not want to see endocrinologist right away. \par  [FreeTextEntry1] : pt states she did not start\par she told Dr. Dahl and he told her she can start when she feels ready [de-identified] : stopping rice intake, trying to eat more vegetables and fish, sometimes chicken\par eating ice cream - not regularly. twice weekly maybe. \par eats saltines when she is working 1-2 daily\par eating candy at times. \par she had a message with the dietician - it was supposed to be a vide appt and she cannot do weekdays. they are not avilable on weekdays. if physical appt she can take a day off. they said they will let her know. she is going to reach out to them. \par treadmill every other day

## 2021-06-28 NOTE — ASSESSMENT
[FreeTextEntry1] : 50F with HTN, uncontrolled T2DM, obesity, hypothyroidism, NAFLD presents to clinic today for follow up of these chronic conditions. \par \par 1. Hypertension - better controlled\par -on losartan and carvedilol 25 BID, adding chlorthalidone 12.5 mg daily\par -repeat BMP on diuretic \par \par 2. T2DM\par -a1c (8.8% in April); repeat today 8.5%\par -was following with endocrinologist and currently on: basaglar 18 units, glipizide 10 mg, and janumet  mg bid. she should f/u with me every 3 months at minimum \par -endocrine referral given to pt; we have not seen much improvement in A1c\par -based on ACC/AHA guidelines should be on statin; we discussed risks and benefits and started atorvastatin 20 mg but the patient is not ready to start the medication yet. \par -foot exam: normal\par -eye exam: 11/2020; repeat this november\par -urine mircro alb/cr ratio neg in october. on losartan.\par -pneumovax - wants to discuss next time\par \par 3. Obesity\par -advised f/u with med weight management, regular exercise change in diet\par -discussed risks a/w obesity and benefits of weight loss\par -going to meet with nutritionist \par \par 4. HCM\par -pap smear negative\par -reminded to send in FIT test\par -pt needs breast bx but wants to f/u with surgeon first; referral given to pt and [facility] referral given to patient as well\par -flu vaccine, pneumovax, tdap and shingrix are due however patient declined\par -COVID vaccine completed\par \par RTC in 3 months

## 2021-06-28 NOTE — REVIEW OF SYSTEMS
[Fever] : no fever [Chills] : no chills [Chest Pain] : no chest pain [Shortness Of Breath] : no shortness of breath [Anxiety] : anxiety

## 2021-06-29 ENCOUNTER — TRANSCRIPTION ENCOUNTER (OUTPATIENT)
Age: 51
End: 2021-06-29

## 2021-06-29 LAB
ALBUMIN SERPL ELPH-MCNC: 4.7 G/DL
ALP BLD-CCNC: 83 U/L
ALT SERPL-CCNC: 12 U/L
ANION GAP SERPL CALC-SCNC: 15 MMOL/L
AST SERPL-CCNC: 12 U/L
BILIRUB SERPL-MCNC: 0.4 MG/DL
BUN SERPL-MCNC: 18 MG/DL
CALCIUM SERPL-MCNC: 9.5 MG/DL
CHLORIDE SERPL-SCNC: 98 MMOL/L
CHOLEST SERPL-MCNC: 171 MG/DL
CO2 SERPL-SCNC: 22 MMOL/L
CREAT SERPL-MCNC: 1.33 MG/DL
GLUCOSE SERPL-MCNC: 194 MG/DL
HDLC SERPL-MCNC: 51 MG/DL
LDLC SERPL CALC-MCNC: 79 MG/DL
NONHDLC SERPL-MCNC: 120 MG/DL
POTASSIUM SERPL-SCNC: 5.3 MMOL/L
PROT SERPL-MCNC: 7.7 G/DL
SODIUM SERPL-SCNC: 134 MMOL/L
T4 FREE SERPL-MCNC: 1.7 NG/DL
TRIGL SERPL-MCNC: 207 MG/DL
TSH SERPL-ACNC: 0.68 UIU/ML

## 2021-07-02 ENCOUNTER — TRANSCRIPTION ENCOUNTER (OUTPATIENT)
Age: 51
End: 2021-07-02

## 2021-07-15 ENCOUNTER — TRANSCRIPTION ENCOUNTER (OUTPATIENT)
Age: 51
End: 2021-07-15

## 2021-07-19 ENCOUNTER — APPOINTMENT (OUTPATIENT)
Dept: SURGICAL ONCOLOGY | Facility: CLINIC | Age: 51
End: 2021-07-19
Payer: MEDICAID

## 2021-07-21 NOTE — ASSESSMENT
[FreeTextEntry1] : 7/19/2021: She did not keep her consultation appointment for evaluation of abnormal bilateral breast imaging

## 2021-07-21 NOTE — HISTORY OF PRESENT ILLNESS
[de-identified] : 50-year-old lady referred by her internist Dr. Kenya SON after recent abnormal bilateral breast imaging:\par \par 6/8/2021:\par Bilateral mammogram and sonogram at [facility]: BI-RADS 0.\par Left breast (4:00) nodular density, for which targeted ultrasound was recommended\par Right breast (outer) radiographic abnormality for which targeted mammogram/ultrasound were recommended.\par \par 6/21/2021:\par Supplemental imaging at [facility]:\par 1.  Left breast (4:00) subcentimeter nodule.\par Sono-guided core biopsy with clip placement recommended.\par 2.  Right breast (outer) radiographic abnormality without sonographic correlate.\par If no prior films available for comparison:\par Right stereotactic biopsy recommended.....................\par \par \par Asymptomatic.\par \par Personal history of breast disease.\par \par No previous breast biopsies.\par \par No relatives with breast cancer.\par \par Tyrer-Cuzick risk score =10.8%.\par \par \par + Prior personal history of THYROID CANCER.\par 2008\par \par \par \par MD: Dr. Kenya SON.\par \par Cardiology: Dr. Guillermo BARRETO.\par \par No pacemaker or defibrillator.\par No anticoagulants.\par \par + Hypertension.\par Treated with chlorthalidone.\par \par + Hypercholesterolemia.\par Treated with atorvastatin\par Nonalcoholic fatty liver disease.\par \par +DIABETES.\par Does not see an endocrinologist.\par Treated with Basaglar.\par \par + Thyroid cancer.\par Takes daily levothyroxine

## 2021-07-21 NOTE — REASON FOR VISIT
[FreeTextEntry2] : 7/19/2021: She did not keep her consultation appointment for evaluation of abnormal bilateral breast imaging

## 2021-07-26 ENCOUNTER — TRANSCRIPTION ENCOUNTER (OUTPATIENT)
Age: 51
End: 2021-07-26

## 2021-07-30 ENCOUNTER — TRANSCRIPTION ENCOUNTER (OUTPATIENT)
Age: 51
End: 2021-07-30

## 2021-08-01 ENCOUNTER — TRANSCRIPTION ENCOUNTER (OUTPATIENT)
Age: 51
End: 2021-08-01

## 2021-08-08 ENCOUNTER — INPATIENT (INPATIENT)
Facility: HOSPITAL | Age: 51
LOS: 1 days | Discharge: HOME | End: 2021-08-10
Attending: STUDENT IN AN ORGANIZED HEALTH CARE EDUCATION/TRAINING PROGRAM | Admitting: STUDENT IN AN ORGANIZED HEALTH CARE EDUCATION/TRAINING PROGRAM
Payer: MEDICAID

## 2021-08-08 VITALS
OXYGEN SATURATION: 100 % | DIASTOLIC BLOOD PRESSURE: 78 MMHG | HEIGHT: 65 IN | SYSTOLIC BLOOD PRESSURE: 154 MMHG | RESPIRATION RATE: 16 BRPM | TEMPERATURE: 98 F | HEART RATE: 76 BPM

## 2021-08-08 DIAGNOSIS — N17.9 ACUTE KIDNEY FAILURE, UNSPECIFIED: ICD-10-CM

## 2021-08-08 DIAGNOSIS — I10 ESSENTIAL (PRIMARY) HYPERTENSION: ICD-10-CM

## 2021-08-08 DIAGNOSIS — Z90.49 ACQUIRED ABSENCE OF OTHER SPECIFIED PARTS OF DIGESTIVE TRACT: Chronic | ICD-10-CM

## 2021-08-08 DIAGNOSIS — E87.1 HYPO-OSMOLALITY AND HYPONATREMIA: ICD-10-CM

## 2021-08-08 DIAGNOSIS — Z90.09 ACQUIRED ABSENCE OF OTHER PART OF HEAD AND NECK: Chronic | ICD-10-CM

## 2021-08-08 DIAGNOSIS — R19.7 DIARRHEA, UNSPECIFIED: ICD-10-CM

## 2021-08-08 DIAGNOSIS — E10.9 TYPE 1 DIABETES MELLITUS WITHOUT COMPLICATIONS: ICD-10-CM

## 2021-08-08 DIAGNOSIS — Z29.9 ENCOUNTER FOR PROPHYLACTIC MEASURES, UNSPECIFIED: ICD-10-CM

## 2021-08-08 DIAGNOSIS — K21.9 GASTRO-ESOPHAGEAL REFLUX DISEASE WITHOUT ESOPHAGITIS: ICD-10-CM

## 2021-08-08 DIAGNOSIS — Z90.79 ACQUIRED ABSENCE OF OTHER GENITAL ORGAN(S): Chronic | ICD-10-CM

## 2021-08-08 LAB
ALBUMIN SERPL ELPH-MCNC: 4.5 G/DL — SIGNIFICANT CHANGE UP (ref 3.3–5)
ALP SERPL-CCNC: 85 U/L — SIGNIFICANT CHANGE UP (ref 40–120)
ALT FLD-CCNC: 11 U/L — SIGNIFICANT CHANGE UP (ref 4–33)
ANION GAP SERPL CALC-SCNC: 17 MMOL/L — HIGH (ref 7–14)
ANION GAP SERPL CALC-SCNC: 18 MMOL/L — HIGH (ref 7–14)
AST SERPL-CCNC: 13 U/L — SIGNIFICANT CHANGE UP (ref 4–32)
BASOPHILS # BLD AUTO: 0.04 K/UL — SIGNIFICANT CHANGE UP (ref 0–0.2)
BASOPHILS NFR BLD AUTO: 0.3 % — SIGNIFICANT CHANGE UP (ref 0–2)
BILIRUB SERPL-MCNC: 0.4 MG/DL — SIGNIFICANT CHANGE UP (ref 0.2–1.2)
BUN SERPL-MCNC: 32 MG/DL — HIGH (ref 7–23)
BUN SERPL-MCNC: 36 MG/DL — HIGH (ref 7–23)
CALCIUM SERPL-MCNC: 7.8 MG/DL — LOW (ref 8.4–10.5)
CALCIUM SERPL-MCNC: 7.8 MG/DL — LOW (ref 8.4–10.5)
CHLORIDE SERPL-SCNC: 91 MMOL/L — LOW (ref 98–107)
CHLORIDE SERPL-SCNC: 96 MMOL/L — LOW (ref 98–107)
CO2 SERPL-SCNC: 13 MMOL/L — LOW (ref 22–31)
CO2 SERPL-SCNC: 15 MMOL/L — LOW (ref 22–31)
CREAT SERPL-MCNC: 2.21 MG/DL — HIGH (ref 0.5–1.3)
CREAT SERPL-MCNC: 2.47 MG/DL — HIGH (ref 0.5–1.3)
EOSINOPHIL # BLD AUTO: 0.22 K/UL — SIGNIFICANT CHANGE UP (ref 0–0.5)
EOSINOPHIL NFR BLD AUTO: 1.6 % — SIGNIFICANT CHANGE UP (ref 0–6)
GLUCOSE SERPL-MCNC: 142 MG/DL — HIGH (ref 70–99)
GLUCOSE SERPL-MCNC: 224 MG/DL — HIGH (ref 70–99)
HCT VFR BLD CALC: 36 % — SIGNIFICANT CHANGE UP (ref 34.5–45)
HGB BLD-MCNC: 11.6 G/DL — SIGNIFICANT CHANGE UP (ref 11.5–15.5)
IANC: 9.56 K/UL — HIGH (ref 1.5–8.5)
IMM GRANULOCYTES NFR BLD AUTO: 0.7 % — SIGNIFICANT CHANGE UP (ref 0–1.5)
LIDOCAIN IGE QN: 34 U/L — SIGNIFICANT CHANGE UP (ref 7–60)
LYMPHOCYTES # BLD AUTO: 2.95 K/UL — SIGNIFICANT CHANGE UP (ref 1–3.3)
LYMPHOCYTES # BLD AUTO: 21.8 % — SIGNIFICANT CHANGE UP (ref 13–44)
MAGNESIUM SERPL-MCNC: 1.2 MG/DL — LOW (ref 1.6–2.6)
MCHC RBC-ENTMCNC: 27.7 PG — SIGNIFICANT CHANGE UP (ref 27–34)
MCHC RBC-ENTMCNC: 32.2 GM/DL — SIGNIFICANT CHANGE UP (ref 32–36)
MCV RBC AUTO: 85.9 FL — SIGNIFICANT CHANGE UP (ref 80–100)
MONOCYTES # BLD AUTO: 0.67 K/UL — SIGNIFICANT CHANGE UP (ref 0–0.9)
MONOCYTES NFR BLD AUTO: 4.9 % — SIGNIFICANT CHANGE UP (ref 2–14)
NEUTROPHILS # BLD AUTO: 9.56 K/UL — HIGH (ref 1.8–7.4)
NEUTROPHILS NFR BLD AUTO: 70.7 % — SIGNIFICANT CHANGE UP (ref 43–77)
NRBC # BLD: 0 /100 WBCS — SIGNIFICANT CHANGE UP
NRBC # FLD: 0 K/UL — SIGNIFICANT CHANGE UP
OSMOLALITY UR: 259 MOSM/KG — SIGNIFICANT CHANGE UP (ref 50–1200)
PHOSPHATE SERPL-MCNC: 1.7 MG/DL — LOW (ref 2.5–4.5)
PLATELET # BLD AUTO: 289 K/UL — SIGNIFICANT CHANGE UP (ref 150–400)
POTASSIUM SERPL-MCNC: 4.1 MMOL/L — SIGNIFICANT CHANGE UP (ref 3.5–5.3)
POTASSIUM SERPL-MCNC: 4.4 MMOL/L — SIGNIFICANT CHANGE UP (ref 3.5–5.3)
POTASSIUM SERPL-SCNC: 4.1 MMOL/L — SIGNIFICANT CHANGE UP (ref 3.5–5.3)
POTASSIUM SERPL-SCNC: 4.4 MMOL/L — SIGNIFICANT CHANGE UP (ref 3.5–5.3)
PROT SERPL-MCNC: 7.4 G/DL — SIGNIFICANT CHANGE UP (ref 6–8.3)
RBC # BLD: 4.19 M/UL — SIGNIFICANT CHANGE UP (ref 3.8–5.2)
RBC # FLD: 13.1 % — SIGNIFICANT CHANGE UP (ref 10.3–14.5)
SARS-COV-2 RNA SPEC QL NAA+PROBE: SIGNIFICANT CHANGE UP
SODIUM SERPL-SCNC: 122 MMOL/L — LOW (ref 135–145)
SODIUM SERPL-SCNC: 128 MMOL/L — LOW (ref 135–145)
WBC # BLD: 13.54 K/UL — HIGH (ref 3.8–10.5)
WBC # FLD AUTO: 13.54 K/UL — HIGH (ref 3.8–10.5)

## 2021-08-08 PROCEDURE — 93010 ELECTROCARDIOGRAM REPORT: CPT

## 2021-08-08 PROCEDURE — 99223 1ST HOSP IP/OBS HIGH 75: CPT | Mod: GC

## 2021-08-08 PROCEDURE — 99285 EMERGENCY DEPT VISIT HI MDM: CPT | Mod: 25

## 2021-08-08 RX ORDER — DEXTROSE 50 % IN WATER 50 %
15 SYRINGE (ML) INTRAVENOUS ONCE
Refills: 0 | Status: DISCONTINUED | OUTPATIENT
Start: 2021-08-08 | End: 2021-08-10

## 2021-08-08 RX ORDER — SODIUM,POTASSIUM PHOSPHATES 278-250MG
1 POWDER IN PACKET (EA) ORAL ONCE
Refills: 0 | Status: COMPLETED | OUTPATIENT
Start: 2021-08-08 | End: 2021-08-08

## 2021-08-08 RX ORDER — DEXTROSE 50 % IN WATER 50 %
12.5 SYRINGE (ML) INTRAVENOUS ONCE
Refills: 0 | Status: DISCONTINUED | OUTPATIENT
Start: 2021-08-08 | End: 2021-08-10

## 2021-08-08 RX ORDER — SODIUM CHLORIDE 9 MG/ML
1000 INJECTION INTRAMUSCULAR; INTRAVENOUS; SUBCUTANEOUS ONCE
Refills: 0 | Status: COMPLETED | OUTPATIENT
Start: 2021-08-08 | End: 2021-08-08

## 2021-08-08 RX ORDER — LEVOTHYROXINE SODIUM 125 MCG
1 TABLET ORAL
Qty: 0 | Refills: 0 | DISCHARGE

## 2021-08-08 RX ORDER — SODIUM CHLORIDE 9 MG/ML
1000 INJECTION INTRAMUSCULAR; INTRAVENOUS; SUBCUTANEOUS
Refills: 0 | Status: DISCONTINUED | OUTPATIENT
Start: 2021-08-08 | End: 2021-08-10

## 2021-08-08 RX ORDER — ACETAMINOPHEN 500 MG
650 TABLET ORAL EVERY 6 HOURS
Refills: 0 | Status: DISCONTINUED | OUTPATIENT
Start: 2021-08-08 | End: 2021-08-10

## 2021-08-08 RX ORDER — PANTOPRAZOLE SODIUM 20 MG/1
40 TABLET, DELAYED RELEASE ORAL
Refills: 0 | Status: DISCONTINUED | OUTPATIENT
Start: 2021-08-08 | End: 2021-08-10

## 2021-08-08 RX ORDER — INSULIN LISPRO 100/ML
VIAL (ML) SUBCUTANEOUS AT BEDTIME
Refills: 0 | Status: DISCONTINUED | OUTPATIENT
Start: 2021-08-08 | End: 2021-08-10

## 2021-08-08 RX ORDER — DEXTROSE 50 % IN WATER 50 %
25 SYRINGE (ML) INTRAVENOUS ONCE
Refills: 0 | Status: DISCONTINUED | OUTPATIENT
Start: 2021-08-08 | End: 2021-08-10

## 2021-08-08 RX ORDER — LEVOTHYROXINE SODIUM 125 MCG
150 TABLET ORAL
Refills: 0 | Status: DISCONTINUED | OUTPATIENT
Start: 2021-08-08 | End: 2021-08-10

## 2021-08-08 RX ORDER — INSULIN LISPRO 100/ML
VIAL (ML) SUBCUTANEOUS
Refills: 0 | Status: DISCONTINUED | OUTPATIENT
Start: 2021-08-08 | End: 2021-08-10

## 2021-08-08 RX ORDER — INSULIN GLARGINE 100 [IU]/ML
14 INJECTION, SOLUTION SUBCUTANEOUS AT BEDTIME
Refills: 0 | Status: DISCONTINUED | OUTPATIENT
Start: 2021-08-08 | End: 2021-08-10

## 2021-08-08 RX ORDER — INSULIN GLARGINE 100 [IU]/ML
18 INJECTION, SOLUTION SUBCUTANEOUS
Qty: 0 | Refills: 0 | DISCHARGE

## 2021-08-08 RX ORDER — MAGNESIUM SULFATE 500 MG/ML
2 VIAL (ML) INJECTION ONCE
Refills: 0 | Status: COMPLETED | OUTPATIENT
Start: 2021-08-08 | End: 2021-08-08

## 2021-08-08 RX ORDER — CARVEDILOL PHOSPHATE 80 MG/1
1 CAPSULE, EXTENDED RELEASE ORAL
Qty: 0 | Refills: 0 | DISCHARGE

## 2021-08-08 RX ORDER — CARVEDILOL PHOSPHATE 80 MG/1
25 CAPSULE, EXTENDED RELEASE ORAL EVERY 12 HOURS
Refills: 0 | Status: DISCONTINUED | OUTPATIENT
Start: 2021-08-08 | End: 2021-08-10

## 2021-08-08 RX ORDER — HEPARIN SODIUM 5000 [USP'U]/ML
5000 INJECTION INTRAVENOUS; SUBCUTANEOUS EVERY 8 HOURS
Refills: 0 | Status: DISCONTINUED | OUTPATIENT
Start: 2021-08-08 | End: 2021-08-10

## 2021-08-08 RX ORDER — GLUCAGON INJECTION, SOLUTION 0.5 MG/.1ML
1 INJECTION, SOLUTION SUBCUTANEOUS ONCE
Refills: 0 | Status: DISCONTINUED | OUTPATIENT
Start: 2021-08-08 | End: 2021-08-10

## 2021-08-08 RX ORDER — ESOMEPRAZOLE MAGNESIUM 40 MG/1
1 CAPSULE, DELAYED RELEASE ORAL
Qty: 0 | Refills: 0 | DISCHARGE

## 2021-08-08 RX ORDER — FAMOTIDINE 10 MG/ML
20 INJECTION INTRAVENOUS ONCE
Refills: 0 | Status: COMPLETED | OUTPATIENT
Start: 2021-08-08 | End: 2021-08-08

## 2021-08-08 RX ORDER — DILTIAZEM HCL 120 MG
1 CAPSULE, EXT RELEASE 24 HR ORAL
Qty: 0 | Refills: 0 | DISCHARGE

## 2021-08-08 RX ORDER — ONDANSETRON 8 MG/1
4 TABLET, FILM COATED ORAL ONCE
Refills: 0 | Status: COMPLETED | OUTPATIENT
Start: 2021-08-08 | End: 2021-08-08

## 2021-08-08 RX ORDER — SITAGLIPTIN AND METFORMIN HYDROCHLORIDE 500; 50 MG/1; MG/1
1 TABLET, FILM COATED ORAL
Qty: 0 | Refills: 0 | DISCHARGE

## 2021-08-08 RX ORDER — LEVOTHYROXINE SODIUM 125 MCG
175 TABLET ORAL
Refills: 0 | Status: DISCONTINUED | OUTPATIENT
Start: 2021-08-08 | End: 2021-08-10

## 2021-08-08 RX ADMIN — HEPARIN SODIUM 5000 UNIT(S): 5000 INJECTION INTRAVENOUS; SUBCUTANEOUS at 22:04

## 2021-08-08 RX ADMIN — Medication 30 MILLILITER(S): at 11:18

## 2021-08-08 RX ADMIN — Medication 50 GRAM(S): at 13:30

## 2021-08-08 RX ADMIN — SODIUM CHLORIDE 1000 MILLILITER(S): 9 INJECTION INTRAMUSCULAR; INTRAVENOUS; SUBCUTANEOUS at 11:18

## 2021-08-08 RX ADMIN — Medication 30 MILLILITER(S): at 23:59

## 2021-08-08 RX ADMIN — FAMOTIDINE 20 MILLIGRAM(S): 10 INJECTION INTRAVENOUS at 11:18

## 2021-08-08 RX ADMIN — INSULIN GLARGINE 14 UNIT(S): 100 INJECTION, SOLUTION SUBCUTANEOUS at 21:44

## 2021-08-08 RX ADMIN — ONDANSETRON 4 MILLIGRAM(S): 8 TABLET, FILM COATED ORAL at 11:18

## 2021-08-08 RX ADMIN — Medication 1 PACKET(S): at 14:54

## 2021-08-08 RX ADMIN — SODIUM CHLORIDE 90 MILLILITER(S): 9 INJECTION INTRAMUSCULAR; INTRAVENOUS; SUBCUTANEOUS at 14:54

## 2021-08-08 RX ADMIN — SODIUM CHLORIDE 90 MILLILITER(S): 9 INJECTION INTRAMUSCULAR; INTRAVENOUS; SUBCUTANEOUS at 21:13

## 2021-08-08 NOTE — ED PROVIDER NOTE - CARE PLAN
Principal Discharge DX:	Diarrhea  Secondary Diagnosis:	SUREKHA (acute kidney injury)  Secondary Diagnosis:	Hyponatremia

## 2021-08-08 NOTE — H&P ADULT - PROBLEM SELECTOR PLAN 5
-On basaglar 18 units at bedtime, glipizide 10mg BID, Janumet 50/500mg BID  -Hold PO DM agents  -c/w lantus 14units at bedtime  -c/w ISS  -Monitor fingersticks

## 2021-08-08 NOTE — H&P ADULT - ATTENDING COMMENTS
51 yo F h/o DM (on long acting insulin), HTN, GERD, hx thyroidectomy, appendectomy presenting with diarrhea, nausea and vomiting likely 2/2 gastroenteritis, complicated by SUREKHA.   Diarrhea- Improved in ED. Check Stool PCR, O+P and culture. Continue IVFs  SUREKHA- Likely prerenal in the setting of hypovolemia from GI losses. Continue IVFs and trend Cr  Hypovolemic hyponatremia. Continue IVFs and Monitor CMP q8. Goal correction 6-8mEq/24h  Also noted to have Hypomagnesemia and hypophosphatemia. Will replete and monitor 51 yo F h/o DM (on long acting insulin), HTN, GERD, hx thyroidectomy, appendectomy presenting with diarrhea, nausea and vomiting likely 2/2 gastroenteritis, complicated by SUREKHA.   Diarrhea- Improved in ED. Check Stool PCR, O+P and culture. Continue IVFs  SUREKHA- Likely prerenal in the setting of hypovolemia from GI losses. Continue IVFs and trend Cr  Hypovolemic hyponatremia. Continue IVFs and Monitor CMP q8. Goal correction 6-8mEq/24h  DM type II- Lantus 14u + SSI. Monitor FSG and titrate insulin as needed   Also noted to have Hypomagnesemia and hypophosphatemia. Will replete and monitor

## 2021-08-08 NOTE — H&P ADULT - NSHPREVIEWOFSYSTEMS_GEN_ALL_CORE
CONSTITUTIONAL:  No weight loss, fever, chills, weakness or fatigue.  SKIN:  No rash or itching.  CARDIOVASCULAR:  +chest discomfort, No chest pain. No palpitations.  RESPIRATORY:  No shortness of breath, cough or sputum.  GASTROINTESTINAL:  No nausea/ vomiting. No abdominal pain or blood.  NEUROLOGICAL:  No headache, +dizziness, no syncope  MUSCULOSKELETAL:  No muscle, back pain, joint pain or stiffness.  PSYCHIATRIC:  No history of depression or anxiety.

## 2021-08-08 NOTE — H&P ADULT - HISTORY OF PRESENT ILLNESS
50yF w/pmhx DM (on long acting insulin), HTN, GERD, hx thyroidectomy, appendectomy presenting with diarrhea, nausea and vomiting. Pt states symptoms started 4 days ago. Denies eating any new foods, denies recent travel or sick contacts. States she has about 6 BMs/ day. Described as green and watery with some mucus. Took imodium without improvement in symptoms. Also endorses having 2 episodes of vomiting yesterday (NBNB). Also reports having significant heart burn yesterday, had stopped taking nexium due to concern for diarrhea. States she started taking nexium again last night/this morning without improvement in heart burn. ROS otherwise positive for chronic dizziness which pt states has been worse over the past 2 days. ROS otherwise negative. Denies abdominal pain, fevers, chills, chest pain, SOB, Skin rash, dysuria, recent travel or sick contacts.      In the E.D, vitals stable, labs sig for mild leukocytosis to 13.54, scr 2.47 (baseline 1.13). Pt given maalox, famotidine, zofran 4mg x 1, admitted to medicine for further management.

## 2021-08-08 NOTE — H&P ADULT - PROBLEM SELECTOR PLAN 2
-Likely pre-renal iso profuse diarrhea  -sCr 2.47, baseline 1.13 (October 2020)  -Obtain urine lytes to calculate FeNa  -s/p IVF bolus, c/w IVF   -Avoid nephrotoxic agents  -Renally dose meds

## 2021-08-08 NOTE — ED ADULT NURSE NOTE - NS ED NOTE  TALK SOMEONE YN
[address] History and Physical Exam 
 
Patient: Faustino Ho MRN: [id number]  SSN: [id number] YOB: 1944  Age: 66 y.o. Sex: female Subjective: Chief Complaint: Left hip pain History of Present Illness:  Patient complains of left hip pain and difficulty ambulating, which has progressed over the past several months. MRI showed full thickness tearing of the gluteus minimus and vastus lateralis tendons, partial thickness tearing of the gluteus medius tendon, and chronic near full-thickness tearing of the hamstring tendon. The patient's pain has persisted and progressed despite conservative treatments and therapies. The patient has been previously treated with nsaids. The patient has at this time opted for surgical intervention. Past Medical History:  
Diagnosis Date  Arthritis   
 osteo  CAD (coronary artery disease)  Gout  Hypertension  Hypoparathyroidism ([address]   
 per pt  \"hypoparathyroid\"  Hypothyroid  MI (myocardial infarction) ([facility] 2013  Nausea & vomiting  Stroke ([facility] [address] 08/2016  
 mini stroke  Tear of left gluteus minimus tendon 11/1/2018  Toxic primary thyroid hyperplasia   
 s/p thyroid removal @ 1962 Past Surgical History:  
Procedure Laterality Date  HX APPENDECTOMY  HX CATARACT REMOVAL Bilateral   
 HX CERVICAL FUSION    
 HX HEART CATHETERIZATION  04/2012  
 heart stent  HX KNEE ARTHROSCOPY    
 HX KNEE REPLACEMENT Bilateral   
 HX LAP CHOLECYSTECTOMY  HX ROTATOR CUFF REPAIR Right  HX THYROIDECTOMY  HX UROLOGICAL    
 bladder repair Social History Occupational History  Not on file Tobacco Use  Smoking status: Never Smoker  Smokeless tobacco: Never Used Substance and Sexual Activity  Alcohol use: Yes Comment: 2 x yearly  Drug use: No  
 Sexual activity: Not Currently Prior to Admission medications Medication Sig Start Date End Date Taking? Authorizing Provider  
ascorbic acid, vitamin C, (VITAMIN C) 500 mg tablet Take 500 mg by mouth two (2) times a day. Provider, Historical  
Biotin 2,500 mcg cap Take 5,000 mcg by mouth daily. Provider, Historical  
cyclobenzaprine (FLEXERIL) 10 mg tablet Take 10 mg by mouth nightly. Provider, Historical  
levothyroxine (SYNTHROID) 112 mcg tablet Take 112 mcg by mouth Daily (before breakfast). Provider, Historical  
aspirin (ASPIRIN) 325 mg tablet Take 325 mg by mouth daily. Provider, Historical  
allopurinol (ZYLOPRIM) 100 mg tablet Take 100 mg by mouth two (2) times a day. Indications: prevention of acute gout attack    Provider, Historical  
cyanocobalamin (VITAMIN B-12) 1,000 mcg tablet Take 1,000 mcg by mouth Every Mon, Wed & Sun.    Provider, Historical  
Calcium-Cholecalciferol, D3, (CALCIUM 600 WITH VITAMIN D3) 600 mg(1,500mg) -400 unit cap Take 1 Tab by mouth two (2) times a day. Indications: PREVENTION OF VITAMIN D DEFICIENCY    Provider, Historical  
B.infantis-B.ani-B.long-B.bifi (PROBIOTIC 4X) 10-15 mg TbEC Take 1 Tab by mouth daily. Provider, Historical  
acetaminophen (TYLENOL EXTRA STRENGTH) 500 mg tablet Take 1,000 mg by mouth every six (6) hours as needed for Pain. Provider, Historical  
metoprolol tartrate (LOPRESSOR) 25 mg tablet Take 25 mg by mouth two (2) times a day. Provider, Historical  
calcitRIOL (ROCALTROL) 0.5 mcg capsule Take 1 mcg by mouth two (2) times a day. Provider, Historical  
medroxyPROGESTERone (PROVERA) 2.5 mg tablet Take 2.5 mg by mouth daily. Provider, Historical  
lisinopril (PRINIVIL, ZESTRIL) 20 mg tablet Take 20 mg by mouth daily. 4/11/12   Provider, Historical  
traZODone (DESYREL) 50 mg tablet Take 50 mg by mouth nightly. Provider, Historical  
estradiol (VIVELLE-DOT) 0.025 mg/24 hr ptsw 1 Patch two (2) times a week.     Provider, Historical  
 ezetimibe (ZETIA) 10 mg tablet Take 5 mg by mouth nightly. Julien Burris MD  
colchicine (COLCRYS) 0.6 mg tablet Take 0.6 mg by mouth as needed. Julien Burris MD  
omega-3 fatty acids-vitamin e (FISH OIL) 1,000 mg cap Take 1 Cap by mouth daily. Julien Burris MD  
 
 
Allergies: Allergies Allergen Reactions  Meperidine Other (comments) Crazy dreams  Morphine Nausea and Vomiting Can take with anti nausea medicine  Propoxyphene N-Acetaminophen Other (comments) Crazy dreams Review of Systems: A comprehensive review of systems was negative except for that written in the History of Present Illness. Objective:   
  
Physical Exam: 
HEENT: Normocephalic, atraumatic Lungs:  Clear to auscultation Heart:   Regular rate and rhythm Abdomen: Soft Extremities:  Pain with range of motion of the left hip. Passive flexion  degrees, 
                     passive internal rotation 0-10 degrees, with pain throughout ROM,  
                     passive external rotation 10-20 degrees with pain at the arc of motion. Antalgic gait noted. Assessment:   
 
Gluteal tendon tears of the left hip. Plan:    
 
Proceed with scheduled LEFT HIP OPEN REPAIR OF GLUTEAL TENDON The various methods of treatment have been discussed with the patient and family. After consideration of risks, benefits, and other options for treatment, the patient has consented to surgical interventions. Questions were answered and preoperative teaching was done by Dr Stephanie Funes. Signed By: KAUSHIK Jones November 26, 2018 No

## 2021-08-08 NOTE — ED ADULT NURSE NOTE - OBJECTIVE STATEMENT
Pt received to intake area complaining of diarrhea x 4 days. Pt also complaining of nausea and vomiting. Pt denies chest pain, sob, fevr or chills. IV access obtained, labs drawn and sent.

## 2021-08-08 NOTE — ED ADULT TRIAGE NOTE - CHIEF COMPLAINT QUOTE
c/o green diarrhea x past 4 days . c/o nausea,vomited x 2 yesterday. denies fever,abd pains. c/o discomfort  esophageal area with  bitter taste to mouth.  diarrhea x 2 today. fs 212

## 2021-08-08 NOTE — H&P ADULT - NSHPPHYSICALEXAM_GEN_ALL_CORE
GENERAL APPEARANCE: Well developed, NAD  HEENT:  PERRL, EOMI. hearing grossly intact.  NECK: Neck supple, non-tender no lymphadenopathy, masses or thyromegaly.  CARDIAC: Normal S1 and S2. no mrg. RRR  LUNGS: Clear to auscultation B/L, no rales, rhonchi, or wheezing  ABDOMEN: Soft , NTND, bowel sounds normal. No guarding or rebound.   MUSCULOSKELETAL: ROM intact.  No joint erythema or tenderness.   EXTREMITIES: No edema. Peripheral pulses intact.   NEUROLOGICAL: Non focal. Strength and sensation symmetric and intact throughout.   SKIN: Warm and dry , Well perfused  PSYCHIATRIC: AOx3 , Normal mood and affect

## 2021-08-08 NOTE — ED PROVIDER NOTE - OBJECTIVE STATEMENT
50yF w/pmhx DM (on long acting insulin), HTN, GERD, hx thyroidectomy, appendectomy presenting with diarrhea, nausea and vomiting. Pt states she developed diarrhea on 8/05 and has had 4-6 episodes a day, reports green with mucus. Pt states last night she developed heartburn with 2 episodes of emesis. Pt started taking imodium yesterday without relief. She reports one episode of dark stool after taking pepto. Associated she has bitter tast 50yF w/pmhx DM (on long acting insulin), HTN, GERD, hx thyroidectomy, appendectomy presenting with diarrhea, nausea and vomiting. Pt states she developed diarrhea on 8/05 and has had 4-6 episodes a day, reports green with mucus. Pt states last night she developed heartburn with 2 episodes of emesis. Pt started taking imodium yesterday without relief. She reports one episode of dark stool after taking pepto. Associated she has bitter taste in her mouth. Pt denies fever, chills, abdominal pain, chest pain, shortness of breath, palpitations, dysuria, urinary frequency, recent travel, recent abx use or any other concerns.

## 2021-08-08 NOTE — H&P ADULT - ASSESSMENT
50yF w/pmhx DM (on long acting insulin), HTN, GERD, hx thyroidectomy, appendectomy presenting with diarrhea, nausea and vomiting likely 2/2 gastroenteritis

## 2021-08-08 NOTE — ED PROVIDER NOTE - ATTENDING CONTRIBUTION TO CARE
50F h/o DM on insulin, HTN, GERD, thyroid Ca s/p thyroidectomy p/w diarrhea, nausea and vomiting. Symptoms started ~4 days ago with diarrhea then subsequently had vomiting and reflux symptoms. Reports ~6 episodes of watery green diarrhea per day. Reports vomiting NBNB x2 yesterday. No fever/chills. 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 Denies abd pain, cp, sob, URI symptoms. Started taking imodium without relief. No recent travels.  Gen: nad  CV: rrr, no murmur  Pulm: clear lungs  Abd: soft, nt,nd  Ext: no edema/swelling  MDM: 50F h/o DM on insulin, HTN, GERD, thyroid Ca s/p thyroidectomy p/w diarrhea, nausea and vomiting x4 days likely 2/2 viral gastroenteritis. No tenderness on exam, can forgo imaging at this time. Will check cbc, electrolytes, renal function (reports possible h/o CKD), give IVF, GI cocktail for symptom relief

## 2021-08-08 NOTE — H&P ADULT - PROBLEM SELECTOR PLAN 1
Pt presenting w/ diarrhea, nausea and vomiting likely 2/2 viral gastroenteritis. Afebrile with mild leukocytosis. Low c/f pancreatitis; lipase wnl   -s/p 1L NS Bolus  -c/w IVF NS 90 cc/hr x 24 hours  -F/u GI PCR, Stool culture, stool o/p  -Monitor off abx  -Trend leukocytosis

## 2021-08-08 NOTE — H&P ADULT - NSHPLABSRESULTS_GEN_ALL_CORE
11.6   13.54 )-----------( 289      ( 08 Aug 2021 11:27 )             36.0     Auto Eosinophil # 0.22  / Auto Eosinophil % 1.6   / Auto Neutrophil # 9.56  / Auto Neutrophil % 70.7  / BANDS % x        08-08    122<L>  |  91<L>  |  36<H>  ----------------------------<  224<H>  4.4   |  13<L>  |  2.47<H>    Ca    7.8<L>      08 Aug 2021 11:27  Mg     1.20     08-08  Phos  1.7     08-08  TPro  7.4  /  Alb  4.5  /  TBili  0.4  /  DBili  x   /  AST  13  /  ALT  11  /  AlkPhos  85  08-08

## 2021-08-08 NOTE — H&P ADULT - PROBLEM SELECTOR PLAN 3
Likely hypovolemic hyponatremia iso diarrhea  -s/p IVF bolus 1L  -C/w IVF NS @ 90cc / hour  -Obtain urine Na, serum osm, urine osm  -Monitor BMPsq8 hours  -Avoid overcorrection , 6-8mEQ/24 hours

## 2021-08-08 NOTE — ED PROVIDER NOTE - CLINICAL SUMMARY MEDICAL DECISION MAKING FREE TEXT BOX
50yF w/pmhx DM (on long acting insulin), HTN, GERD, hx thyroidectomy, appendectomy presenting with diarrhea, nausea and vomiting likely 2/2 viral gastroenteritis - will get labs, IVF, GI cocktail, stool sample if able

## 2021-08-09 ENCOUNTER — TRANSCRIPTION ENCOUNTER (OUTPATIENT)
Age: 51
End: 2021-08-09

## 2021-08-09 DIAGNOSIS — E11.9 TYPE 2 DIABETES MELLITUS WITHOUT COMPLICATIONS: ICD-10-CM

## 2021-08-09 LAB
ANION GAP SERPL CALC-SCNC: 14 MMOL/L — SIGNIFICANT CHANGE UP (ref 7–14)
BUN SERPL-MCNC: 31 MG/DL — HIGH (ref 7–23)
C DIFF BY PCR RESULT: SIGNIFICANT CHANGE UP
C DIFF TOX GENS STL QL NAA+PROBE: SIGNIFICANT CHANGE UP
CALCIUM SERPL-MCNC: 7.8 MG/DL — LOW (ref 8.4–10.5)
CHLORIDE SERPL-SCNC: 99 MMOL/L — SIGNIFICANT CHANGE UP (ref 98–107)
CO2 SERPL-SCNC: 16 MMOL/L — LOW (ref 22–31)
CREAT SERPL-MCNC: 1.87 MG/DL — HIGH (ref 0.5–1.3)
CULTURE RESULTS: SIGNIFICANT CHANGE UP
GLUCOSE BLDC GLUCOMTR-MCNC: 160 MG/DL — HIGH (ref 70–99)
GLUCOSE BLDC GLUCOMTR-MCNC: 177 MG/DL — HIGH (ref 70–99)
GLUCOSE BLDC GLUCOMTR-MCNC: 187 MG/DL — HIGH (ref 70–99)
GLUCOSE SERPL-MCNC: 118 MG/DL — HIGH (ref 70–99)
HCT VFR BLD CALC: 35.7 % — SIGNIFICANT CHANGE UP (ref 34.5–45)
HGB BLD-MCNC: 11.5 G/DL — SIGNIFICANT CHANGE UP (ref 11.5–15.5)
MAGNESIUM SERPL-MCNC: 2.2 MG/DL — SIGNIFICANT CHANGE UP (ref 1.6–2.6)
MCHC RBC-ENTMCNC: 27.8 PG — SIGNIFICANT CHANGE UP (ref 27–34)
MCHC RBC-ENTMCNC: 32.2 GM/DL — SIGNIFICANT CHANGE UP (ref 32–36)
MCV RBC AUTO: 86.2 FL — SIGNIFICANT CHANGE UP (ref 80–100)
NRBC # BLD: 0 /100 WBCS — SIGNIFICANT CHANGE UP
NRBC # FLD: 0 K/UL — SIGNIFICANT CHANGE UP
OSMOLALITY SERPL: 296 MOSM/KG — HIGH (ref 275–295)
PHOSPHATE SERPL-MCNC: 1.9 MG/DL — LOW (ref 2.5–4.5)
PLATELET # BLD AUTO: 284 K/UL — SIGNIFICANT CHANGE UP (ref 150–400)
POTASSIUM SERPL-MCNC: 4.2 MMOL/L — SIGNIFICANT CHANGE UP (ref 3.5–5.3)
POTASSIUM SERPL-SCNC: 4.2 MMOL/L — SIGNIFICANT CHANGE UP (ref 3.5–5.3)
RBC # BLD: 4.14 M/UL — SIGNIFICANT CHANGE UP (ref 3.8–5.2)
RBC # FLD: 13.1 % — SIGNIFICANT CHANGE UP (ref 10.3–14.5)
SODIUM SERPL-SCNC: 129 MMOL/L — LOW (ref 135–145)
SODIUM UR-SCNC: 46 MMOL/L — SIGNIFICANT CHANGE UP
SPECIMEN SOURCE: SIGNIFICANT CHANGE UP
WBC # BLD: 9.64 K/UL — SIGNIFICANT CHANGE UP (ref 3.8–10.5)
WBC # FLD AUTO: 9.64 K/UL — SIGNIFICANT CHANGE UP (ref 3.8–10.5)

## 2021-08-09 PROCEDURE — 99233 SBSQ HOSP IP/OBS HIGH 50: CPT | Mod: GC

## 2021-08-09 RX ORDER — POTASSIUM PHOSPHATE, MONOBASIC POTASSIUM PHOSPHATE, DIBASIC 236; 224 MG/ML; MG/ML
15 INJECTION, SOLUTION INTRAVENOUS ONCE
Refills: 0 | Status: COMPLETED | OUTPATIENT
Start: 2021-08-09 | End: 2021-08-09

## 2021-08-09 RX ORDER — FAMOTIDINE 10 MG/ML
20 INJECTION INTRAVENOUS DAILY
Refills: 0 | Status: DISCONTINUED | OUTPATIENT
Start: 2021-08-09 | End: 2021-08-10

## 2021-08-09 RX ADMIN — Medication 150 MICROGRAM(S): at 05:02

## 2021-08-09 RX ADMIN — POTASSIUM PHOSPHATE, MONOBASIC POTASSIUM PHOSPHATE, DIBASIC 62.5 MILLIMOLE(S): 236; 224 INJECTION, SOLUTION INTRAVENOUS at 08:26

## 2021-08-09 RX ADMIN — Medication 1 TABLET(S): at 05:02

## 2021-08-09 RX ADMIN — INSULIN GLARGINE 14 UNIT(S): 100 INJECTION, SOLUTION SUBCUTANEOUS at 22:37

## 2021-08-09 RX ADMIN — Medication 1: at 12:06

## 2021-08-09 RX ADMIN — CARVEDILOL PHOSPHATE 25 MILLIGRAM(S): 80 CAPSULE, EXTENDED RELEASE ORAL at 05:02

## 2021-08-09 RX ADMIN — HEPARIN SODIUM 5000 UNIT(S): 5000 INJECTION INTRAVENOUS; SUBCUTANEOUS at 05:01

## 2021-08-09 RX ADMIN — HEPARIN SODIUM 5000 UNIT(S): 5000 INJECTION INTRAVENOUS; SUBCUTANEOUS at 13:18

## 2021-08-09 RX ADMIN — Medication 1: at 18:08

## 2021-08-09 NOTE — PROGRESS NOTE ADULT - PROBLEM SELECTOR PLAN 1
Pt presenting w/ diarrhea, nausea and vomiting.  Viral gastroenteritis most likely given afebrile and mild leukocytosis (now resolved).  Bacterial colitis less likely given no recent travel.  C diff. less likely given afebrile and no leukocytosis IBD less likely given patient's age and no family history of IBD  -s/p 1L NS Bolus  -c/w IVF NS 90 cc/hr x 24 hours  -F/u GI PCR, Stool culture, stool o/p  -Monitor off abx  -leukocytosis now resolved Pt presenting w/ diarrhea, nausea and vomiting.  Viral gastroenteritis most likely given afebrile and mild leukocytosis (now resolved).  Bacterial colitis less likely given no recent travel.  C diff. less likely given afebrile and no leukocytosis IBD less likely given patient's age and no family history of IBD  -s/p 1L NS Bolus  -c/w IVF NS 90 cc/hr x 24 hours  -F/u Stool culture, stool o/p  -GI PCR negative   -Monitor off abx  -leukocytosis now resolved Pt presenting w/ diarrhea, nausea and vomiting. Viral gastroenteritis most likely given afebrile and mild leukocytosis (now resolved).  Bacterial colitis less likely given no recent travel.  C diff. less likely given afebrile and no leukocytosis IBD less likely given patient's age and no family history of IBD  -s/p 1L NS Bolus  -c/w IVF NS 90 cc/hr x 24 hours  -F/u Stool culture, stool o/p  -GI PCR negative   -Monitor off abx  -leukocytosis now resolved

## 2021-08-09 NOTE — PROGRESS NOTE ADULT - PROBLEM SELECTOR PLAN 3
Likely hypovolemic hyponatremia given diarrhea  -s/p IVF bolus 1L  -C/w IVF NS @ 90cc / hour  -hyponatremia improving now 129 from 122   -f/u urine Na, serum osm, urine osm  -Monitor BMPsq8 hours  -Avoid overcorrection , 6-8mEQ/24 hours Likely hypovolemic hyponatremia given diarrhea  -s/p IVF bolus 1L  -C/w IVF NS @ 90cc / hour  -hyponatremia improving now 129 from 122   -f/u urine Na, serum osm, urine osm  -Monitor BMPsq12 hours  -Avoid overcorrection , 6-8mEQ/24 hours Likely hypovolemic hyponatremia given diarrhea  -s/p IVF bolus 1L  -C/w IVF NS @ 90cc / hour  -hyponatremia improving now 129 from 122   -f/u urine Na, serum osm, urine osm  -Monitor BMPs daily  -Avoid overcorrection , 6-8mEQ/24 hours

## 2021-08-09 NOTE — PROGRESS NOTE ADULT - SUBJECTIVE AND OBJECTIVE BOX
Howie Casandra, MS4  Pager [phone]    CHIEF COMPLAINT:  Patient is a 50y old  Female who presents with a chief complaint of Diarrhea; SUREKHA       INTERVAL HISTORY/OVERNIGHT EVENTS:    No acute events overnight.  Patient came in for       REVIEW OF SYSTEMS:    Constitutional:     [ ] negative [ ] fevers [ ] chills [ ] weight loss [ ] weight gain  HEENT:                  [ ] negative [ ] dry eyes [ ] eye irritation [ ] postnasal drip [ ] nasal congestion  CV:                         [ ] negative  [ ] chest pain [ ] orthopnea [ ] palpitations [ ] murmur  Resp:                     [ ] negative [ ] cough [ ] shortness of breath [ ] dyspnea [ ] wheezing [ ] sputum [ ] hemoptysis  GI:                          [ ] negative [ ] nausea [ ] vomiting [ ] diarrhea [ ] constipation [ ] abd pain [ ] dysphagia   :                        [ ] negative [ ] dysuria [ ] nocturia [ ] hematuria [ ] increased urinary frequency  Musculoskeletal: [ ] negative [ ] back pain [ ] myalgias [ ] arthralgias [ ] fracture  Skin:                       [ ] negative [ ] rash [ ] itch  Neurological:        [ ] negative [ ] headache [ ] dizziness [ ] syncope [ ] weakness [ ] numbness  Psychiatric:           [ ] negative [ ] anxiety [ ] depression  Endocrine:            [ ] negative [ ] diabetes [ ] thyroid problem  Heme/Lymph:      [ ] negative [ ] anemia [ ] bleeding problem  Allergic/Immune: [ ] negative [ ] itchy eyes [ ] nasal discharge [ ] hives [ ] angioedema    [ ] All other systems negative  [ ] Unable to assess ROS because ________.    MEDICATIONS:  MEDICATIONS  (STANDING):  calcium carbonate 1250 mG  + Vitamin D (OsCal 500 + D) 1 Tablet(s) Oral <User Schedule>  carvedilol 25 milliGRAM(s) Oral every 12 hours  dextrose 40% Gel 15 Gram(s) Oral once  dextrose 50% Injectable 25 Gram(s) IV Push once  dextrose 50% Injectable 12.5 Gram(s) IV Push once  dextrose 50% Injectable 25 Gram(s) IV Push once  glucagon  Injectable 1 milliGRAM(s) IntraMuscular once  heparin   Injectable 5000 Unit(s) SubCutaneous every 8 hours  insulin glargine Injectable (LANTUS) 14 Unit(s) SubCutaneous at bedtime  insulin lispro (ADMELOG) corrective regimen sliding scale   SubCutaneous three times a day before meals  insulin lispro (ADMELOG) corrective regimen sliding scale   SubCutaneous at bedtime  levothyroxine 150 MICROGram(s) Oral <User Schedule>  levothyroxine 175 MICROGram(s) Oral <User Schedule>  pantoprazole    Tablet 40 milliGRAM(s) Oral before breakfast  sodium chloride 0.9%. 1000 milliLiter(s) (90 mL/Hr) IV Continuous <Continuous>    MEDICATIONS  (PRN):  acetaminophen   Tablet .. 650 milliGRAM(s) Oral every 6 hours PRN Mild Pain (1 - 3), Moderate Pain (4 - 6)  aluminum hydroxide/magnesium hydroxide/simethicone Suspension 30 milliLiter(s) Oral every 6 hours PRN Dyspepsia      ALLERGIES:  Allergies    lisinopril (Unknown)  sulfa drugs (Rash)    Intolerances    hydrochlorothiazide (Unknown)      OBJECTIVE:  ICU Vital Signs Last 24 Hrs  T(C): 36.6 (09 Aug 2021 04:28), Max: 36.9 (08 Aug 2021 10:20)  T(F): 97.9 (09 Aug 2021 04:28), Max: 98.5 (08 Aug 2021 10:20)  HR: 68 (09 Aug 2021 04:28) (68 - 86)  BP: 121/57 (09 Aug 2021 04:28) (112/51 - 154/78)  BP(mean): --  ABP: --  ABP(mean): --  RR: 18 (09 Aug 2021 04:28) (16 - 18)  SpO2: 100% (09 Aug 2021 04:28) (100% - 100%)      Adult Advanced Hemodynamics Last 24 Hrs  CVP(mm Hg): --  CVP(cm H2O): --  CO: --  CI: --  PA: --  PA(mean): --  PCWP: --  SVR: --  SVRI: --  PVR: --  PVRI: --  CAPILLARY BLOOD GLUCOSE      POCT Blood Glucose.: 140 mg/dL (09 Aug 2021 07:40)  POCT Blood Glucose.: 207 mg/dL (08 Aug 2021 21:36)  POCT Blood Glucose.: 132 mg/dL (08 Aug 2021 18:23)  POCT Blood Glucose.: 212 mg/dL (08 Aug 2021 10:25)    CAPILLARY BLOOD GLUCOSE      POCT Blood Glucose.: 140 mg/dL (09 Aug 2021 07:40)    I&O's Summary    Daily Height in cm: 165.1 (08 Aug 2021 22:52)    Daily     PHYSICAL EXAMINATION:  General: WN/WD NAD  HEENT: PERRLA, EOMI, moist mucous membranes  Neurology: A&Ox3, nonfocal, NICHOLAS x 4  Respiratory: CTA B/L, normal respiratory effort, no wheezes, crackles, rales  CV: RRR, S1S2, no murmurs, rubs or gallops  Abdominal: Soft, NT, ND +BS, Last BM  Extremities: No edema, + peripheral pulses  Incisions:   Tubes:    LABS:                          11.5   9.64  )-----------( 284      ( 09 Aug 2021 06:54 )             35.7     08-09    129<L>  |  99  |  31<H>  ----------------------------<  118<H>  4.2   |  16<L>  |  1.87<H>    Ca    7.8<L>      09 Aug 2021 06:54  Phos  1.9     08-09  Mg     2.20     08-09    TPro  7.4  /  Alb  4.5  /  TBili  0.4  /  DBili  x   /  AST  13  /  ALT  11  /  AlkPhos  85  08-08    LIVER FUNCTIONS - ( 08 Aug 2021 11:27 )  Alb: 4.5 g/dL / Pro: 7.4 g/dL / ALK PHOS: 85 U/L / ALT: 11 U/L / AST: 13 U/L / GGT: x                       TELEMETRY:     EKG:     IMAGING:           Hwoie Guajardo, MS4  Pager [phone]    CHIEF COMPLAINT:  Patient is a 50y old  Female who presents with a chief complaint of Diarrhea; SUREKHA       INTERVAL HISTORY/OVERNIGHT EVENTS:    No acute events overnight.  Patient came in for 4 day of watery, mucus diarrhea (6 times per day).  Was also having nausea and NBNB vomiting and acid reflux.  Today reports no nausea or vomiting.  Did have more diarrhea today that was mostly green mucus.  Still having some mild heartburn as well.        REVIEW OF SYSTEMS:    General: no fevers, +chills   Pulm: no cough or SOB  CV: +heart burn  GI: no nausea or vomiting +mucus diarrhea, no abdominal pain      MEDICATIONS:  MEDICATIONS  (STANDING):  calcium carbonate 1250 mG  + Vitamin D (OsCal 500 + D) 1 Tablet(s) Oral <User Schedule>  carvedilol 25 milliGRAM(s) Oral every 12 hours  dextrose 40% Gel 15 Gram(s) Oral once  dextrose 50% Injectable 25 Gram(s) IV Push once  dextrose 50% Injectable 12.5 Gram(s) IV Push once  dextrose 50% Injectable 25 Gram(s) IV Push once  glucagon  Injectable 1 milliGRAM(s) IntraMuscular once  heparin   Injectable 5000 Unit(s) SubCutaneous every 8 hours  insulin glargine Injectable (LANTUS) 14 Unit(s) SubCutaneous at bedtime  insulin lispro (ADMELOG) corrective regimen sliding scale   SubCutaneous three times a day before meals  insulin lispro (ADMELOG) corrective regimen sliding scale   SubCutaneous at bedtime  levothyroxine 150 MICROGram(s) Oral <User Schedule>  levothyroxine 175 MICROGram(s) Oral <User Schedule>  pantoprazole    Tablet 40 milliGRAM(s) Oral before breakfast  sodium chloride 0.9%. 1000 milliLiter(s) (90 mL/Hr) IV Continuous <Continuous>    MEDICATIONS  (PRN):  acetaminophen   Tablet .. 650 milliGRAM(s) Oral every 6 hours PRN Mild Pain (1 - 3), Moderate Pain (4 - 6)  aluminum hydroxide/magnesium hydroxide/simethicone Suspension 30 milliLiter(s) Oral every 6 hours PRN Dyspepsia      ALLERGIES:  Allergies    lisinopril (Unknown)  sulfa drugs (Rash)    Intolerances    hydrochlorothiazide (Unknown)      OBJECTIVE:  Vital Signs Last 24 Hrs  T(C): 36.6 (09 Aug 2021 04:28), Max: 36.9 (08 Aug 2021 10:20)  T(F): 97.9 (09 Aug 2021 04:28), Max: 98.5 (08 Aug 2021 10:20)  HR: 68 (09 Aug 2021 04:28) (68 - 86)  BP: 121/57 (09 Aug 2021 04:28) (112/51 - 154/78)  RR: 18 (09 Aug 2021 04:28) (16 - 18)  SpO2: 100% (09 Aug 2021 04:28) (100% - 100%)          POCT Blood Glucose.: 140 mg/dL (09 Aug 2021 07:40)  POCT Blood Glucose.: 207 mg/dL (08 Aug 2021 21:36)  POCT Blood Glucose.: 132 mg/dL (08 Aug 2021 18:23)  POCT Blood Glucose.: 212 mg/dL (08 Aug 2021 10:25)    CAPILLARY BLOOD GLUCOSE      POCT Blood Glucose.: 140 mg/dL (09 Aug 2021 07:40)    I&O's Summary    Daily Height in cm: 165.1 (08 Aug 2021 22:52)    Daily     PHYSICAL EXAMINATION:  General: well appearing woman in no acute distress   Neurology: A&Ox3  Respiratory: lungs clear to ascultation bilaterally, no increased work of breathing   CV: RRR, S1S2, no murmurs, rubs or gallops  Abdominal: Soft, NT, ND +BS, Last BM today   Extremities: No edema, + peripheral pulses    LABS:                          11.5   9.64  )-----------( 284      ( 09 Aug 2021 06:54 )             35.7     08-09    129<L>  |  99  |  31<H>  ----------------------------<  118<H>  4.2   |  16<L>  |  1.87<H>    Ca    7.8<L>      09 Aug 2021 06:54  Phos  1.9     08-09  Mg     2.20     08-09    TPro  7.4  /  Alb  4.5  /  TBili  0.4  /  DBili  x   /  AST  13  /  ALT  11  /  AlkPhos  85  08-08    LIVER FUNCTIONS - ( 08 Aug 2021 11:27 )  Alb: 4.5 g/dL / Pro: 7.4 g/dL / ALK PHOS: 85 U/L / ALT: 11 U/L / AST: 13 U/L / GGT: x                        Howie Guajardo, MS4  Pager [phone]    CHIEF COMPLAINT:  Patient is a 50y old  Female who presents with a chief complaint of Diarrhea; SUREKHA       INTERVAL HISTORY/OVERNIGHT EVENTS:    No acute events overnight.  Patient came in for 4 day of watery, mucus diarrhea (6 times per day).  Was also having nausea and NBNB vomiting and acid reflux.  Today reports no nausea or vomiting.  Did have more diarrhea today that was mostly green mucus.  Still having some mild heartburn as well.        REVIEW OF SYSTEMS:    General: no fevers, +chills   Pulm: no cough or SOB  CV: +heart burn  GI: no nausea or vomiting +mucus diarrhea, no abdominal pain      MEDICATIONS:  MEDICATIONS  (STANDING):  calcium carbonate 1250 mG  + Vitamin D (OsCal 500 + D) 1 Tablet(s) Oral <User Schedule>  carvedilol 25 milliGRAM(s) Oral every 12 hours  dextrose 40% Gel 15 Gram(s) Oral once  dextrose 50% Injectable 25 Gram(s) IV Push once  dextrose 50% Injectable 12.5 Gram(s) IV Push once  dextrose 50% Injectable 25 Gram(s) IV Push once  glucagon  Injectable 1 milliGRAM(s) IntraMuscular once  heparin   Injectable 5000 Unit(s) SubCutaneous every 8 hours  insulin glargine Injectable (LANTUS) 14 Unit(s) SubCutaneous at bedtime  insulin lispro (ADMELOG) corrective regimen sliding scale   SubCutaneous three times a day before meals  insulin lispro (ADMELOG) corrective regimen sliding scale   SubCutaneous at bedtime  levothyroxine 150 MICROGram(s) Oral <User Schedule>  levothyroxine 175 MICROGram(s) Oral <User Schedule>  pantoprazole    Tablet 40 milliGRAM(s) Oral before breakfast  sodium chloride 0.9%. 1000 milliLiter(s) (90 mL/Hr) IV Continuous <Continuous>    MEDICATIONS  (PRN):  acetaminophen   Tablet .. 650 milliGRAM(s) Oral every 6 hours PRN Mild Pain (1 - 3), Moderate Pain (4 - 6)  aluminum hydroxide/magnesium hydroxide/simethicone Suspension 30 milliLiter(s) Oral every 6 hours PRN Dyspepsia      ALLERGIES:  Allergies    lisinopril (Unknown)  sulfa drugs (Rash)    Intolerances    hydrochlorothiazide (Unknown)      OBJECTIVE:  Vital Signs Last 24 Hrs  T(C): 36.6 (09 Aug 2021 04:28), Max: 36.9 (08 Aug 2021 10:20)  T(F): 97.9 (09 Aug 2021 04:28), Max: 98.5 (08 Aug 2021 10:20)  HR: 68 (09 Aug 2021 04:28) (68 - 86)  BP: 121/57 (09 Aug 2021 04:28) (112/51 - 154/78)  RR: 18 (09 Aug 2021 04:28) (16 - 18)  SpO2: 100% (09 Aug 2021 04:28) (100% - 100%)          POCT Blood Glucose.: 140 mg/dL (09 Aug 2021 07:40)  POCT Blood Glucose.: 207 mg/dL (08 Aug 2021 21:36)  POCT Blood Glucose.: 132 mg/dL (08 Aug 2021 18:23)  POCT Blood Glucose.: 212 mg/dL (08 Aug 2021 10:25)    CAPILLARY BLOOD GLUCOSE      POCT Blood Glucose.: 140 mg/dL (09 Aug 2021 07:40)    I&O's Summary    Daily Height in cm: 165.1 (08 Aug 2021 22:52)    Daily     PHYSICAL EXAMINATION:  General: well appearing woman in no acute distress   Neurology: A&Ox3  Respiratory: lungs clear to ascultation bilaterally, no increased work of breathing   CV: RRR, S1S2, no murmurs, rubs or gallops  Abdominal: Soft, NT, ND +BS, Last BM today   Extremities: No edema, + peripheral pulses    LABS:                          11.5   9.64  )-----------( 284      ( 09 Aug 2021 06:54 )             35.7     08-09    129<L>  |  99  |  31<H>  ----------------------------<  118<H>  4.2   |  16<L>  |  1.87<H>    Ca    7.8<L>      09 Aug 2021 06:54  Phos  1.9     08-09  Mg     2.20     08-09    TPro  7.4  /  Alb  4.5  /  TBili  0.4  /  DBili  x   /  AST  13  /  ALT  11  /  AlkPhos  85  08-08    LIVER FUNCTIONS - ( 08 Aug 2021 11:27 )  Alb: 4.5 g/dL / Pro: 7.4 g/dL / ALK PHOS: 85 U/L / ALT: 11 U/L / AST: 13 U/L / GGT: x

## 2021-08-09 NOTE — PROGRESS NOTE ADULT - PROBLEM SELECTOR PLAN 4
-On home losartan 100mg QD, Carvedilol 25mg BID, Chlorthalidone 12.5mg QD  -Hold off on losartan and chlorthalidone at this time given SUREKHA  -C/w coreg 25mg BID   -Monitor BP -On basaglar 18 units at bedtime, glipizide 10mg BID, Janumet 50/500mg BID  -Hold PO DM agents  -c/w lantus 14units at bedtime  -c/w ISS  -Monitor fingersticks

## 2021-08-09 NOTE — PROGRESS NOTE ADULT - PROBLEM SELECTOR PLAN 5
-On basaglar 18 units at bedtime, glipizide 10mg BID, Janumet 50/500mg BID  -Hold PO DM agents  -c/w lantus 14units at bedtime  -c/w ISS  -Monitor fingersticks -On home losartan 100mg QD, Carvedilol 25mg BID, Chlorthalidone 12.5mg QD  -Hold off on losartan and chlorthalidone at this time given SUREKHA  -C/w coreg 25mg BID   -Monitor BP

## 2021-08-10 ENCOUNTER — APPOINTMENT (OUTPATIENT)
Dept: INTERNAL MEDICINE | Facility: CLINIC | Age: 51
End: 2021-08-10

## 2021-08-10 ENCOUNTER — TRANSCRIPTION ENCOUNTER (OUTPATIENT)
Age: 51
End: 2021-08-10

## 2021-08-10 VITALS — WEIGHT: 192.9 LBS

## 2021-08-10 LAB
A1C WITH ESTIMATED AVERAGE GLUCOSE RESULT: 8.9 % — HIGH (ref 4–5.6)
ANION GAP SERPL CALC-SCNC: 16 MMOL/L — HIGH (ref 7–14)
BUN SERPL-MCNC: 25 MG/DL — HIGH (ref 7–23)
CALCIUM SERPL-MCNC: 8.4 MG/DL — SIGNIFICANT CHANGE UP (ref 8.4–10.5)
CHLORIDE SERPL-SCNC: 105 MMOL/L — SIGNIFICANT CHANGE UP (ref 98–107)
CO2 SERPL-SCNC: 17 MMOL/L — LOW (ref 22–31)
CREAT SERPL-MCNC: 1.35 MG/DL — HIGH (ref 0.5–1.3)
ESTIMATED AVERAGE GLUCOSE: 209 — SIGNIFICANT CHANGE UP
GLUCOSE BLDC GLUCOMTR-MCNC: 193 MG/DL — HIGH (ref 70–99)
GLUCOSE BLDC GLUCOMTR-MCNC: 205 MG/DL — HIGH (ref 70–99)
GLUCOSE SERPL-MCNC: 139 MG/DL — HIGH (ref 70–99)
MAGNESIUM SERPL-MCNC: 2.1 MG/DL — SIGNIFICANT CHANGE UP (ref 1.6–2.6)
PHOSPHATE SERPL-MCNC: 3.2 MG/DL — SIGNIFICANT CHANGE UP (ref 2.5–4.5)
POTASSIUM SERPL-MCNC: 3.6 MMOL/L — SIGNIFICANT CHANGE UP (ref 3.5–5.3)
POTASSIUM SERPL-SCNC: 3.6 MMOL/L — SIGNIFICANT CHANGE UP (ref 3.5–5.3)
SODIUM SERPL-SCNC: 138 MMOL/L — SIGNIFICANT CHANGE UP (ref 135–145)

## 2021-08-10 PROCEDURE — 99232 SBSQ HOSP IP/OBS MODERATE 35: CPT | Mod: GC

## 2021-08-10 PROCEDURE — 93010 ELECTROCARDIOGRAM REPORT: CPT

## 2021-08-10 RX ORDER — FAMOTIDINE 10 MG/ML
1 INJECTION INTRAVENOUS
Qty: 30 | Refills: 0
Start: 2021-08-10 | End: 2021-09-08

## 2021-08-10 RX ORDER — CHLORTHALIDONE 50 MG
0.5 TABLET ORAL
Qty: 0 | Refills: 0 | DISCHARGE

## 2021-08-10 RX ORDER — LOSARTAN POTASSIUM 100 MG/1
1 TABLET, FILM COATED ORAL
Qty: 0 | Refills: 0 | DISCHARGE

## 2021-08-10 RX ADMIN — Medication 1: at 09:02

## 2021-08-10 RX ADMIN — Medication 150 MICROGRAM(S): at 06:46

## 2021-08-10 RX ADMIN — Medication 2: at 13:02

## 2021-08-10 RX ADMIN — PANTOPRAZOLE SODIUM 40 MILLIGRAM(S): 20 TABLET, DELAYED RELEASE ORAL at 06:46

## 2021-08-10 NOTE — PROGRESS NOTE ADULT - SUBJECTIVE AND OBJECTIVE BOX
Howie Casandra, MS4  Pager [phone]    CHIEF COMPLAINT:  Patient is a 50y old  Female who presents with a chief complaint of Diarrhea; SUREKHA       INTERVAL HISTORY/OVERNIGHT EVENTS:    No acute events overnight.  Patient is feeling well this morning.  She reports having no diarrhea or any BM since 8/8.  She denies any abdominal pain, nausea or vomiting.  She is still having some mild burning pain in the throat that she contributes to heart burn.       REVIEW OF SYSTEMS:    General: no fevers, +chills  HEENT: +mild burning throat pain  Pulm: no cough or SOB  CV: no chest pain  GI: no nausea, vomiting, diarrhea, or abdominal pain  MSK: +heavy feeling in lower back (says she has this at home)    MEDICATIONS:  MEDICATIONS  (STANDING):  calcium carbonate 1250 mG  + Vitamin D (OsCal 500 + D) 1 Tablet(s) Oral <User Schedule>  carvedilol 25 milliGRAM(s) Oral every 12 hours  dextrose 40% Gel 15 Gram(s) Oral once  dextrose 50% Injectable 25 Gram(s) IV Push once  dextrose 50% Injectable 12.5 Gram(s) IV Push once  dextrose 50% Injectable 25 Gram(s) IV Push once  famotidine    Tablet 20 milliGRAM(s) Oral daily  glucagon  Injectable 1 milliGRAM(s) IntraMuscular once  heparin   Injectable 5000 Unit(s) SubCutaneous every 8 hours  insulin glargine Injectable (LANTUS) 14 Unit(s) SubCutaneous at bedtime  insulin lispro (ADMELOG) corrective regimen sliding scale   SubCutaneous three times a day before meals  insulin lispro (ADMELOG) corrective regimen sliding scale   SubCutaneous at bedtime  levothyroxine 150 MICROGram(s) Oral <User Schedule>  levothyroxine 175 MICROGram(s) Oral <User Schedule>  pantoprazole    Tablet 40 milliGRAM(s) Oral before breakfast  sodium chloride 0.9%. 1000 milliLiter(s) (90 mL/Hr) IV Continuous <Continuous>    MEDICATIONS  (PRN):  acetaminophen   Tablet .. 650 milliGRAM(s) Oral every 6 hours PRN Mild Pain (1 - 3), Moderate Pain (4 - 6)  aluminum hydroxide/magnesium hydroxide/simethicone Suspension 30 milliLiter(s) Oral every 6 hours PRN Dyspepsia      ALLERGIES:  Allergies    lisinopril (Unknown)  sulfa drugs (Rash)    Intolerances    hydrochlorothiazide (Unknown)      OBJECTIVE:  Vital Signs Last 24 Hrs  T(C): 36.7 (10 Aug 2021 06:40), Max: 36.7 (09 Aug 2021 12:18)  T(F): 98 (10 Aug 2021 06:40), Max: 98.1 (09 Aug 2021 12:18)  HR: 69 (10 Aug 2021 06:40) (69 - 79)  BP: 132/64 (10 Aug 2021 06:40) (115/45 - 154/68)  RR: 16 (10 Aug 2021 06:40) (16 - 18)  SpO2: 100% (10 Aug 2021 06:40) (100% - 100%)        POCT Blood Glucose.: 187 mg/dL (09 Aug 2021 21:52)    I&O's Summary    Daily     Daily     PHYSICAL EXAMINATION:  General: Well appearing woman in no apparent distress   Neurology: A&Ox3  Respiratory: lungs clear to ascultation bilaterally, no increased work of breathing   CV: RRR, S1S2, no murmurs, rubs or gallops  Abdominal: Soft, NT, ND +BS, Last BM 8/8      LABS:                          11.5   9.64  )-----------( 284      ( 09 Aug 2021 06:54 )             35.7     08-09    129<L>  |  99  |  31<H>  ----------------------------<  118<H>  4.2   |  16<L>  |  1.87<H>    Ca    7.8<L>      09 Aug 2021 06:54  Phos  1.9     08-09  Mg     2.20     08-09    TPro  7.4  /  Alb  4.5  /  TBili  0.4  /  DBili  x   /  AST  13  /  ALT  11  /  AlkPhos  85  08-08    LIVER FUNCTIONS - ( 08 Aug 2021 11:27 )  Alb: 4.5 g/dL / Pro: 7.4 g/dL / ALK PHOS: 85 U/L / ALT: 11 U/L / AST: 13 U/L / GGT: x

## 2021-08-10 NOTE — DIETITIAN INITIAL EVALUATION ADULT. - OTHER INFO
Patient is a 50 year old woman with a history of insulin controlled diabetes, HTN, GERD, hx thyroidectomy and appendectomy here for 4 days of watery, mucus filled diarrhea found to have an SUREKHA and hyponatremia.    Pt admitted with diarrhea, no further lose bowel movement reported, and is negative for CDiff. Pt currently with good po intakes. Denies chewing, swallowing difficulties or any nausea, vomiting. Pt requesting Diabetes education for better glycemic control. Reports to taking multiple oral medications at home as well as Basaglar. No HgA1C result in this admit, last obtained per Pt was in 6/21 at 8.3%. Check fasting glucose, usually ranges ~150-170 mg/dL.  Discussed at length importance of glycemic control to prevent long term micro/macrovascular complications. Encouraged outpatient Endocrine follow up, and Pt may benefit from CGM. Educated on CHO sources, portion controlled meals, and including unsweetened beverages. Suggested low na meal prep at home with herbs/spices to season foods in place of salt. Pt with good understanding, asked appropriate follow up questions. Patient is a 50 year old woman with a history of insulin controlled diabetes, HTN, GERD, hx thyroidectomy and appendectomy here for 4 days of watery, mucus filled diarrhea found to have an SUREKHA and hyponatremia.    Pt admitted with diarrhea, no further lose bowel movement reported, and is negative for CDiff. Pt currently with good po intakes. Denies chewing, swallowing difficulties or any nausea, vomiting. Pt requesting Diabetes education for better glycemic control. Reports to taking multiple oral medications at home as well as Basaglar. No HgA1C result in this admit, last obtained per Pt was in 6/21 at 8.3%. Checks fasting glucose, usually ranges ~150-170 mg/dL.  Discussed at length importance of glycemic control to prevent long term micro/macrovascular complications. Encouraged outpatient Endocrine follow up, and Pt may benefit from CGM. Educated on CHO sources, portion controlled meals, and including unsweetened beverages. Suggested low na meal prep at home with herbs/spices to season foods in place of salt. Pt with good understanding, asked appropriate follow up questions.

## 2021-08-10 NOTE — PROGRESS NOTE ADULT - PROBLEM SELECTOR PLAN 1
Pt presenting w/ diarrhea, nausea and vomiting. Viral gastroenteritis most likely given afebrile and mild leukocytosis (now resolved).  Bacterial colitis less likely given no recent travel and negative stool PCR.  C diff. less likely given afebrile and no leukocytosis and negative C diff PCR, IBD less likely given patient's age and no family history of IBD  -c/w IVF NS 90 cc/hr x 24 hours  -F/u Stool culture  -C diff PCR negative   -GI PCR negative   -Monitor off abx Pt presenting w/ diarrhea, nausea and vomiting. Viral gastroenteritis most likely given afebrile and mild leukocytosis (now resolved).  Bacterial colitis less likely given no recent travel and negative stool PCR.  C diff. less likely given afebrile and no leukocytosis and negative C diff PCR, IBD less likely given patient's age and no family history of IBD  -c/w IVF NS 90 cc/hr x 24 hours  -Stool culture neg thus far, final cx results pending  -C diff PCR negative   -GI PCR negative   -Monitor off abx

## 2021-08-10 NOTE — PROGRESS NOTE ADULT - PROBLEM SELECTOR PLAN 3
Likely hypovolemic hyponatremia given diarrhea  -s/p IVF bolus 1L  -C/w IVF NS @ 90cc / hour  -hyponatremia improving now 129 from 122   -urine osmolality 259, urine sodium 46, fits with hypovolemic hyponatremia picture   -Monitor BMPs daily  -Avoid overcorrection , 6-8mEQ/24 hours Likely hypovolemic hyponatremia given diarrhea  -s/p IVF bolus 1L  -C/w IVF NS @ 90cc / hour  -hyponatremia improved now,  138 from 122   -urine osmolality 259, urine sodium 46, fits with hypovolemic hyponatremia picture   -Monitor BMPs daily  -Avoid overcorrection , 6-8mEQ/24 hours

## 2021-08-10 NOTE — DISCHARGE NOTE NURSING/CASE MANAGEMENT/SOCIAL WORK - PATIENT PORTAL LINK FT
You can access the FollowMyHealth Patient Portal offered by [facility] by registering at the following website: [email or web address]. By joining [employer]’s FollowMyHealth portal, you will also be able to view your health information using other applications (apps) compatible with our system.

## 2021-08-10 NOTE — DISCHARGE NOTE PROVIDER - NSDCFUSCHEDAPPT_GEN_ALL_CORE_FT
GUS PARDO ; 08/10/2021 ; NPP Med GenInt [address] [facility]  GUS PARDO ; 08/16/2021 ; NPP Surgonc [address] [facility]  GUS PARDO ; 08/30/2021 ; NPP Cardio [address] [facility] GUS PARDO ; 08/13/2021 ; NPP Med GenInt [address] [facility]  GUS PARDO ; 08/16/2021 ; NPP Surgonc [address] [facility]  GUS PARDO ; 08/16/2021 ; NPP Med GenInt [address] [facility]  GUS PARDO ; 08/30/2021 ; NPP Cardio [address] [facility]

## 2021-08-10 NOTE — DISCHARGE NOTE PROVIDER - NSDCMRMEDTOKEN_GEN_ALL_CORE_FT
Haileaglhector PrescottPen 100 units/mL subcutaneous solution: 18 unit(s) subcutaneous once a day (at bedtime)  Calcium 500+D oral tablet, chewable: 1 tab(s) orally once a week  carvedilol 25 mg oral tablet: 1 tab(s) orally 2 times a day  chlorthalidone 25 mg oral tablet: 0.5 tab(s) orally once a day  glipiZIDE 10 mg oral tablet: 1 tab(s) orally 2 times a day (before meals)  Janumet 50 mg-500 mg oral tablet: 1 tab(s) orally 2 times a day  levothyroxine 150 mcg (0.15 mg) oral tablet: 1 tab(s) orally once a day for 6 days a week (Mon-Sat)  levothyroxine 175 mcg (0.175 mg) oral tablet: 1 tab(s) orally once a week (Sun)  losartan 100 mg oral tablet: 1 tab(s) orally once a day  NexIUM 20 mg oral delayed release capsule: 1 cap(s) orally once a day   aluminum hydroxide-magnesium hydroxide 200 mg-200 mg/5 mL oral suspension: 30 milliliter(s) orally every 6 hours, As needed, Dyspepsia  Basaglar KwikPen 100 units/mL subcutaneous solution: 18 unit(s) subcutaneous once a day (at bedtime)  Calcium 500+D oral tablet, chewable: 1 tab(s) orally once a week  carvedilol 25 mg oral tablet: 1 tab(s) orally 2 times a day  glipiZIDE 10 mg oral tablet: 1 tab(s) orally 2 times a day (before meals)  Janumet 50 mg-500 mg oral tablet: 1 tab(s) orally 2 times a day  levothyroxine 150 mcg (0.15 mg) oral tablet: 1 tab(s) orally once a day for 6 days a week (Mon-Sat)  levothyroxine 175 mcg (0.175 mg) oral tablet: 1 tab(s) orally once a week (Sun)  NexIUM 20 mg oral delayed release capsule: 1 cap(s) orally once a day  Pepcid AC Maximum Strength 20 mg oral tablet: 1 tab(s) orally once a day

## 2021-08-10 NOTE — PROGRESS NOTE ADULT - PROBLEM SELECTOR PLAN 5
-On home losartan 100mg QD, Carvedilol 25mg BID, Chlorthalidone 12.5mg QD  -Hold off on losartan and chlorthalidone at this time given SUREKHA  -C/w coreg 25mg BID   -Monitor BP

## 2021-08-10 NOTE — DIETITIAN INITIAL EVALUATION ADULT. - PERTINENT MEDS FT
MEDICATIONS  (STANDING):  calcium carbonate 1250 mG  + Vitamin D (OsCal 500 + D) 1 Tablet(s) Oral <User Schedule>  carvedilol 25 milliGRAM(s) Oral every 12 hours  dextrose 40% Gel 15 Gram(s) Oral once  dextrose 50% Injectable 25 Gram(s) IV Push once  dextrose 50% Injectable 12.5 Gram(s) IV Push once  dextrose 50% Injectable 25 Gram(s) IV Push once  famotidine    Tablet 20 milliGRAM(s) Oral daily  glucagon  Injectable 1 milliGRAM(s) IntraMuscular once  heparin   Injectable 5000 Unit(s) SubCutaneous every 8 hours  insulin glargine Injectable (LANTUS) 14 Unit(s) SubCutaneous at bedtime  insulin lispro (ADMELOG) corrective regimen sliding scale   SubCutaneous three times a day before meals  insulin lispro (ADMELOG) corrective regimen sliding scale   SubCutaneous at bedtime  levothyroxine 150 MICROGram(s) Oral <User Schedule>  levothyroxine 175 MICROGram(s) Oral <User Schedule>  pantoprazole    Tablet 40 milliGRAM(s) Oral before breakfast

## 2021-08-10 NOTE — PROGRESS NOTE ADULT - PROBLEM SELECTOR PLAN 6
-On nexium at home  -will hold for now, resume pantoprazole 40mg QD -On nexium at home, doesn't want to continue   -C/w pantoprazole 40mg QD  -C/w Pepcid 20mg -On nexium at home, doesn't want to continue   -C/w pantoprazole 40mg QD  -C/w Pepcid 20mg  -will obtain ECG to make sure GERD-like symptoms are from GERD and not a cardiac etiology

## 2021-08-10 NOTE — DISCHARGE NOTE PROVIDER - NSFOLLOWUPCLINICS_GEN_ALL_ED_FT
[facility] End[facility]  Endocrinology  [address] [place] [address]  Phone: [phone]  Fax:

## 2021-08-10 NOTE — PROGRESS NOTE ADULT - ASSESSMENT
Patient is a 50 year old woman with a history of insulin controlled diabetes, HTN, GERD, hx thyroidectomy and appendectomy here for 4 days of watery, mucus filled diarrhea found to have an SUREKHA and hyponatremia.
Patient is a 50 year old woman with a history of insulin controlled diabetes, HTN, GERD, hx thyroidectomy and appendectomy here for 4 days of watery, mucus filled diarrhea found to have an SUREKHA and hyponatremia.

## 2021-08-10 NOTE — DISCHARGE NOTE PROVIDER - NSDCCPCAREPLAN_GEN_ALL_CORE_FT
PRINCIPAL DISCHARGE DIAGNOSIS  Diagnosis: Diarrhea  Assessment and Plan of Treatment: You presented to the hospital with nausea/vomiting and diarrhea likely 2/2 a viral stool infection. You were managed with supportive care while you were in the hospital. Your diarrhea is now resolved and you im      SECONDARY DISCHARGE DIAGNOSES  Diagnosis: SUREKHA (acute kidney injury)  Assessment and Plan of Treatment:     Diagnosis: Hyponatremia  Assessment and Plan of Treatment:      PRINCIPAL DISCHARGE DIAGNOSIS  Diagnosis: Diarrhea  Assessment and Plan of Treatment: You presented to the hospital with nausea/vomiting and diarrhea likely 2/2 a viral stool infection. You were managed with supportive care while you were in the hospital. Your diarrhea is now resolved and you improved clinically during your visit. You are now stable for discharge. Please follow up with your PCP following your discharge for further management. Please return to the ED should you experience any worsening symptoms.      SECONDARY DISCHARGE DIAGNOSES  Diagnosis: SUREKHA (acute kidney injury)  Assessment and Plan of Treatment: You were found to have a kidney injury during your hospital admission. This is likely due to volume loss from your diarrhea. You were given IV fluids during your hospital course and your kidney levels improved. Your antihypertensive medications (losartan and chlotharlidone) were held during your admission due to ongoing kidney injury. Please follow up with your primary care doctor for recommendations regarding restarting these medications. Please return to the ED should you experience any worsening symptoms.

## 2021-08-10 NOTE — PROGRESS NOTE ADULT - PROBLEM SELECTOR PLAN 2
-Likely pre-renal given profuse diarrhea  -sCr 2.47 on admission, baseline 1.13 (October 2020)  -SCr now 1.87  -Obtain urine lytes to calculate FeNa  -s/p IVF bolus, c/w IVF   -Avoid nephrotoxic agents  -Renally dose meds
-Likely pre-renal given profuse diarrhea  -sCr 2.47 on admission, baseline 1.13 (October 2020)  -SCr now 1.87  -urine osmolality 259, urine sodium 46, fits with hypovolemic SUREKHA picture   -s/p IVF bolus, c/w IVF   -Avoid nephrotoxic agents  -Renally dose meds

## 2021-08-10 NOTE — DIETITIAN INITIAL EVALUATION ADULT. - PERTINENT LABORATORY DATA
08-10 @ 08:39: Na 138, BUN 25<H>, Cr 1.35<H>, <H>     CAPILLARY BLOOD GLUCOSE  POCT Blood Glucose.: 193 mg/dL (10 Aug 2021 08:59)  POCT Blood Glucose.: 187 mg/dL (09 Aug 2021 21:52)  POCT Blood Glucose.: 160 mg/dL (09 Aug 2021 17:56)  POCT Blood Glucose.: 177 mg/dL (09 Aug 2021 11:43)

## 2021-08-10 NOTE — PROGRESS NOTE ADULT - ATTENDING COMMENTS
50 year old woman with a history of insulin controlled diabetes, HTN, GERD, hx thyroidectomy and appendectomy p/w hyponatremia and SUREKHA in the setting of hypovolemia 2/2 likely viral diarrhea    #Diarrhea: Pt with multiple episodes of green stool/discharge, last on Sunday. No further episodes since. GI PCR, stool cx and c diff neg thus far. Monitoring off abx for now given lower suspicion for bacterial infx   #SUREKHA: Likely pre-renal etiology 2/2 hypovolemia in the setting of diarrhea. Pt reports her baseline Cr is 1.3, Cr 1.35 so back to baseline. Holding ACE-I and diuretic for now. Will have pt follow up with PCP monday (appt made) prior to restarting ARB.   #hyponatemia: likely hypovolemic hyponatremia 2/2 diarrhea. Now resolved.    #Diabetes: Continue with basal insulin, low ISS premeals. Diabetic teaching provided   #GERD: pt reports chronic PPI use, would like to taper off due to side effect profile. Will start on famotidine. EKG performed to r/o cardiac etiology for discomfort: EKG NSR no acute ST changes  #Dispo: home without skilled needs. Plan to d/c home today with PCP followup Monday
50 year old woman with a history of insulin controlled diabetes, HTN, GERD, hx thyroidectomy and appendectomy here for 4 days of watery, mucus filled diarrhea found to have an SUREKHA and hyponatremia.    #Diarrhea: Pt with multiple episodes of green stool/discharge. Today reports 1 episode of soft stool. Pending GI PCR, stool O&P, c diff. Monitoring off abx for now given lower suspicion for bacterial infx   #SUREKHA: Likely pre-renal etiology 2/2 hypovolemia in the setting of diarrhea. Improving with IVF. Trend Cr. Renally dose medications. Holding ACE-I and diuretic for now   #hyponatemia: likely hypovolemic hyponatremia 2/2 diarrhea. On IVF, Na improving. Monitor daily   #Diabetes: DM1 vs 2? Will need to clarify but likely type 2. Continue with basal insulin, low ISS premeals. Diabetic teaching, nutrition eval  #GERD: pt reports chronic PPI use, would like to taper off due to side effect profile. Will start on famotidine  #Dispo: home without skilled needs

## 2021-08-10 NOTE — DISCHARGE NOTE PROVIDER - CARE PROVIDER_API CALL
Kenya Granados)  Medicine  [facility]  [address], [address]  [place] [address]  Phone: [phone]  Fax: [phone]  Scheduled Appointment: 08/16/2021 04:40 PM

## 2021-08-10 NOTE — PROGRESS NOTE ADULT - PROBLEM SELECTOR PLAN 4
-On basaglar 18 units at bedtime, glipizide 10mg BID, Janumet 50/500mg BID  -Hold PO DM agents  -c/w lantus 14units at bedtime  -c/w ISS  -Monitor fingersticks -On basaglar 18 units at bedtime, glipizide 10mg BID, Janumet 50/500mg BID  -Hold PO DM agents  -c/w lantus 14units at bedtime  -c/w ISS  -Monitor fingersticks  -A1c ordered  -wants to follow up with [facility] endocrinology outpatient

## 2021-08-10 NOTE — DISCHARGE NOTE PROVIDER - HOSPITAL COURSE
50yF w/pmhx DM (on long acting insulin), HTN, GERD, hx thyroidectomy, appendectomy presenting with diarrhea, nausea and vomiting admitted for gastroenteritis. Likely viral. C-Diff/ GI PCR , stool culture negative. Patient improved clinically during hospital course with resolution of diarrhea. Also found to have SUREKHA and hyponatremia likely iso diarrhea, improved on admission. Pt is now hemodynamically stable for discharge to follow up with PCP outpatient. 50yF w/pmhx DM (on long acting insulin), HTN, GERD, hx thyroidectomy, appendectomy presenting with diarrhea, nausea and vomiting admitted for gastroenteritis. Likely viral. C-Diff/ GI PCR , stool culture negative. Patient improved clinically during hospital course with resolution of diarrhea. Also found to have SUREKHA and hyponatremia likely iso diarrhea, improved on admission. Pt is now hemodynamically stable for discharge to follow up with PCP outpatient.

## 2021-08-11 LAB
CULTURE RESULTS: SIGNIFICANT CHANGE UP
SPECIMEN SOURCE: SIGNIFICANT CHANGE UP

## 2021-08-12 PROBLEM — R92.8 ABNORMAL FINDING ON BREAST IMAGING: Status: ACTIVE | Noted: 2021-07-15

## 2021-08-12 RX ORDER — ISOPROPYL ALCOHOL 0.75 G/1
SWAB TOPICAL
Qty: 100 | Refills: 0 | Status: ACTIVE | COMMUNITY
Start: 2020-08-10

## 2021-08-12 RX ORDER — PEN NEEDLE, DIABETIC 32 GX 1/4"
32G X 6 MM NEEDLE, DISPOSABLE MISCELLANEOUS
Qty: 100 | Refills: 0 | Status: ACTIVE | COMMUNITY
Start: 2021-07-03

## 2021-08-13 ENCOUNTER — APPOINTMENT (OUTPATIENT)
Dept: INTERNAL MEDICINE | Facility: CLINIC | Age: 51
End: 2021-08-13
Payer: MEDICAID

## 2021-08-13 VITALS
WEIGHT: 192 LBS | SYSTOLIC BLOOD PRESSURE: 160 MMHG | OXYGEN SATURATION: 99 % | DIASTOLIC BLOOD PRESSURE: 80 MMHG | HEIGHT: 65 IN | HEART RATE: 74 BPM | BODY MASS INDEX: 31.99 KG/M2

## 2021-08-13 LAB
CULTURE RESULTS: SIGNIFICANT CHANGE UP
SPECIMEN SOURCE: SIGNIFICANT CHANGE UP

## 2021-08-13 PROCEDURE — 99214 OFFICE O/P EST MOD 30 MIN: CPT

## 2021-08-13 RX ORDER — CYCLOBENZAPRINE HYDROCHLORIDE 5 MG/1
5 TABLET, FILM COATED ORAL
Qty: 60 | Refills: 0 | Status: COMPLETED | COMMUNITY
Start: 2020-11-17 | End: 2021-08-13

## 2021-08-13 RX ORDER — ESOMEPRAZOLE MAGNESIUM 20 MG/1
20 CAPSULE, DELAYED RELEASE ORAL
Refills: 0 | Status: COMPLETED | COMMUNITY
End: 2021-08-13

## 2021-08-13 NOTE — REVIEW OF SYSTEMS
[Fever] : no fever [Chills] : no chills [Chest Pain] : no chest pain [Shortness Of Breath] : no shortness of breath [Abdominal Pain] : abdominal pain [Diarrhea] : diarrhea

## 2021-08-13 NOTE — HISTORY OF PRESENT ILLNESS
[FreeTextEntry1] : Review recommendations by breast surgeon [de-identified] : 50F with HTN, T2DM presents to clinic today for follow up of recent breast imaging \par \par Went to ER 8/10/21 and was admitted to [facility] for 3 days for SUREKHA in the setting of diarrhea. Feeling better now but mucousy BM. Tolerating PO but sometimes stomach aches. Reviewed labs on discharge that showed normalized CBC and creatinine at baseline. \par \par BP elevated today, only started carvedilol so far. \par \par C diff and pcr negative.

## 2021-08-13 NOTE — ASSESSMENT
[FreeTextEntry1] : 50F with HTN, uncontrolled T2DM, obesity, hypothyroidism, NAFLD presents to clinic today for follow up of recent breast imaging findings. \par \par 1. Hypertension - better controlled\par -on losartan and carvedilol 25 BID, added chlorthalidone 12.5 mg daily - for now just on carvedilol if cr ok then restart losartan and then diuretic \par -repeat BMP \par \par 2. T2DM\par -a1c (8.8% in April); repeat last visit 8.5%, check again today\par -was following with endocrinologist and currently on: basaglar 18 units, glipizide 10 mg, and janumet  mg bid. she should f/u with me every 3 months at minimum - no glp 1 agonist given hx thyroid cancer. consider jaridance but just recovering from dehydration/too so will hold off but would likely be good idea given CKD as well?\par -endocrine referral given to pt; we have not seen much improvement in A1c\par -based on ACC/AHA guidelines should be on statin; we discussed risks and benefits and started atorvastatin 20 mg but the patient is not ready to start the medication yet. \par -foot exam: normal\par -eye exam: 11/2020; repeat this november\par -urine mircro alb/cr ratio neg in october. on losartan.\par -pneumovax - wants to discuss next time again and promised to have it next visit\par \par 3. Obesity\par -advised f/u with med weight management, regular exercise change in diet\par -discussed risks a/w obesity and benefits of weight loss\par -going to meet with nutritionist \par \par 4. HCM\par -pap smear negative\par -reminded to send in FIT test - provided with new one\par -pt needs breast bx but wants to f/u with surgeon first; referral given to pt and [facility] referral given to patient as well\par -flu vaccine, pneumovax, tdap and shingrix are due however patient declined\par -COVID vaccine completed\par \par RTC in 3 months.

## 2021-08-13 NOTE — PHYSICAL EXAM
[No Respiratory Distress] : no respiratory distress  [No Edema] : there was no peripheral edema [Soft] : abdomen soft [Non Tender] : non-tender [Non-distended] : non-distended [Coordination Grossly Intact] : coordination grossly intact [No Focal Deficits] : no focal deficits [Normal] : affect was normal and insight and judgment were intact

## 2021-08-14 ENCOUNTER — TRANSCRIPTION ENCOUNTER (OUTPATIENT)
Age: 51
End: 2021-08-14

## 2021-08-16 ENCOUNTER — TRANSCRIPTION ENCOUNTER (OUTPATIENT)
Age: 51
End: 2021-08-16

## 2021-08-16 ENCOUNTER — APPOINTMENT (OUTPATIENT)
Dept: SURGICAL ONCOLOGY | Facility: CLINIC | Age: 51
End: 2021-08-16
Payer: MEDICAID

## 2021-08-16 ENCOUNTER — APPOINTMENT (OUTPATIENT)
Dept: INTERNAL MEDICINE | Facility: CLINIC | Age: 51
End: 2021-08-16

## 2021-08-16 VITALS
BODY MASS INDEX: 31.99 KG/M2 | DIASTOLIC BLOOD PRESSURE: 84 MMHG | WEIGHT: 192 LBS | SYSTOLIC BLOOD PRESSURE: 145 MMHG | OXYGEN SATURATION: 96 % | RESPIRATION RATE: 17 BRPM | HEIGHT: 65 IN | HEART RATE: 76 BPM

## 2021-08-16 DIAGNOSIS — R92.8 OTHER ABNORMAL AND INCONCLUSIVE FINDINGS ON DIAGNOSTIC IMAGING OF BREAST: ICD-10-CM

## 2021-08-16 LAB
ALBUMIN SERPL ELPH-MCNC: 4.9 G/DL
ALP BLD-CCNC: 95 U/L
ALT SERPL-CCNC: 16 U/L
ANION GAP SERPL CALC-SCNC: 16 MMOL/L
AST SERPL-CCNC: 13 U/L
BASOPHILS # BLD AUTO: 0.06 K/UL
BASOPHILS NFR BLD AUTO: 0.5 %
BILIRUB SERPL-MCNC: 0.2 MG/DL
BUN SERPL-MCNC: 20 MG/DL
CALCIUM SERPL-MCNC: 10.5 MG/DL
CHLORIDE SERPL-SCNC: 100 MMOL/L
CO2 SERPL-SCNC: 21 MMOL/L
CREAT SERPL-MCNC: 1.34 MG/DL
EOSINOPHIL # BLD AUTO: 0.32 K/UL
EOSINOPHIL NFR BLD AUTO: 2.5 %
ESTIMATED AVERAGE GLUCOSE: 206 MG/DL
FOLATE SERPL-MCNC: 8.9 NG/ML
GLUCOSE SERPL-MCNC: 170 MG/DL
HBA1C MFR BLD HPLC: 8.8 %
HCT VFR BLD CALC: 38 %
HGB BLD-MCNC: 12.3 G/DL
IMM GRANULOCYTES NFR BLD AUTO: 0.6 %
LYMPHOCYTES # BLD AUTO: 3.77 K/UL
LYMPHOCYTES NFR BLD AUTO: 29.5 %
MAN DIFF?: NORMAL
MCHC RBC-ENTMCNC: 28.4 PG
MCHC RBC-ENTMCNC: 32.4 GM/DL
MCV RBC AUTO: 87.8 FL
MONOCYTES # BLD AUTO: 0.65 K/UL
MONOCYTES NFR BLD AUTO: 5.1 %
NEUTROPHILS # BLD AUTO: 7.89 K/UL
NEUTROPHILS NFR BLD AUTO: 61.8 %
PLATELET # BLD AUTO: 337 K/UL
POTASSIUM SERPL-SCNC: 4.8 MMOL/L
PROT SERPL-MCNC: 7.8 G/DL
RBC # BLD: 4.33 M/UL
RBC # FLD: 13.6 %
SODIUM SERPL-SCNC: 137 MMOL/L
TSH SERPL-ACNC: 0.56 UIU/ML
VIT B12 SERPL-MCNC: 446 PG/ML
WBC # FLD AUTO: 12.77 K/UL

## 2021-08-16 PROCEDURE — 99205 OFFICE O/P NEW HI 60 MIN: CPT

## 2021-08-16 PROCEDURE — 99215 OFFICE O/P EST HI 40 MIN: CPT

## 2021-08-18 ENCOUNTER — RESULT REVIEW (OUTPATIENT)
Age: 51
End: 2021-08-18

## 2021-08-23 ENCOUNTER — TRANSCRIPTION ENCOUNTER (OUTPATIENT)
Age: 51
End: 2021-08-23

## 2021-08-27 ENCOUNTER — TRANSCRIPTION ENCOUNTER (OUTPATIENT)
Age: 51
End: 2021-08-27

## 2021-08-29 ENCOUNTER — TRANSCRIPTION ENCOUNTER (OUTPATIENT)
Age: 51
End: 2021-08-29

## 2021-08-29 LAB — HEMOCCULT STL QL IA: NEGATIVE

## 2021-08-30 ENCOUNTER — APPOINTMENT (OUTPATIENT)
Dept: CARDIOLOGY | Facility: CLINIC | Age: 51
End: 2021-08-30
Payer: MEDICAID

## 2021-08-30 VITALS
BODY MASS INDEX: 32.62 KG/M2 | DIASTOLIC BLOOD PRESSURE: 80 MMHG | HEART RATE: 66 BPM | WEIGHT: 196 LBS | SYSTOLIC BLOOD PRESSURE: 120 MMHG | OXYGEN SATURATION: 99 %

## 2021-08-30 PROCEDURE — 99214 OFFICE O/P EST MOD 30 MIN: CPT

## 2021-08-30 PROCEDURE — 36415 COLL VENOUS BLD VENIPUNCTURE: CPT

## 2021-08-30 NOTE — HISTORY OF PRESENT ILLNESS
[FreeTextEntry1] : 50-year-old female with a history of hypertension, hypercholesterolemia, type 2 diabetes.  She developed hyponatremia and in June and was taken off chlorthalidone.  Her blood pressure has been a little elevated at home running about 140/80 on the average.  She continues on carvedilol 25 twice daily and losartan 100.  Her most recent blood work shows an LDL cholesterol of 79 on Lipitor 20 and her A1c was 6.8.  She is feeling generally well.  She is concerned because her recent CBC showed an elevated lymphocyte count.

## 2021-08-31 ENCOUNTER — TRANSCRIPTION ENCOUNTER (OUTPATIENT)
Age: 51
End: 2021-08-31

## 2021-08-31 LAB
ANION GAP SERPL CALC-SCNC: 15 MMOL/L
BASOPHILS # BLD AUTO: 0.06 K/UL
BASOPHILS NFR BLD AUTO: 0.6 %
BUN SERPL-MCNC: 15 MG/DL
CALCIUM SERPL-MCNC: 10.2 MG/DL
CHLORIDE SERPL-SCNC: 100 MMOL/L
CO2 SERPL-SCNC: 22 MMOL/L
CREAT SERPL-MCNC: 1.36 MG/DL
EOSINOPHIL # BLD AUTO: 0.3 K/UL
EOSINOPHIL NFR BLD AUTO: 3.2 %
GLUCOSE SERPL-MCNC: 221 MG/DL
HCT VFR BLD CALC: 38.1 %
HGB BLD-MCNC: 12.3 G/DL
IMM GRANULOCYTES NFR BLD AUTO: 0.4 %
LYMPHOCYTES # BLD AUTO: 2.78 K/UL
LYMPHOCYTES NFR BLD AUTO: 29.2 %
MAN DIFF?: NORMAL
MCHC RBC-ENTMCNC: 28.7 PG
MCHC RBC-ENTMCNC: 32.3 GM/DL
MCV RBC AUTO: 88.8 FL
MONOCYTES # BLD AUTO: 0.6 K/UL
MONOCYTES NFR BLD AUTO: 6.3 %
NEUTROPHILS # BLD AUTO: 5.74 K/UL
NEUTROPHILS NFR BLD AUTO: 60.3 %
PLATELET # BLD AUTO: 293 K/UL
POTASSIUM SERPL-SCNC: 5.3 MMOL/L
RBC # BLD: 4.29 M/UL
RBC # FLD: 13.5 %
SODIUM SERPL-SCNC: 137 MMOL/L
WBC # FLD AUTO: 9.52 K/UL

## 2021-09-02 NOTE — H&P ADULT - PROBLEM SELECTOR PLAN 4
PRINCIPAL DISCHARGE DIAGNOSIS  Diagnosis: Atrial fibrillation with rapid ventricular response  Assessment and Plan of Treatment: Likely your COVID exacerbated your Atrial fibrillation and caused you to have an excellerated Heart rate. Please continue to take your medications as prescribed. Follow up with your electrophysiologist of choice. You may follow up with Dr. Meek Varma at [facility] in the clinic. You will likely need an ablation when your COVID resolves.      SECONDARY DISCHARGE DIAGNOSES  Diagnosis: COVID-19  Assessment and Plan of Treatment: You were diagnosed with COVID-19 on 8/28 and remained positive on 8/31. Continue with supportive care, take cough medicine as needed, Tylenol for pain or fevers, and drink plenty of fluids to keep hydrated. Follow up with your medical doctor within 2 weeks of discharge. Continue to quarantine as directed for 10 days from symptom onset/first positive result, wash your hands frequently, and wear your mask around others. If you experience worsening shortness of breath, difficulty breathing, chest pain, lower extremity pain or swelling, coughing up blood, or persistent fevers, contact your medical doctor or return to the emergency room.    
-On home losartan 100mg QD, Carvedilol 25mg BID, Chlorthalidone 12.5mg QD  -Hold off on losartan and chlorthalidone at this time given SUREKHA  -C/w coreg 25mg BID   -Monitor BP

## 2021-09-07 ENCOUNTER — OUTPATIENT (OUTPATIENT)
Dept: OUTPATIENT SERVICES | Facility: HOSPITAL | Age: 51
LOS: 1 days | End: 2021-09-07
Payer: MEDICAID

## 2021-09-07 ENCOUNTER — APPOINTMENT (OUTPATIENT)
Dept: ULTRASOUND IMAGING | Facility: IMAGING CENTER | Age: 51
End: 2021-09-07
Payer: MEDICAID

## 2021-09-07 ENCOUNTER — RESULT REVIEW (OUTPATIENT)
Age: 51
End: 2021-09-07

## 2021-09-07 DIAGNOSIS — Z00.8 ENCOUNTER FOR OTHER GENERAL EXAMINATION: ICD-10-CM

## 2021-09-07 DIAGNOSIS — Z90.49 ACQUIRED ABSENCE OF OTHER SPECIFIED PARTS OF DIGESTIVE TRACT: Chronic | ICD-10-CM

## 2021-09-07 DIAGNOSIS — Z90.79 ACQUIRED ABSENCE OF OTHER GENITAL ORGAN(S): Chronic | ICD-10-CM

## 2021-09-07 DIAGNOSIS — R92.8 OTHER ABNORMAL AND INCONCLUSIVE FINDINGS ON DIAGNOSTIC IMAGING OF BREAST: ICD-10-CM

## 2021-09-07 DIAGNOSIS — Z90.09 ACQUIRED ABSENCE OF OTHER PART OF HEAD AND NECK: Chronic | ICD-10-CM

## 2021-09-07 PROCEDURE — 76641 ULTRASOUND BREAST COMPLETE: CPT

## 2021-09-07 PROCEDURE — 76641 ULTRASOUND BREAST COMPLETE: CPT | Mod: 26,LT

## 2021-09-10 ENCOUNTER — RESULT REVIEW (OUTPATIENT)
Age: 51
End: 2021-09-10

## 2021-09-10 ENCOUNTER — OUTPATIENT (OUTPATIENT)
Dept: OUTPATIENT SERVICES | Facility: HOSPITAL | Age: 51
LOS: 1 days | End: 2021-09-10
Payer: MEDICAID

## 2021-09-10 ENCOUNTER — APPOINTMENT (OUTPATIENT)
Dept: ULTRASOUND IMAGING | Facility: IMAGING CENTER | Age: 51
End: 2021-09-10
Payer: MEDICAID

## 2021-09-10 DIAGNOSIS — Z90.79 ACQUIRED ABSENCE OF OTHER GENITAL ORGAN(S): Chronic | ICD-10-CM

## 2021-09-10 DIAGNOSIS — Z90.09 ACQUIRED ABSENCE OF OTHER PART OF HEAD AND NECK: Chronic | ICD-10-CM

## 2021-09-10 DIAGNOSIS — Z90.49 ACQUIRED ABSENCE OF OTHER SPECIFIED PARTS OF DIGESTIVE TRACT: Chronic | ICD-10-CM

## 2021-09-10 DIAGNOSIS — Z00.8 ENCOUNTER FOR OTHER GENERAL EXAMINATION: ICD-10-CM

## 2021-09-10 PROCEDURE — 77065 DX MAMMO INCL CAD UNI: CPT

## 2021-09-10 PROCEDURE — 19083 BX BREAST 1ST LESION US IMAG: CPT | Mod: LT

## 2021-09-10 PROCEDURE — 19083 BX BREAST 1ST LESION US IMAG: CPT

## 2021-09-10 PROCEDURE — 88305 TISSUE EXAM BY PATHOLOGIST: CPT | Mod: 26

## 2021-09-10 PROCEDURE — 88305 TISSUE EXAM BY PATHOLOGIST: CPT

## 2021-09-10 PROCEDURE — A4648: CPT

## 2021-09-10 PROCEDURE — 77065 DX MAMMO INCL CAD UNI: CPT | Mod: 26,LT

## 2021-09-13 LAB — SURGICAL PATHOLOGY STUDY: SIGNIFICANT CHANGE UP

## 2021-09-17 ENCOUNTER — TRANSCRIPTION ENCOUNTER (OUTPATIENT)
Age: 51
End: 2021-09-17

## 2021-09-21 NOTE — ASSESSMENT
[FreeTextEntry1] : \par \par \par June 2021 bilateral mammogram and sonogram at [place]: BI-RADS 0.\par \par 6/21/2021:\par Supplemental imaging at [place]:\par 1.  Left breast (4:00) subcentimeter nodule.\par Sono-guided core biopsy with clip placement recommended.\par 2.  Right breast (outer) radiographic abnormality without sonographic correlate.\par If no prior films available for comparison:\par Right stereotactic biopsy recommended.\par \par \par I reviewed the above recommendations, and explained the technique involved with each procedure.\par \par \par The studies were reviewed, but not repeated at [place] with a different interpretation.\par Right: Effaces: No intervention recommended.\par Left nodule appeared unchanged from 2011, amenable to short-term follow-up.\par \par We discussed management options.\par She would like to have a repeat diagnostic/targeted left breast ultrasound (4:00), AND a diagnostic right mammogram before making a decision about how she would like to proceed.\par \par Prescriptions entered.\par \par I submitted her 2011 disc from [facility] for entry into our system\par \par Further management based upon the results of her repeat studies\par \par Reviewed in detail, all questions answered.\par \par Referring physician contacted through secure email.\par \par \par 9/7/2021.\par We spoke.\par 1.  Earlier today she had a diagnostic left breast ultrasound at [facility]: BI-RADS 4.\par Left breast (4:00) 4 x 4 x 3 mm nodule for which core biopsy is recommended.\par She understands and would like to proceed with the biopsy.\par Paperwork entered.\par 2.  For unclear reasons, the diagnostic right mammogram was not performed.\par I contacted Dr. Marcelo to obtain her thoughts about the supplemental imaging I had requested\par \par \par 9/8/2021:\par Received correspondence from radiology that the imagings were reviewed, and radiographic evaluation of the right breast was not felt to be warranted at this time.\par A 6-month interval was suggested for December 2021................ \par I have copied the email into her chart.\par \par \par 9/10/2021:\par Left breast (4:00) sono guided core biopsy at [facility]: Fibroadenoma.\par Recommendations:\par 1.  1 year follow-up left breast ultrasound, September 2022............... \par 2.  6-month follow-up diagnostic right mammogram for an asymmetry in the lateral breast recommended for December 2021.\par Prescription entered 9/21/2021

## 2021-09-21 NOTE — PHYSICAL EXAM
[Normal] : supple, no neck mass and thyroid not enlarged [Normal Neck Lymph Nodes] : normal neck lymph nodes  [Normal Supraclavicular Lymph Nodes] : normal supraclavicular lymph nodes [Normal Groin Lymph Nodes] : normal groin lymph nodes [Normal Axillary Lymph Nodes] : normal axillary lymph nodes [Normal] : normal appearance, no rash, nodules, vesicles, ulcers, erythema [de-identified] : Groins not examined [de-identified] : Below

## 2021-09-21 NOTE — HISTORY OF PRESENT ILLNESS
[de-identified] : 50-year-old lady referred by her internist Dr. Kenya SON after recent abnormal BILATERAL breast imaging:\par \par 2021:\par Bilateral mammogram and sonogram at [place]: BI-RADS 0.\par Left breast (4:00) nodular density, for which targeted ultrasound was recommended\par Right breast (outer) radiographic abnormality for which targeted mammogram/ultrasound were recommended.\par \par 2021:\par Supplemental imaging at [place]:\par 1.  Left breast (4:00) subcentimeter nodule.\par Sono-guided core biopsy with clip placement recommended.\par 2.  Right breast (outer) radiographic abnormality without sonographic correlate.\par If no prior films available for comparison:\par Right stereotactic biopsy recommended.\par \par The imaging was subsequently reviewed at [place] in 2021.\par None of the studies were repeated.\par Their impression was:\par Right side effaces, no intervention recommended.\par Left appears benign and unchanged since ; amenable to short-term follow-up imaging\par \par \par + Bilateral mastalgia since ~2021, sharp, intermittent, left greater than sign right.\par No other signs or symptoms related to either breast\par \par No prior personal history of breast disease.\par \par No previous breast biopsies.\par \par No relatives with breast cancer.\par No relatives with ovarian cancer.\par \par Not Ashkenazi.\par + South  heritage.\par \par No relatives with a history of malignancy.\par \par Menarche at 11.\par  3, para 3, first at 21.\par Natural menopause at 46.\par \par \par Breast cancer risk score =9.3%\par Tyrer-Cuzick risk score =10.8%.\par \par \par + Prior personal history of THYROID CANCER.\par \par \par \par \par MD: Dr. Kenya SON.\par \par Cardiology: Dr. Guillermo BARRETO.\par \par No pacemaker or defibrillator.\par No anticoagulants.\par \par + Hypertension.\par Treated with chlorthalidone.\par \par + Hypercholesterolemia.\par Treated with atorvastatin\par Nonalcoholic fatty liver disease.\par \par +DIABETES.\par Does not see an endocrinologist.\par Treated with Basaglar.\par \par + Thyroid cancer.\par Takes daily levothyroxine.\par Endocrinologist......................... \par \par \par GYN:\par \par \par Colonoscopy:

## 2021-09-21 NOTE — REASON FOR VISIT
[Initial Consultation] : an initial consultation for [Other: _____] : [unfilled] [FreeTextEntry2] : Recent abnormal breast imaging

## 2021-09-27 ENCOUNTER — TRANSCRIPTION ENCOUNTER (OUTPATIENT)
Age: 51
End: 2021-09-27

## 2021-11-21 ENCOUNTER — TRANSCRIPTION ENCOUNTER (OUTPATIENT)
Age: 51
End: 2021-11-21

## 2021-11-22 ENCOUNTER — TRANSCRIPTION ENCOUNTER (OUTPATIENT)
Age: 51
End: 2021-11-22

## 2021-12-20 ENCOUNTER — APPOINTMENT (OUTPATIENT)
Dept: INTERNAL MEDICINE | Facility: CLINIC | Age: 51
End: 2021-12-20
Payer: MEDICAID

## 2021-12-20 VITALS
DIASTOLIC BLOOD PRESSURE: 70 MMHG | SYSTOLIC BLOOD PRESSURE: 138 MMHG | WEIGHT: 183 LBS | BODY MASS INDEX: 30.49 KG/M2 | HEIGHT: 65 IN

## 2021-12-20 VITALS — RESPIRATION RATE: 15 BRPM | HEIGHT: 65 IN | HEART RATE: 79 BPM | OXYGEN SATURATION: 96 %

## 2021-12-20 DIAGNOSIS — Z87.448 PERSONAL HISTORY OF OTHER DISEASES OF URINARY SYSTEM: ICD-10-CM

## 2021-12-20 DIAGNOSIS — Z79.4 TYPE 2 DIABETES MELLITUS WITH HYPERGLYCEMIA: ICD-10-CM

## 2021-12-20 DIAGNOSIS — E11.65 TYPE 2 DIABETES MELLITUS WITH HYPERGLYCEMIA: ICD-10-CM

## 2021-12-20 LAB — HBA1C MFR BLD HPLC: 8

## 2021-12-20 PROCEDURE — 83036 HEMOGLOBIN GLYCOSYLATED A1C: CPT | Mod: QW

## 2021-12-20 PROCEDURE — 99214 OFFICE O/P EST MOD 30 MIN: CPT | Mod: 25

## 2021-12-20 RX ORDER — HYDRALAZINE HYDROCHLORIDE 25 MG/1
25 TABLET ORAL
Qty: 270 | Refills: 0 | Status: COMPLETED | COMMUNITY
Start: 2021-08-04 | End: 2021-12-20

## 2021-12-20 RX ORDER — AMLODIPINE BESYLATE 2.5 MG/1
2.5 TABLET ORAL DAILY
Qty: 30 | Refills: 9 | Status: COMPLETED | COMMUNITY
Start: 2021-08-30 | End: 2021-12-20

## 2021-12-20 NOTE — ASSESSMENT
[FreeTextEntry1] : 51F with HTN, uncontrolled T2DM, obesity, hypothyroidism, NAFLD presents for follow up visit today \par \par 1. Hypertension - BP acceptable on current medications. \par -Reviewed note from Dr. Dahl on August 30, 2021\par -losartan 100 mg, carvedilol 25 mg BID\par -Electrolytes and creatinine from August 30, 2021-stable\par \par 2. T2DM\par -a1c 8.8% August 2021; 8.0 today 12/20/2021. \par -was following with endocrinologist and currently on: basaglar 18 units, glipizide 10 mg, and janumet  mg bid. has been coming every 3 months at minimum - no glp 1 agonist given hx thyroid cancer. consider jaridance but a1c is improving with diet/exercise so can hold off on changes for now/ f/u in 3 m\par -based on ACC/AHA guidelines should be on statin; we discussed risks and benefits and started atorvastatin 20 mg but the patient is not ready to start the medication yet. \par -foot exam: normal 08/21\par -eye exam: 11/2020; repeat this november\par -urine mircro alb/cr ratio neg in october. on losartan. Repeat today\par -pneumovax - had booster yesterday; advised is fine with me to give but pt would like to wait until next visit\par \par 3. Obesity\par -lost over 10 lbs since aug 30. \par \par 4. HCM\par -pap smear negative 09/21\par -FIT test - negative 08//2021\par -pt needs breast bx from September 10, 2021 shows fibroadenoma\par -flu vaccine, pneumovax, tdap and shingrix are due however patient declined\par -COVID vaccine completed, booster done yesterday. will get pneumovax next visit. \par \par RTC in 3 months. \par

## 2021-12-20 NOTE — HISTORY OF PRESENT ILLNESS
[FreeTextEntry1] : f/u of t2dm, htn [de-identified] : 51F with HTN, T2DM presents to clinic today for follow up of recent breast imaging \par \par Walking more for exercise - A1c improved from 8.8% to 8.0%. \par Tries to reduce rice and roti. \par Otherwise feeling well, got booster yesterday. Had some chills.

## 2021-12-21 ENCOUNTER — TRANSCRIPTION ENCOUNTER (OUTPATIENT)
Age: 51
End: 2021-12-21

## 2021-12-21 LAB
25(OH)D3 SERPL-MCNC: 20.7 NG/ML
ALBUMIN SERPL ELPH-MCNC: 4.6 G/DL
ALP BLD-CCNC: 78 U/L
ALT SERPL-CCNC: 12 U/L
ANION GAP SERPL CALC-SCNC: 16 MMOL/L
AST SERPL-CCNC: 14 U/L
BASOPHILS # BLD AUTO: 0.03 K/UL
BASOPHILS NFR BLD AUTO: 0.4 %
BILIRUB SERPL-MCNC: 0.2 MG/DL
BUN SERPL-MCNC: 12 MG/DL
CALCIUM SERPL-MCNC: 9.1 MG/DL
CHLORIDE SERPL-SCNC: 99 MMOL/L
CHOLEST SERPL-MCNC: 169 MG/DL
CO2 SERPL-SCNC: 21 MMOL/L
CREAT SERPL-MCNC: 1.27 MG/DL
CREAT SPEC-SCNC: 53 MG/DL
EOSINOPHIL # BLD AUTO: 0.47 K/UL
EOSINOPHIL NFR BLD AUTO: 6 %
GLUCOSE SERPL-MCNC: 180 MG/DL
HCT VFR BLD CALC: 40.1 %
HDLC SERPL-MCNC: 52 MG/DL
HGB BLD-MCNC: 12.4 G/DL
IMM GRANULOCYTES NFR BLD AUTO: 0.6 %
LDLC SERPL CALC-MCNC: 84 MG/DL
LYMPHOCYTES # BLD AUTO: 2.71 K/UL
LYMPHOCYTES NFR BLD AUTO: 34.9 %
MAN DIFF?: NORMAL
MCHC RBC-ENTMCNC: 28.6 PG
MCHC RBC-ENTMCNC: 30.9 GM/DL
MCV RBC AUTO: 92.4 FL
MICROALBUMIN 24H UR DL<=1MG/L-MCNC: <1.2 MG/DL
MICROALBUMIN/CREAT 24H UR-RTO: NORMAL MG/G
MONOCYTES # BLD AUTO: 0.62 K/UL
MONOCYTES NFR BLD AUTO: 8 %
NEUTROPHILS # BLD AUTO: 3.89 K/UL
NEUTROPHILS NFR BLD AUTO: 50.1 %
NONHDLC SERPL-MCNC: 117 MG/DL
PLATELET # BLD AUTO: 261 K/UL
POTASSIUM SERPL-SCNC: 4.8 MMOL/L
PROT SERPL-MCNC: 7.6 G/DL
RBC # BLD: 4.34 M/UL
RBC # FLD: 13.6 %
SODIUM SERPL-SCNC: 136 MMOL/L
T4 FREE SERPL-MCNC: 2.1 NG/DL
TRIGL SERPL-MCNC: 165 MG/DL
TSH SERPL-ACNC: 0.15 UIU/ML
VIT B12 SERPL-MCNC: 371 PG/ML
WBC # FLD AUTO: 7.77 K/UL

## 2021-12-21 RX ORDER — LEVOTHYROXINE SODIUM 0.17 MG/1
175 TABLET ORAL
Qty: 1 | Refills: 1 | Status: COMPLETED | COMMUNITY
End: 2021-12-21

## 2022-01-10 ENCOUNTER — APPOINTMENT (OUTPATIENT)
Dept: CARDIOLOGY | Facility: CLINIC | Age: 52
End: 2022-01-10
Payer: MEDICAID

## 2022-01-10 ENCOUNTER — NON-APPOINTMENT (OUTPATIENT)
Age: 52
End: 2022-01-10

## 2022-01-10 VITALS
SYSTOLIC BLOOD PRESSURE: 148 MMHG | WEIGHT: 194 LBS | HEART RATE: 71 BPM | DIASTOLIC BLOOD PRESSURE: 73 MMHG | BODY MASS INDEX: 32.32 KG/M2 | OXYGEN SATURATION: 99 % | HEIGHT: 65 IN

## 2022-01-10 VITALS — DIASTOLIC BLOOD PRESSURE: 70 MMHG | SYSTOLIC BLOOD PRESSURE: 135 MMHG

## 2022-01-10 PROCEDURE — 93000 ELECTROCARDIOGRAM COMPLETE: CPT

## 2022-01-10 PROCEDURE — 99214 OFFICE O/P EST MOD 30 MIN: CPT

## 2022-01-10 NOTE — HISTORY OF PRESENT ILLNESS
[FreeTextEntry1] : 51-year-old female with a history of obesity, hypertension, hypercholesterolemia, diabetes.  She has no history of coronary disease and remains active with no exertional complaints.  Her blood pressure was high when she was last seen in August and she started amlodipine 2.5 but developed peripheral edema and is now back on carvedilol 25 twice daily and losartan 100.  She had a complete physical on 12/20 and at that time her LDL cholesterol was 84 and her hemoglobin A1c was 8.0 which was improved from 8.8 a month earlier.

## 2022-01-10 NOTE — DISCUSSION/SUMMARY
[FreeTextEntry1] : Ms Holland has no new complaints.  Her exam shows no change in her weight with a BMI of 32, normal repeat blood pressure, clear lungs, and a normal cardiac exam.  Her EKG remains within normal limits.\par \par I told her I did not think she needed to change her current regimen and she will continue on carvedilol 25 twice daily, losartan 100, Lipitor 20, glipizide, Janumet, and insulin.  She will follow-up here in 6 months.

## 2022-02-13 ENCOUNTER — TRANSCRIPTION ENCOUNTER (OUTPATIENT)
Age: 52
End: 2022-02-13

## 2022-03-08 ENCOUNTER — APPOINTMENT (OUTPATIENT)
Dept: ENDOCRINOLOGY | Facility: CLINIC | Age: 52
End: 2022-03-08

## 2022-04-17 ENCOUNTER — RX RENEWAL (OUTPATIENT)
Age: 52
End: 2022-04-17

## 2022-04-24 ENCOUNTER — RX RENEWAL (OUTPATIENT)
Age: 52
End: 2022-04-24

## 2022-05-02 ENCOUNTER — RX RENEWAL (OUTPATIENT)
Age: 52
End: 2022-05-02

## 2022-05-16 ENCOUNTER — TRANSCRIPTION ENCOUNTER (OUTPATIENT)
Age: 52
End: 2022-05-16

## 2022-05-23 ENCOUNTER — TRANSCRIPTION ENCOUNTER (OUTPATIENT)
Age: 52
End: 2022-05-23

## 2022-05-24 ENCOUNTER — TRANSCRIPTION ENCOUNTER (OUTPATIENT)
Age: 52
End: 2022-05-24

## 2022-05-31 ENCOUNTER — APPOINTMENT (OUTPATIENT)
Dept: INTERNAL MEDICINE | Facility: CLINIC | Age: 52
End: 2022-05-31
Payer: MEDICAID

## 2022-05-31 VITALS
WEIGHT: 194 LBS | HEART RATE: 64 BPM | SYSTOLIC BLOOD PRESSURE: 130 MMHG | HEIGHT: 65 IN | BODY MASS INDEX: 32.32 KG/M2 | DIASTOLIC BLOOD PRESSURE: 75 MMHG

## 2022-05-31 DIAGNOSIS — R19.7 DIARRHEA, UNSPECIFIED: ICD-10-CM

## 2022-05-31 DIAGNOSIS — D24.9 BENIGN NEOPLASM OF UNSPECIFIED BREAST: ICD-10-CM

## 2022-05-31 DIAGNOSIS — R07.89 OTHER CHEST PAIN: ICD-10-CM

## 2022-05-31 DIAGNOSIS — Z87.898 PERSONAL HISTORY OF OTHER SPECIFIED CONDITIONS: ICD-10-CM

## 2022-05-31 LAB — HBA1C MFR BLD HPLC: 7.9

## 2022-05-31 PROCEDURE — G0442 ANNUAL ALCOHOL SCREEN 15 MIN: CPT

## 2022-05-31 PROCEDURE — G0444 DEPRESSION SCREEN ANNUAL: CPT | Mod: 59

## 2022-05-31 PROCEDURE — 99396 PREV VISIT EST AGE 40-64: CPT | Mod: 25

## 2022-05-31 PROCEDURE — 90471 IMMUNIZATION ADMIN: CPT

## 2022-05-31 PROCEDURE — 83036 HEMOGLOBIN GLYCOSYLATED A1C: CPT | Mod: QW

## 2022-05-31 PROCEDURE — 90677 PCV20 VACCINE IM: CPT

## 2022-05-31 NOTE — HEALTH RISK ASSESSMENT
[Good] : ~his/her~  mood as  good [Never] : Never [No] : In the past 12 months have you used drugs other than those required for medical reasons? No [0] : 2) Feeling down, depressed, or hopeless: Not at all (0) [PHQ-2 Negative - No further assessment needed] : PHQ-2 Negative - No further assessment needed [Audit-CScore] : 0 [de-identified] : trying to walk [de-identified] : evening meal is 1c rice, vegetables, fish or chicken. lunch time sometimes nothing sometimes 1-2 cookies. breakfast - one slide of bread. Feeling very hungry at night.  [[id number]] : 0

## 2022-05-31 NOTE — ASSESSMENT
[FreeTextEntry1] : 51F with HTN, uncontrolled T2DM, obesity, hypothyroidism, NAFLD presents for CPE\par \par 1. Hypertension - BP controlled\par -Reviewed note from Dr. Dahl on Teo 10, 2022\par -losartan 100 mg, carvedilol 25 mg BID\par \par 2. T2DM\par -a1c 8.8% August 2021; 8.0 12/20/2021. repeat 05/31: 7.9%\par -Currently on: basaglar 16 units, glipizide 10 mg, and janumet  mg bid. - no glp 1 agonist given hx thyroid cancer. consider jaridance \par -based on ACC/AHA guidelines should be on statin; we discussed risks and benefits and started atorvastatin 20 mg but the patient is not ready to start the medication yet. \par -foot exam: normal 05/22\par -eye exam: 11/2020; repeat this year. \par -urine mircro alb/cr ratio \par -prevnar 20- 5/2022\par \par 3. Obesity-pt working on diet/exercise. declined referral to obesity med\par \par 4. HCM\par -pap smear negative 09/21\par -FIT test - negative 08//2021; repeat this august. new referral given\par -pt needs breast bx from September 10, 2021 shows fibroadenoma. \par -prevnar 20 given 05/2022\par -COVID vaccine completed, booster completed\par \par RTC in 3 months.

## 2022-05-31 NOTE — HISTORY OF PRESENT ILLNESS
[FreeTextEntry1] : CPE [de-identified] : 51F with HTN, T2DM, obesity presents to clinic today for CPE \par Patient worried that DM is going to be less controlled, working more hours now and not able to exercise. She tries to walk at home. \par AM FS is 160, patient self decreased basaglar. during ramadan but didn't increase it again. \par FS at night 10 pm is 200 or 210.

## 2022-05-31 NOTE — PHYSICAL EXAM
[Normal Voice/Communication] : normal voice/communication [Normal Sclera/Conjunctiva] : normal sclera/conjunctiva [Pedal Pulses Present] : the pedal pulses are present [No Edema] : there was no peripheral edema [No Extremity Clubbing/Cyanosis] : no extremity clubbing/cyanosis [Soft] : abdomen soft [Non Tender] : non-tender [Non-distended] : non-distended [Normal Supraclavicular Nodes] : no supraclavicular lymphadenopathy [Coordination Grossly Intact] : coordination grossly intact [Normal Gait] : normal gait [Normal] : affect was normal and insight and judgment were intact [Comprehensive Foot Exam Normal] : Right and left foot were examined and both feet are normal. No ulcers in either foot. Toes are normal and with full ROM.  Normal tactile sensation with monofilament testing throughout both feet [de-identified] : scar from thyroidectomy

## 2022-06-01 ENCOUNTER — TRANSCRIPTION ENCOUNTER (OUTPATIENT)
Age: 52
End: 2022-06-01

## 2022-06-01 LAB
25(OH)D3 SERPL-MCNC: 21.6 NG/ML
ALBUMIN SERPL ELPH-MCNC: 4.9 G/DL
ALP BLD-CCNC: 80 U/L
ALT SERPL-CCNC: 12 U/L
ANION GAP SERPL CALC-SCNC: 16 MMOL/L
APPEARANCE: ABNORMAL
AST SERPL-CCNC: 30 U/L
BACTERIA: ABNORMAL
BASOPHILS # BLD AUTO: 0.05 K/UL
BASOPHILS NFR BLD AUTO: 0.4 %
BILIRUB SERPL-MCNC: 0.3 MG/DL
BILIRUBIN URINE: NEGATIVE
BLOOD URINE: NEGATIVE
BUN SERPL-MCNC: 23 MG/DL
CALCIUM SERPL-MCNC: 10.3 MG/DL
CHLORIDE SERPL-SCNC: 101 MMOL/L
CHOLEST SERPL-MCNC: 180 MG/DL
CO2 SERPL-SCNC: 21 MMOL/L
COLOR: YELLOW
CREAT SERPL-MCNC: 1.92 MG/DL
CREAT SPEC-SCNC: 96 MG/DL
EGFR: 31 ML/MIN/1.73M2
EOSINOPHIL # BLD AUTO: 0.24 K/UL
EOSINOPHIL NFR BLD AUTO: 2 %
GLUCOSE QUALITATIVE U: NEGATIVE
GLUCOSE SERPL-MCNC: 128 MG/DL
HCT VFR BLD CALC: 39.1 %
HDLC SERPL-MCNC: 57 MG/DL
HGB BLD-MCNC: 12.1 G/DL
HYALINE CASTS: 3 /LPF
IMM GRANULOCYTES NFR BLD AUTO: 0.7 %
KETONES URINE: NEGATIVE
LDLC SERPL CALC-MCNC: 84 MG/DL
LEUKOCYTE ESTERASE URINE: ABNORMAL
LYMPHOCYTES # BLD AUTO: 4.18 K/UL
LYMPHOCYTES NFR BLD AUTO: 34.9 %
MAN DIFF?: NORMAL
MCHC RBC-ENTMCNC: 28.7 PG
MCHC RBC-ENTMCNC: 30.9 GM/DL
MCV RBC AUTO: 92.7 FL
MICROALBUMIN 24H UR DL<=1MG/L-MCNC: <1.2 MG/DL
MICROALBUMIN/CREAT 24H UR-RTO: NORMAL MG/G
MICROSCOPIC-UA: NORMAL
MONOCYTES # BLD AUTO: 0.63 K/UL
MONOCYTES NFR BLD AUTO: 5.3 %
NEUTROPHILS # BLD AUTO: 6.8 K/UL
NEUTROPHILS NFR BLD AUTO: 56.7 %
NITRITE URINE: NEGATIVE
NONHDLC SERPL-MCNC: 123 MG/DL
PH URINE: 5.5
PLATELET # BLD AUTO: 254 K/UL
POTASSIUM SERPL-SCNC: 5.1 MMOL/L
PROT SERPL-MCNC: 7.8 G/DL
PROTEIN URINE: NEGATIVE
RBC # BLD: 4.22 M/UL
RBC # FLD: 13.5 %
RED BLOOD CELLS URINE: 3 /HPF
SODIUM SERPL-SCNC: 139 MMOL/L
SPECIFIC GRAVITY URINE: 1.01
SQUAMOUS EPITHELIAL CELLS: 8 /HPF
T4 FREE SERPL-MCNC: 1.8 NG/DL
TRIGL SERPL-MCNC: 194 MG/DL
TSH SERPL-ACNC: 0.09 UIU/ML
UROBILINOGEN URINE: NORMAL
VIT B12 SERPL-MCNC: 427 PG/ML
WBC # FLD AUTO: 11.98 K/UL
WHITE BLOOD CELLS URINE: 46 /HPF

## 2022-06-02 ENCOUNTER — TRANSCRIPTION ENCOUNTER (OUTPATIENT)
Age: 52
End: 2022-06-02

## 2022-06-04 NOTE — ED ADULT TRIAGE NOTE - ACCOMPANIED BY
Gave msg re: cholesterol and Vitamin D to [employer], patient's daughter who understood 
Aide
- - -

## 2022-06-05 ENCOUNTER — TRANSCRIPTION ENCOUNTER (OUTPATIENT)
Age: 52
End: 2022-06-05

## 2022-06-08 ENCOUNTER — TRANSCRIPTION ENCOUNTER (OUTPATIENT)
Age: 52
End: 2022-06-08

## 2022-06-10 ENCOUNTER — TRANSCRIPTION ENCOUNTER (OUTPATIENT)
Age: 52
End: 2022-06-10

## 2022-06-10 LAB
ANION GAP SERPL CALC-SCNC: 15 MMOL/L
BUN SERPL-MCNC: 15 MG/DL
CALCIUM SERPL-MCNC: 9.4 MG/DL
CHLORIDE SERPL-SCNC: 96 MMOL/L
CO2 SERPL-SCNC: 22 MMOL/L
CREAT SERPL-MCNC: 1.52 MG/DL
EGFR: 41 ML/MIN/1.73M2
GLUCOSE SERPL-MCNC: 94 MG/DL
POTASSIUM SERPL-SCNC: 4.6 MMOL/L
SODIUM SERPL-SCNC: 133 MMOL/L

## 2022-06-11 ENCOUNTER — TRANSCRIPTION ENCOUNTER (OUTPATIENT)
Age: 52
End: 2022-06-11

## 2022-06-14 ENCOUNTER — TRANSCRIPTION ENCOUNTER (OUTPATIENT)
Age: 52
End: 2022-06-14

## 2022-06-24 ENCOUNTER — TRANSCRIPTION ENCOUNTER (OUTPATIENT)
Age: 52
End: 2022-06-24

## 2022-06-24 DIAGNOSIS — D72.829 ELEVATED WHITE BLOOD CELL COUNT, UNSPECIFIED: ICD-10-CM

## 2022-06-27 ENCOUNTER — TRANSCRIPTION ENCOUNTER (OUTPATIENT)
Age: 52
End: 2022-06-27

## 2022-06-27 LAB
ANION GAP SERPL CALC-SCNC: 17 MMOL/L
BASOPHILS # BLD AUTO: 0.05 K/UL
BASOPHILS NFR BLD AUTO: 0.5 %
BUN SERPL-MCNC: 16 MG/DL
CALCIUM SERPL-MCNC: 9.7 MG/DL
CHLORIDE SERPL-SCNC: 99 MMOL/L
CO2 SERPL-SCNC: 19 MMOL/L
CREAT SERPL-MCNC: 1.48 MG/DL
EGFR: 42 ML/MIN/1.73M2
EOSINOPHIL # BLD AUTO: 0.23 K/UL
EOSINOPHIL NFR BLD AUTO: 2.4 %
GLUCOSE SERPL-MCNC: 229 MG/DL
HCT VFR BLD CALC: 37.4 %
HGB BLD-MCNC: 11.9 G/DL
IMM GRANULOCYTES NFR BLD AUTO: 0.5 %
LYMPHOCYTES # BLD AUTO: 3.04 K/UL
LYMPHOCYTES NFR BLD AUTO: 32.1 %
MAN DIFF?: NORMAL
MCHC RBC-ENTMCNC: 28.7 PG
MCHC RBC-ENTMCNC: 31.8 GM/DL
MCV RBC AUTO: 90.3 FL
MONOCYTES # BLD AUTO: 0.6 K/UL
MONOCYTES NFR BLD AUTO: 6.3 %
NEUTROPHILS # BLD AUTO: 5.49 K/UL
NEUTROPHILS NFR BLD AUTO: 58.2 %
PLATELET # BLD AUTO: 258 K/UL
POTASSIUM SERPL-SCNC: 5.2 MMOL/L
RBC # BLD: 4.14 M/UL
RBC # FLD: 13.3 %
SODIUM SERPL-SCNC: 135 MMOL/L
WBC # FLD AUTO: 9.46 K/UL

## 2022-06-28 ENCOUNTER — TRANSCRIPTION ENCOUNTER (OUTPATIENT)
Age: 52
End: 2022-06-28

## 2022-06-29 ENCOUNTER — TRANSCRIPTION ENCOUNTER (OUTPATIENT)
Age: 52
End: 2022-06-29

## 2022-06-30 ENCOUNTER — TRANSCRIPTION ENCOUNTER (OUTPATIENT)
Age: 52
End: 2022-06-30

## 2022-06-30 ENCOUNTER — NON-APPOINTMENT (OUTPATIENT)
Age: 52
End: 2022-06-30

## 2022-07-01 ENCOUNTER — RX RENEWAL (OUTPATIENT)
Age: 52
End: 2022-07-01

## 2022-07-05 ENCOUNTER — TRANSCRIPTION ENCOUNTER (OUTPATIENT)
Age: 52
End: 2022-07-05

## 2022-07-11 ENCOUNTER — APPOINTMENT (OUTPATIENT)
Dept: CARDIOLOGY | Facility: CLINIC | Age: 52
End: 2022-07-11

## 2022-07-11 ENCOUNTER — NON-APPOINTMENT (OUTPATIENT)
Age: 52
End: 2022-07-11

## 2022-07-11 VITALS
BODY MASS INDEX: 31.62 KG/M2 | WEIGHT: 190 LBS | HEART RATE: 64 BPM | SYSTOLIC BLOOD PRESSURE: 144 MMHG | OXYGEN SATURATION: 98 % | DIASTOLIC BLOOD PRESSURE: 79 MMHG

## 2022-07-11 DIAGNOSIS — Z71.1 PERSON WITH FEARED HEALTH COMPLAINT IN WHOM NO DIAGNOSIS IS MADE: ICD-10-CM

## 2022-07-11 PROCEDURE — 93000 ELECTROCARDIOGRAM COMPLETE: CPT

## 2022-07-11 PROCEDURE — 99214 OFFICE O/P EST MOD 30 MIN: CPT | Mod: 25

## 2022-07-11 PROCEDURE — 36415 COLL VENOUS BLD VENIPUNCTURE: CPT

## 2022-07-11 NOTE — HISTORY OF PRESENT ILLNESS
[FreeTextEntry1] : 51-year-old female with a history of hypertension, hypercholesterolemia, diabetes, obesity.  She was last seen 6 months ago.  She has no prior history of heart disease and remains without any cardiac complaints.  Her creatinine has increased slightly recently and she is very concerned about it.  Her metformin was decreased as a consequence and she was started on Jardiance.  She has been dieting and has lost a few pounds.\par \par Her most recent blood work shows an A1c of 7.9 and an LDL cholesterol of 84.

## 2022-07-11 NOTE — DISCUSSION/SUMMARY
[FreeTextEntry1] : Ms Holland has no cardiac complaints.  Her exam shows a weight loss of 4 pounds, borderline blood pressure, clear lungs, and a normal cardiac exam.  Her EKG is within normal limits.  She is concerned about her renal function and her diabetic regimen was recently changed so complete blood work was drawn.\par \par She will continue on her current regimen of carvedilol 25 twice daily, losartan 100, glipizide 10, Janumet 50/500 once a day, Jardiance 10, and atorvastatin 20.  She will follow-up in 6 months.

## 2022-07-12 ENCOUNTER — TRANSCRIPTION ENCOUNTER (OUTPATIENT)
Age: 52
End: 2022-07-12

## 2022-07-12 LAB
ALBUMIN SERPL ELPH-MCNC: 4.7 G/DL
ALP BLD-CCNC: 83 U/L
ALT SERPL-CCNC: 9 U/L
ANION GAP SERPL CALC-SCNC: 15 MMOL/L
AST SERPL-CCNC: 12 U/L
BASOPHILS # BLD AUTO: 0.05 K/UL
BASOPHILS NFR BLD AUTO: 0.5 %
BILIRUB SERPL-MCNC: 0.2 MG/DL
BUN SERPL-MCNC: 20 MG/DL
CALCIUM SERPL-MCNC: 9.6 MG/DL
CHLORIDE SERPL-SCNC: 100 MMOL/L
CHOLEST SERPL-MCNC: 169 MG/DL
CO2 SERPL-SCNC: 21 MMOL/L
CREAT SERPL-MCNC: 1.66 MG/DL
EGFR: 37 ML/MIN/1.73M2
EOSINOPHIL # BLD AUTO: 0.19 K/UL
EOSINOPHIL NFR BLD AUTO: 2.1 %
ESTIMATED AVERAGE GLUCOSE: 186 MG/DL
GLUCOSE SERPL-MCNC: 195 MG/DL
HBA1C MFR BLD HPLC: 8.1 %
HCT VFR BLD CALC: 38.7 %
HDLC SERPL-MCNC: 47 MG/DL
HGB BLD-MCNC: 12 G/DL
IMM GRANULOCYTES NFR BLD AUTO: 0.8 %
LDLC SERPL CALC-MCNC: 91 MG/DL
LYMPHOCYTES # BLD AUTO: 2.59 K/UL
LYMPHOCYTES NFR BLD AUTO: 28.2 %
MAN DIFF?: NORMAL
MCHC RBC-ENTMCNC: 29.2 PG
MCHC RBC-ENTMCNC: 31 GM/DL
MCV RBC AUTO: 94.2 FL
MONOCYTES # BLD AUTO: 0.43 K/UL
MONOCYTES NFR BLD AUTO: 4.7 %
NEUTROPHILS # BLD AUTO: 5.87 K/UL
NEUTROPHILS NFR BLD AUTO: 63.7 %
NONHDLC SERPL-MCNC: 121 MG/DL
PLATELET # BLD AUTO: 263 K/UL
POTASSIUM SERPL-SCNC: 5.8 MMOL/L
PROT SERPL-MCNC: 7.6 G/DL
RBC # BLD: 4.11 M/UL
RBC # FLD: 13.2 %
SODIUM SERPL-SCNC: 136 MMOL/L
TRIGL SERPL-MCNC: 153 MG/DL
TSH SERPL-ACNC: 0.04 UIU/ML
WBC # FLD AUTO: 9.2 K/UL

## 2022-07-14 ENCOUNTER — TRANSCRIPTION ENCOUNTER (OUTPATIENT)
Age: 52
End: 2022-07-14

## 2022-07-19 ENCOUNTER — TRANSCRIPTION ENCOUNTER (OUTPATIENT)
Age: 52
End: 2022-07-19

## 2022-07-21 ENCOUNTER — APPOINTMENT (OUTPATIENT)
Dept: NEPHROLOGY | Facility: CLINIC | Age: 52
End: 2022-07-21

## 2022-07-21 VITALS
WEIGHT: 190.48 LBS | OXYGEN SATURATION: 99 % | SYSTOLIC BLOOD PRESSURE: 133 MMHG | DIASTOLIC BLOOD PRESSURE: 82 MMHG | BODY MASS INDEX: 31.74 KG/M2 | HEART RATE: 63 BPM | TEMPERATURE: 97.3 F | HEIGHT: 65 IN

## 2022-07-21 DIAGNOSIS — E87.5 HYPERKALEMIA: ICD-10-CM

## 2022-07-21 DIAGNOSIS — Z78.9 OTHER SPECIFIED HEALTH STATUS: ICD-10-CM

## 2022-07-21 DIAGNOSIS — E87.1 HYPO-OSMOLALITY AND HYPONATREMIA: ICD-10-CM

## 2022-07-21 DIAGNOSIS — Z82.49 FAMILY HISTORY OF ISCHEMIC HEART DISEASE AND OTHER DISEASES OF THE CIRCULATORY SYSTEM: ICD-10-CM

## 2022-07-21 DIAGNOSIS — Z83.3 FAMILY HISTORY OF DIABETES MELLITUS: ICD-10-CM

## 2022-07-21 DIAGNOSIS — E87.2 ACIDOSIS: ICD-10-CM

## 2022-07-21 DIAGNOSIS — Z85.850 PERSONAL HISTORY OF MALIGNANT NEOPLASM OF THYROID: ICD-10-CM

## 2022-07-21 PROCEDURE — 99205 OFFICE O/P NEW HI 60 MIN: CPT

## 2022-07-21 NOTE — PHYSICAL EXAM
[General Appearance - Alert] : alert [General Appearance - In No Acute Distress] : in no acute distress [General Appearance - Well Nourished] : well nourished [Sclera] : the sclera and conjunctiva were normal [Outer Ear] : the ears and nose were normal in appearance [Hearing Threshold Finger Rub Not [place]] : hearing was normal [Jugular Venous Distention Increased] : there was no jugular-venous distention [Exaggerated Use Of Accessory Muscles For Inspiration] : no accessory muscle use [Auscultation Breath Sounds / Voice Sounds] : lungs were clear to auscultation bilaterally [Heart Sounds] : normal S1 and S2 [Heart Sounds Pericardial Friction Rub] : no pericardial rub [Edema] : there was no peripheral edema [Abdomen Soft] : soft [] : no rash [No Focal Deficits] : no focal deficits [Oriented To Time, Place, And Person] : oriented to person, place, and time [Affect] : the affect was normal [Mood] : the mood was normal

## 2022-07-21 NOTE — ASSESSMENT
[FreeTextEntry1] : SUREKHA: Pt. with ?CKD in the setting of long standing DM2 and previous use of PPIs, with recent SUREKHA, likely hemodynamically mediated. \par On review of available medical records, noted to have Scr WNL (1.2) until April 2022, which increased to 1.3 in June 2021 and has remained between 1.3-1.9 since. \par Recent Scr was 1.66 on labs done on 7/11/22. Pt. has never had albuminuria in the past, however noted to have had WBCs and LE on UA done in May 2022. Pt. likely with hemodynamically mediated fluctuations in Scr, in the setting of use of ARB and SGLT2i.\par Repeat renal panel\par Repeat UA and check spot urine TP/Cr\par Check HBSAg, HCV Ab, serum free light chains and SIFE. \par Continue losartan.\par Continue SGLT2i.\par Counselled to have strict BP and glycemic control.\par \par Hyperkalemia: in the setting of elevated Scr, acidosis and use of losartan. \par Recent potassium elevated at 5.8. \par Repeat serum potassium.\par continue sodium bicarbonate.\par Advised to take low potassium diet.\par \par HTN: BP at goal\par MOnitor BP on current meds\par Encouraged to take low salt diet\par \par Acidosis: in the setting of CKD\par recent serum CO2 level near goal.\par continue oral sodium bicarbonate.\par check serum CO2 level. \par

## 2022-07-21 NOTE — HISTORY OF PRESENT ILLNESS
[FreeTextEntry1] : Referral from PCP for elevated Scr. \par \par Ms. Holland is a 51 year old obese female from [facility], works as a sonographer, with DM2 x since 2010, without any known neuropathy or retinopathy, HTN since 2008, GERD, HLD, hx of thyroid cancer s/p thyroidectomy and radioactive iodine treatment with acquired Hypothyroidism, NAFLD, referred from PCP for evaluation and management of elevated creatinine. \par \par Pt. states that she was first made aware of elevated Scr in 2020 when she had to go to the ER due to excess diarrhea and weakness, that developed after she took an antibiotic for UTI that was diagnosed at that time. SHe states that she also had few electrolyte derangements at that time, and her Cr and electrolytes improved with IVF. Since summer of 2021, her iman Scr has been 1.3. Recently, in summer 2022, her Scr increased to 1.9, then improved to 1.5, and has lately been in 1.4-1.6 range.\par \par Overall, feels well however mentions to have intermittent pain over her bilateral flank region, that resolves spontaneously. Denies any known hx of nephrolithiasis, recurrent UTIs. Denies use of NSAIDs, PPIs at present however did use prevacid and nexium in the past. Has been off of nexium for at least an year now. Her father was started on dialysis ~a year ago and the cause of kidney failure for him is PCKD.

## 2022-07-21 NOTE — CONSULT LETTER
[Dear  ___] : Dear  [unfilled], [Consult Letter:] : I had the pleasure of evaluating your patient, [unfilled]. [( Thank you for referring [unfilled] for consultation for _____ )] : Thank you for referring [unfilled] for consultation for [unfilled] [Please see my note below.] : Please see my note below. [Consult Closing:] : Thank you very much for allowing me to participate in the care of this patient.  If you have any questions, please do not hesitate to contact me. [Sincerely,] : Sincerely, [FreeTextEntry3] : Aisha Appiah MD

## 2022-07-22 LAB
ALBUMIN SERPL ELPH-MCNC: 4.9 G/DL
ANION GAP SERPL CALC-SCNC: 14 MMOL/L
APPEARANCE: CLEAR
BACTERIA: NEGATIVE
BASOPHILS # BLD AUTO: 0.06 K/UL
BASOPHILS NFR BLD AUTO: 0.6 %
BILIRUBIN URINE: NEGATIVE
BLOOD URINE: NEGATIVE
BUN SERPL-MCNC: 20 MG/DL
CALCIUM SERPL-MCNC: 8.9 MG/DL
CHLORIDE SERPL-SCNC: 101 MMOL/L
CO2 SERPL-SCNC: 20 MMOL/L
COLOR: COLORLESS
CREAT SERPL-MCNC: 1.52 MG/DL
CREAT SPEC-SCNC: 38 MG/DL
CREAT/PROT UR: NORMAL RATIO
DEPRECATED KAPPA LC FREE/LAMBDA SER: 1.35 RATIO
EGFR: 41 ML/MIN/1.73M2
EOSINOPHIL # BLD AUTO: 0.18 K/UL
EOSINOPHIL NFR BLD AUTO: 1.8 %
GLUCOSE QUALITATIVE U: ABNORMAL
GLUCOSE SERPL-MCNC: 113 MG/DL
HBV SURFACE AG SER QL: NONREACTIVE
HCT VFR BLD CALC: 38.5 %
HCV AB SER QL: NONREACTIVE
HCV S/CO RATIO: 0.13 S/CO
HGB BLD-MCNC: 13.3 G/DL
HYALINE CASTS: 0 /LPF
IMM GRANULOCYTES NFR BLD AUTO: 0.7 %
KAPPA LC CSF-MCNC: 2.68 MG/DL
KAPPA LC SERPL-MCNC: 3.61 MG/DL
KETONES URINE: NEGATIVE
LEUKOCYTE ESTERASE URINE: ABNORMAL
LYMPHOCYTES # BLD AUTO: 3.55 K/UL
LYMPHOCYTES NFR BLD AUTO: 34.6 %
MAN DIFF?: NORMAL
MCHC RBC-ENTMCNC: 31 PG
MCHC RBC-ENTMCNC: 34.5 GM/DL
MCV RBC AUTO: 89.7 FL
MICROSCOPIC-UA: NORMAL
MONOCYTES # BLD AUTO: 0.56 K/UL
MONOCYTES NFR BLD AUTO: 5.5 %
NEUTROPHILS # BLD AUTO: 5.83 K/UL
NEUTROPHILS NFR BLD AUTO: 56.8 %
NITRITE URINE: NEGATIVE
PH URINE: 6
PHOSPHATE SERPL-MCNC: 3.6 MG/DL
PLATELET # BLD AUTO: 327 K/UL
POTASSIUM SERPL-SCNC: 4.6 MMOL/L
PROT UR-MCNC: <4 MG/DL
PROTEIN URINE: NEGATIVE
RBC # BLD: 4.29 M/UL
RBC # FLD: 13.2 %
RED BLOOD CELLS URINE: 2 /HPF
SODIUM SERPL-SCNC: 134 MMOL/L
SPECIFIC GRAVITY URINE: 1.01
SQUAMOUS EPITHELIAL CELLS: 4 /HPF
UROBILINOGEN URINE: NORMAL
WBC # FLD AUTO: 10.25 K/UL
WHITE BLOOD CELLS URINE: 10 /HPF

## 2022-07-25 ENCOUNTER — TRANSCRIPTION ENCOUNTER (OUTPATIENT)
Age: 52
End: 2022-07-25

## 2022-07-28 LAB — M PROTEIN SPEC IFE-MCNC: NORMAL

## 2022-08-05 ENCOUNTER — APPOINTMENT (OUTPATIENT)
Dept: INTERNAL MEDICINE | Facility: CLINIC | Age: 52
End: 2022-08-05

## 2022-08-05 ENCOUNTER — LABORATORY RESULT (OUTPATIENT)
Age: 52
End: 2022-08-05

## 2022-08-05 VITALS
HEART RATE: 77 BPM | WEIGHT: 189 LBS | SYSTOLIC BLOOD PRESSURE: 150 MMHG | OXYGEN SATURATION: 99 % | DIASTOLIC BLOOD PRESSURE: 70 MMHG | BODY MASS INDEX: 31.49 KG/M2 | HEIGHT: 65 IN

## 2022-08-05 DIAGNOSIS — N17.9 ACUTE KIDNEY FAILURE, UNSPECIFIED: ICD-10-CM

## 2022-08-05 LAB — HBA1C MFR BLD HPLC: 7.7

## 2022-08-05 PROCEDURE — 99214 OFFICE O/P EST MOD 30 MIN: CPT | Mod: 25

## 2022-08-05 PROCEDURE — 83036 HEMOGLOBIN GLYCOSYLATED A1C: CPT | Mod: QW

## 2022-08-05 NOTE — HISTORY OF PRESENT ILLNESS
[FreeTextEntry1] : follow up of T2DM [de-identified] : 51F with HTN, uncontrolled T2DM, obesity, hypothyroidism, NAFLD, recent SUREKHA on CKD presents for follow up\par \par Needs refills for 2 months for [facility]. Her father had ischemic stroke and was started on plavix. He didn't initially have aphasia but then developed it afterwards and now is going back to the hospital. She has 2 sisters there as well. Planning to be in [facility] only, looked up CDC guidelines. No need for malaria ppx in [facility]. Not planning to travel outside. \par \par Getting COVID booster today

## 2022-08-05 NOTE — ASSESSMENT
[FreeTextEntry1] : 51F with HTN, uncontrolled T2DM, obesity, hypothyroidism, NAFLD, recent SUREKHA on CKD presents for follow up\par \par 1. SUREKHA CKD\par -Cr stable, K normalized, started jardiance, c/w losartan 100 mg daily. \par \par 2. Hypertension - BP controlled\par -losartan 100 mg, carvedilol 25 mg BID - elevated today but just came from work, stressed about father's cva. \par \par 3. T2DM\par -a1c 05/31: 7.9%, 08/2022: 7.7%\par -Currently on: basaglar 20 units, glipizide 10 mg, and janumet  mg once daily. - no glp 1 agonist given hx thyroid cancer. jardiance 10 mg daily (needs pr again July 2023)\par -based on ACC/AHA guidelines should be on statin; we discussed risks and benefits and started atorvastatin 20 mg but the patient is not ready to start the medication yet. \par -foot exam: normal 05/22\par -eye exam: 11/2020; repeat this year. \par -urine mircro alb/cr ratio; on losartan\par -prevnar 20- 5/2022\par \par 3. Obesity-pt working on diet/exercise. declined referral to obesity med\par -no ozempic due to thyroid ca\par -d/w pt orlistat, phentermine, topiramate- she wants to think about it before adding more medication\par \par 4. HCM\par -pap smear negative 09/21\par -FIT test - negative 08//2021; repeat this august. 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 \par -prevnar 20 given 05/2022\par -COVID vaccine completed, booster completed\par \par RTC in 3 months.  checking hep a ab - if not immune would vaccinate before travel to [facility]

## 2022-08-07 ENCOUNTER — TRANSCRIPTION ENCOUNTER (OUTPATIENT)
Age: 52
End: 2022-08-07

## 2022-08-07 LAB
ALBUMIN SERPL ELPH-MCNC: 4.8 G/DL
ANION GAP SERPL CALC-SCNC: 19 MMOL/L
BUN SERPL-MCNC: 17 MG/DL
CALCIUM SERPL-MCNC: 9.6 MG/DL
CHLORIDE SERPL-SCNC: 102 MMOL/L
CO2 SERPL-SCNC: 18 MMOL/L
CREAT SERPL-MCNC: 1.48 MG/DL
EGFR: 42 ML/MIN/1.73M2
GLUCOSE SERPL-MCNC: 96 MG/DL
HEPATITIS A IGG ANTIBODY: REACTIVE
PHOSPHATE SERPL-MCNC: 3.9 MG/DL
POTASSIUM SERPL-SCNC: 4.4 MMOL/L
SODIUM SERPL-SCNC: 139 MMOL/L

## 2022-08-07 RX ORDER — SODIUM BICARBONATE 650 MG/1
650 TABLET ORAL TWICE DAILY
Qty: 180 | Refills: 3 | Status: ACTIVE | COMMUNITY
Start: 2022-06-27 | End: 1900-01-01

## 2022-08-07 RX ORDER — PEN NEEDLE, DIABETIC 29 G X1/2"
32G X 4 MM NEEDLE, DISPOSABLE MISCELLANEOUS
Qty: 1 | Refills: 3 | Status: ACTIVE | COMMUNITY
Start: 2020-11-23 | End: 1900-01-01

## 2022-08-07 RX ORDER — BLOOD SUGAR DIAGNOSTIC
STRIP MISCELLANEOUS
Qty: 360 | Refills: 3 | Status: ACTIVE | COMMUNITY
Start: 2020-11-23 | End: 1900-01-01

## 2022-08-07 RX ORDER — SYRING-NEEDL,DISP,INSUL,0.3 ML 30 GX5/16"
SYRINGE, EMPTY DISPOSABLE MISCELLANEOUS
Qty: 100 | Refills: 3 | Status: ACTIVE | COMMUNITY
Start: 2020-11-23 | End: 1900-01-01

## 2022-09-19 ENCOUNTER — RX RENEWAL (OUTPATIENT)
Age: 52
End: 2022-09-19

## 2022-09-20 ENCOUNTER — RX RENEWAL (OUTPATIENT)
Age: 52
End: 2022-09-20

## 2022-09-20 RX ORDER — CHLORTHALIDONE 25 MG/1
25 TABLET ORAL DAILY
Qty: 45 | Refills: 3 | Status: ACTIVE | COMMUNITY
Start: 2021-04-13 | End: 1900-01-01

## 2022-09-26 ENCOUNTER — TRANSCRIPTION ENCOUNTER (OUTPATIENT)
Age: 52
End: 2022-09-26

## 2022-09-26 RX ORDER — BLOOD-GLUCOSE METER
W/DEVICE KIT MISCELLANEOUS
Qty: 1 | Refills: 0 | Status: ACTIVE | COMMUNITY
Start: 2020-11-23 | End: 1900-01-01

## 2022-11-11 ENCOUNTER — RESULT CHARGE (OUTPATIENT)
Age: 52
End: 2022-11-11

## 2022-11-11 ENCOUNTER — APPOINTMENT (OUTPATIENT)
Dept: INTERNAL MEDICINE | Facility: CLINIC | Age: 52
End: 2022-11-11

## 2022-11-11 VITALS
HEIGHT: 65 IN | SYSTOLIC BLOOD PRESSURE: 139 MMHG | BODY MASS INDEX: 30.66 KG/M2 | DIASTOLIC BLOOD PRESSURE: 70 MMHG | WEIGHT: 184 LBS

## 2022-11-11 DIAGNOSIS — N89.8 OTHER SPECIFIED NONINFLAMMATORY DISORDERS OF VAGINA: ICD-10-CM

## 2022-11-11 PROCEDURE — 83036 HEMOGLOBIN GLYCOSYLATED A1C: CPT | Mod: QW

## 2022-11-11 PROCEDURE — 99214 OFFICE O/P EST MOD 30 MIN: CPT | Mod: 25

## 2022-11-11 NOTE — HISTORY OF PRESENT ILLNESS
[FreeTextEntry1] : f/u of t2dm [de-identified] : 52F with HTN, uncontrolled T2DM, obesity, hypothyroidism, NAFLD, recent SUREKHA on CKD presents for follow up\par \par Reports dysuria and foul smelling discharge. Also having vaginal discharge. Sometimes having white watery discharge. Urinating in AM has a fruity smell.\par \par Going to make appointment with Dr. Appiah

## 2022-11-11 NOTE — PHYSICAL EXAM
[Normal] : no respiratory distress, lungs were clear to auscultation bilaterally and no accessory muscle use [de-identified] : pt prefers to do blind swab

## 2022-11-11 NOTE — ASSESSMENT
[FreeTextEntry1] : 52F with HTN, uncontrolled T2DM, obesity, hypothyroidism, NAFLD, recent SUREKHA on CKD presents for follow up\par \par 1. UTI/dysuria\par -avoid macrobid/cipro. bactrim caused rash in the past\par -consider augmentin/amoxicillin? \par -check ua and bv panel - this could impact jaridance use\par \par 2. SUREKHA CKD\par -c/w jardiance, c/w losartan 100 mg daily. repeat labs today\par -pt to f/u with nephrology\par -started sodium bicarb in the past for low bicarb\par \par 3. Hypertension - BP slightly high but pt stressed\par -losartan 100 mg, carvedilol 25 mg BID\par \par 4. T2DM\par -a1c 05/31: 7.9%, 08/2022: 7.7%, 11/2022: 7.7\par -Currently on: basaglar 20 units, glipizide 10 mg, and janumet  mg once daily. - no glp 1 agonist given hx thyroid cancer. jardiance 10 mg daily (needs pr again July 2023)\par -based on ACC/AHA guidelines should be on statin; we discussed risks and benefits and started atorvastatin 20 mg but the patient is not ready to start the medication yet her LDL was 90 when last checked which seems ok. \par -foot exam: normal 05/22\par -eye exam: 11/2020; repeat this year. \par -urine mircro alb/cr ratio; on losartan\par -prevnar 20- 5/2022\par \par 5. Obesity-pt working on diet/exercise. declined referral to obesity med\par -no ozempic due to thyroid ca\par -d/w pt orlistat, phentermine, topiramate- she wants to think about it before adding more medication\par \par 6. HCM\par -pap smear negative 09/21\par -FIT test - negative 08//2021; repeat this august. 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 \par -prevnar 20 given 05/2022\par -COVID vaccine completed, booster completed before september as well. \par -flu: 09/2022\par \par RTC in 3 months\par

## 2022-11-12 ENCOUNTER — TRANSCRIPTION ENCOUNTER (OUTPATIENT)
Age: 52
End: 2022-11-12

## 2022-11-12 LAB
ANION GAP SERPL CALC-SCNC: 14 MMOL/L
APPEARANCE: ABNORMAL
BACTERIA: ABNORMAL
BILIRUBIN URINE: NEGATIVE
BLOOD URINE: NEGATIVE
BUN SERPL-MCNC: 18 MG/DL
CALCIUM SERPL-MCNC: 10 MG/DL
CHLORIDE SERPL-SCNC: 96 MMOL/L
CO2 SERPL-SCNC: 23 MMOL/L
COLOR: NORMAL
CREAT SERPL-MCNC: 1.38 MG/DL
CREAT SPEC-SCNC: 60 MG/DL
CREAT/PROT UR: 0.1 RATIO
EGFR: 45 ML/MIN/1.73M2
GLUCOSE QUALITATIVE U: ABNORMAL
GLUCOSE SERPL-MCNC: 107 MG/DL
HYALINE CASTS: 1 /LPF
KETONES URINE: NEGATIVE
LEUKOCYTE ESTERASE URINE: ABNORMAL
MICROSCOPIC-UA: NORMAL
NITRITE URINE: NEGATIVE
PH URINE: 6
POTASSIUM SERPL-SCNC: 4.3 MMOL/L
PROT UR-MCNC: 5 MG/DL
PROTEIN URINE: NEGATIVE
RED BLOOD CELLS URINE: 3 /HPF
SODIUM SERPL-SCNC: 133 MMOL/L
SPECIFIC GRAVITY URINE: 1.01
SQUAMOUS EPITHELIAL CELLS: 2 /HPF
UROBILINOGEN URINE: NORMAL
WHITE BLOOD CELLS URINE: 67 /HPF

## 2022-11-13 ENCOUNTER — TRANSCRIPTION ENCOUNTER (OUTPATIENT)
Age: 52
End: 2022-11-13

## 2022-11-13 DIAGNOSIS — N39.0 URINARY TRACT INFECTION, SITE NOT SPECIFIED: ICD-10-CM

## 2022-11-13 LAB
CANDIDA VAG CYTO: NOT DETECTED
G VAGINALIS+PREV SP MTYP VAG QL MICRO: NOT DETECTED
T VAGINALIS VAG QL WET PREP: NOT DETECTED

## 2022-11-14 LAB — BACTERIA UR CULT: ABNORMAL

## 2023-01-31 ENCOUNTER — APPOINTMENT (OUTPATIENT)
Dept: ENDOCRINOLOGY | Facility: CLINIC | Age: 53
End: 2023-01-31
Payer: MEDICAID

## 2023-01-31 VITALS
HEART RATE: 69 BPM | DIASTOLIC BLOOD PRESSURE: 80 MMHG | WEIGHT: 184 LBS | BODY MASS INDEX: 30.66 KG/M2 | HEIGHT: 65 IN | SYSTOLIC BLOOD PRESSURE: 142 MMHG | OXYGEN SATURATION: 97 %

## 2023-01-31 DIAGNOSIS — E55.9 VITAMIN D DEFICIENCY, UNSPECIFIED: ICD-10-CM

## 2023-01-31 PROCEDURE — 99205 OFFICE O/P NEW HI 60 MIN: CPT

## 2023-05-03 ENCOUNTER — APPOINTMENT (OUTPATIENT)
Dept: INTERNAL MEDICINE | Facility: CLINIC | Age: 53
End: 2023-05-03
Payer: MEDICAID

## 2023-05-03 VITALS
HEART RATE: 66 BPM | WEIGHT: 184 LBS | HEIGHT: 65 IN | SYSTOLIC BLOOD PRESSURE: 138 MMHG | BODY MASS INDEX: 30.66 KG/M2 | OXYGEN SATURATION: 98 % | DIASTOLIC BLOOD PRESSURE: 78 MMHG

## 2023-05-03 DIAGNOSIS — L21.9 SEBORRHEIC DERMATITIS, UNSPECIFIED: ICD-10-CM

## 2023-05-03 DIAGNOSIS — K21.9 GASTRO-ESOPHAGEAL REFLUX DISEASE W/OUT ESOPHAGITIS: Chronic | ICD-10-CM

## 2023-05-03 DIAGNOSIS — K76.0 FATTY (CHANGE OF) LIVER, NOT ELSEWHERE CLASSIFIED: Chronic | ICD-10-CM

## 2023-05-03 DIAGNOSIS — N18.2 CHRONIC KIDNEY DISEASE, STAGE 2 (MILD): Chronic | ICD-10-CM

## 2023-05-03 DIAGNOSIS — I10 ESSENTIAL (PRIMARY) HYPERTENSION: Chronic | ICD-10-CM

## 2023-05-03 LAB — HBA1C MFR BLD HPLC: 7.7

## 2023-05-03 PROCEDURE — 99214 OFFICE O/P EST MOD 30 MIN: CPT | Mod: 25

## 2023-05-03 PROCEDURE — 83036 HEMOGLOBIN GLYCOSYLATED A1C: CPT | Mod: QW

## 2023-05-03 NOTE — HISTORY OF PRESENT ILLNESS
[FreeTextEntry1] : follow up  [de-identified] : Reports that her father passed away in his sleep recently which was very upsetting as she found him\par She is taking medications as prescribed other than statin which she is afraid of taking as she read it causes kidney problems online. \par She also has a little bit of a flaky rash on her left eyebrow

## 2023-05-03 NOTE — ASSESSMENT
[FreeTextEntry1] : 52F with HTN, uncontrolled T2DM, obesity, hypothyroidism, NAFLD, recent SUREKHA on CKD presents for follow up\par \par 1. CKD, HTN\par -c/w jardiance, c/w losartan 100 mg daily and carvedilol 25 mg BID. repeat labs today\par -pt to f/u with nephrology\par -c/w sodium bicarb\par \par 2. T2DM\par -a1c 05/31: 7.9%, 08/2022: 7.7%, 11/2022: 7.7. 05/2023: 7.7%\par -Currently on: basaglar 16 units, glipizide 10 mg, and janumet  mg once daily. - no glp 1 agonist given hx thyroid cancer. jardiance 10 mg daily (needs pr again July 2023)\par -based on ACC/AHA guidelines should be on statin; we discussed risks and benefits and started atorvastatin 20 mg but the patient is not ready to start the medication yet \par this time we reviewed how to find a reliable source online\par i printed patient information from up to date and circled other resources - NIH, AHA etc\par -foot exam: normal 05/22\par -eye exam: 11/2020; repeat this year. referral given\par -urine mircro alb/cr ratio; on losartan\par -prevnar 20- 5/2022\par -reviewed note from Dr. light 1/31/23; no changes to meds. \par \par 3. Obesity-pt working on diet/exercise. declined referral to obesity med\par -no ozempic due to thyroid ca\par -d/w pt orlistat, phentermine, topiramate- she wants to think about it before adding more medication\par \par 4. HCM\par -pap smear negative 09/21\par -FIT test - negative 08//2021; repeat this august. 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 \par -prevnar 20 given 05/2022\par -COVID vaccine completed, booster completed before September as well. \par -flu: 09/2022\par \par RTC in 3 months\par

## 2023-05-04 ENCOUNTER — TRANSCRIPTION ENCOUNTER (OUTPATIENT)
Age: 53
End: 2023-05-04

## 2023-05-04 LAB
ALBUMIN SERPL ELPH-MCNC: 4.7 G/DL
ALP BLD-CCNC: 81 U/L
ALT SERPL-CCNC: 11 U/L
ANION GAP SERPL CALC-SCNC: 15 MMOL/L
AST SERPL-CCNC: 12 U/L
BASOPHILS # BLD AUTO: 0.05 K/UL
BASOPHILS NFR BLD AUTO: 0.5 %
BILIRUB SERPL-MCNC: 0.5 MG/DL
BUN SERPL-MCNC: 19 MG/DL
CALCIUM SERPL-MCNC: 9.5 MG/DL
CHLORIDE SERPL-SCNC: 99 MMOL/L
CHOLEST SERPL-MCNC: 191 MG/DL
CO2 SERPL-SCNC: 22 MMOL/L
CREAT SERPL-MCNC: 1.43 MG/DL
CREAT SPEC-SCNC: 47 MG/DL
EGFR: 44 ML/MIN/1.73M2
EOSINOPHIL # BLD AUTO: 0.22 K/UL
EOSINOPHIL NFR BLD AUTO: 2.2 %
GLUCOSE SERPL-MCNC: 103 MG/DL
HCT VFR BLD CALC: 44.3 %
HDLC SERPL-MCNC: 55 MG/DL
HGB BLD-MCNC: 13.4 G/DL
IMM GRANULOCYTES NFR BLD AUTO: 0.4 %
LDLC SERPL CALC-MCNC: 99 MG/DL
LYMPHOCYTES # BLD AUTO: 4.22 K/UL
LYMPHOCYTES NFR BLD AUTO: 41.8 %
MAN DIFF?: NORMAL
MCHC RBC-ENTMCNC: 28.5 PG
MCHC RBC-ENTMCNC: 30.2 GM/DL
MCV RBC AUTO: 94.3 FL
MICROALBUMIN 24H UR DL<=1MG/L-MCNC: <1.2 MG/DL
MICROALBUMIN/CREAT 24H UR-RTO: NORMAL MG/G
MONOCYTES # BLD AUTO: 0.67 K/UL
MONOCYTES NFR BLD AUTO: 6.6 %
NEUTROPHILS # BLD AUTO: 4.9 K/UL
NEUTROPHILS NFR BLD AUTO: 48.5 %
NONHDLC SERPL-MCNC: 136 MG/DL
PLATELET # BLD AUTO: 254 K/UL
POTASSIUM SERPL-SCNC: 4.8 MMOL/L
PROT SERPL-MCNC: 7.9 G/DL
RBC # BLD: 4.7 M/UL
RBC # FLD: 13.4 %
SODIUM SERPL-SCNC: 136 MMOL/L
T4 FREE SERPL-MCNC: 1.9 NG/DL
TRIGL SERPL-MCNC: 184 MG/DL
TSH SERPL-ACNC: 0.09 UIU/ML
WBC # FLD AUTO: 10.1 K/UL

## 2023-05-04 RX ORDER — AMOXICILLIN AND CLAVULANATE POTASSIUM 500; 125 MG/1; MG/1
500-125 TABLET, FILM COATED ORAL
Qty: 10 | Refills: 0 | Status: COMPLETED | COMMUNITY
Start: 2022-11-13 | End: 2023-05-04

## 2023-05-08 ENCOUNTER — TRANSCRIPTION ENCOUNTER (OUTPATIENT)
Age: 53
End: 2023-05-08

## 2023-05-10 ENCOUNTER — APPOINTMENT (OUTPATIENT)
Dept: ENDOCRINOLOGY | Facility: CLINIC | Age: 53
End: 2023-05-10

## 2023-05-11 ENCOUNTER — TRANSCRIPTION ENCOUNTER (OUTPATIENT)
Age: 53
End: 2023-05-11

## 2023-05-11 RX ORDER — HYDROCORTISONE 25 MG/G
2.5 CREAM TOPICAL
Qty: 1 | Refills: 0 | Status: ACTIVE | COMMUNITY
Start: 2023-05-11 | End: 1900-01-01

## 2023-05-18 ENCOUNTER — TRANSCRIPTION ENCOUNTER (OUTPATIENT)
Age: 53
End: 2023-05-18

## 2023-06-09 ENCOUNTER — TRANSCRIPTION ENCOUNTER (OUTPATIENT)
Age: 53
End: 2023-06-09

## 2023-06-22 ENCOUNTER — TRANSCRIPTION ENCOUNTER (OUTPATIENT)
Age: 53
End: 2023-06-22

## 2023-07-03 RX ORDER — KETOCONAZOLE 20 MG/G
2 CREAM TOPICAL TWICE DAILY
Qty: 1 | Refills: 1 | Status: ACTIVE | COMMUNITY
Start: 2023-05-03 | End: 1900-01-01

## 2023-08-03 ENCOUNTER — APPOINTMENT (OUTPATIENT)
Dept: ENDOCRINOLOGY | Facility: CLINIC | Age: 53
End: 2023-08-03

## 2023-10-09 ENCOUNTER — TRANSCRIPTION ENCOUNTER (OUTPATIENT)
Age: 53
End: 2023-10-09

## 2023-10-09 RX ORDER — LANCETS
EACH MISCELLANEOUS
Qty: 90 | Refills: 3 | Status: ACTIVE | COMMUNITY
Start: 2021-07-13 | End: 1900-01-01

## 2023-10-09 RX ORDER — BLOOD SUGAR DIAGNOSTIC
STRIP MISCELLANEOUS TWICE DAILY
Qty: 2 | Refills: 3 | Status: ACTIVE | COMMUNITY
Start: 2022-05-16 | End: 1900-01-01

## 2023-10-09 RX ORDER — GLIPIZIDE 10 MG/1
10 TABLET ORAL
Qty: 180 | Refills: 3 | Status: ACTIVE | COMMUNITY
Start: 2022-04-17 | End: 1900-01-01

## 2023-11-30 ENCOUNTER — APPOINTMENT (OUTPATIENT)
Dept: INTERNAL MEDICINE | Facility: CLINIC | Age: 53
End: 2023-11-30

## 2024-01-11 ENCOUNTER — OUTPATIENT (OUTPATIENT)
Dept: OUTPATIENT SERVICES | Facility: HOSPITAL | Age: 54
LOS: 1 days | End: 2024-01-11
Payer: MEDICAID

## 2024-01-11 ENCOUNTER — APPOINTMENT (OUTPATIENT)
Dept: INTERNAL MEDICINE | Facility: CLINIC | Age: 54
End: 2024-01-11
Payer: MEDICAID

## 2024-01-11 VITALS
HEART RATE: 68 BPM | DIASTOLIC BLOOD PRESSURE: 80 MMHG | OXYGEN SATURATION: 98 % | BODY MASS INDEX: 31.65 KG/M2 | WEIGHT: 190 LBS | SYSTOLIC BLOOD PRESSURE: 150 MMHG | HEIGHT: 65 IN

## 2024-01-11 DIAGNOSIS — E89.0 POSTPROCEDURAL HYPOTHYROIDISM: ICD-10-CM

## 2024-01-11 DIAGNOSIS — Z90.09 ACQUIRED ABSENCE OF OTHER PART OF HEAD AND NECK: Chronic | ICD-10-CM

## 2024-01-11 DIAGNOSIS — Z79.4 LONG TERM (CURRENT) USE OF INSULIN: ICD-10-CM

## 2024-01-11 DIAGNOSIS — Z90.79 ACQUIRED ABSENCE OF OTHER GENITAL ORGAN(S): Chronic | ICD-10-CM

## 2024-01-11 DIAGNOSIS — Z79.4 TYPE 2 DIABETES MELLITUS WITH OTHER SPECIFIED COMPLICATION: ICD-10-CM

## 2024-01-11 DIAGNOSIS — E11.69 TYPE 2 DIABETES MELLITUS WITH OTHER SPECIFIED COMPLICATION: ICD-10-CM

## 2024-01-11 DIAGNOSIS — I10 ESSENTIAL (PRIMARY) HYPERTENSION: ICD-10-CM

## 2024-01-11 DIAGNOSIS — Z90.49 ACQUIRED ABSENCE OF OTHER SPECIFIED PARTS OF DIGESTIVE TRACT: Chronic | ICD-10-CM

## 2024-01-11 DIAGNOSIS — Z00.00 ENCOUNTER FOR GENERAL ADULT MEDICAL EXAMINATION W/OUT ABNORMAL FINDINGS: ICD-10-CM

## 2024-01-11 PROCEDURE — 80053 COMPREHEN METABOLIC PANEL: CPT

## 2024-01-11 PROCEDURE — 83721 ASSAY OF BLOOD LIPOPROTEIN: CPT

## 2024-01-11 PROCEDURE — 80061 LIPID PANEL: CPT

## 2024-01-11 PROCEDURE — 36415 COLL VENOUS BLD VENIPUNCTURE: CPT

## 2024-01-11 PROCEDURE — 84443 ASSAY THYROID STIM HORMONE: CPT

## 2024-01-11 PROCEDURE — 81001 URINALYSIS AUTO W/SCOPE: CPT

## 2024-01-11 PROCEDURE — 99215 OFFICE O/P EST HI 40 MIN: CPT | Mod: 25

## 2024-01-11 PROCEDURE — 82985 ASSAY OF GLYCATED PROTEIN: CPT

## 2024-01-11 PROCEDURE — 84432 ASSAY OF THYROGLOBULIN: CPT

## 2024-01-11 PROCEDURE — 84439 ASSAY OF FREE THYROXINE: CPT

## 2024-01-11 PROCEDURE — 82043 UR ALBUMIN QUANTITATIVE: CPT

## 2024-01-11 PROCEDURE — 85027 COMPLETE CBC AUTOMATED: CPT

## 2024-01-11 PROCEDURE — 84481 FREE ASSAY (FT-3): CPT

## 2024-01-11 RX ORDER — ATORVASTATIN CALCIUM 40 MG/1
40 TABLET, FILM COATED ORAL
Qty: 90 | Refills: 3 | Status: ACTIVE | COMMUNITY
Start: 2021-04-12 | End: 1900-01-01

## 2024-01-12 PROBLEM — E11.69: Status: ACTIVE | Noted: 2021-06-21

## 2024-01-12 PROBLEM — E89.0 HYPOTHYROIDISM, POSTSURGICAL: Status: ACTIVE | Noted: 2020-10-22

## 2024-01-12 RX ORDER — INSULIN GLARGINE 100 [IU]/ML
100 INJECTION, SOLUTION SUBCUTANEOUS
Qty: 3 | Refills: 0 | Status: ACTIVE | COMMUNITY
Start: 2022-04-24

## 2024-01-12 NOTE — HEALTH RISK ASSESSMENT
[No] : No [0] : 2) Feeling down, depressed, or hopeless: Not at all (0) [PHQ-2 Negative - No further assessment needed] : PHQ-2 Negative - No further assessment needed [Never] : Never [Audit-CScore] : 0 [[id number]] : 0

## 2024-01-12 NOTE — HISTORY OF PRESENT ILLNESS
[de-identified] : 54 yo F from [facility] at age 23 with T2DM, HTN, HLD, obesity, CKD3, hypothyroid, here to establish care, with me as her PCP no longer in this practice. 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 Admits to not following ADA diet with A1c ranging 8.8 to 7.7 May 2023, today 9.3%, Glucose 132 at 3pm today after slice of bread 8am, cookie at 1pm and some water. Has dinner usually with rice, some veggies, and veggie. Meds as outlined. Needs refill of some meds as nearly done and needs refills..

## 2024-01-16 ENCOUNTER — TRANSCRIPTION ENCOUNTER (OUTPATIENT)
Age: 54
End: 2024-01-16

## 2024-01-16 RX ORDER — SITAGLIPTIN AND METFORMIN HYDROCHLORIDE 50; 500 MG/1; MG/1
50-500 TABLET, FILM COATED ORAL DAILY
Qty: 90 | Refills: 1 | Status: ACTIVE | COMMUNITY
Start: 1900-01-01 | End: 1900-01-01

## 2024-01-17 ENCOUNTER — TRANSCRIPTION ENCOUNTER (OUTPATIENT)
Age: 54
End: 2024-01-17

## 2024-01-17 RX ORDER — FAMOTIDINE 20 MG/1
20 TABLET, FILM COATED ORAL DAILY
Qty: 90 | Refills: 3 | Status: ACTIVE | COMMUNITY
Start: 2021-08-27 | End: 1900-01-01

## 2024-01-19 ENCOUNTER — TRANSCRIPTION ENCOUNTER (OUTPATIENT)
Age: 54
End: 2024-01-19

## 2024-01-19 LAB
ALBUMIN SERPL ELPH-MCNC: 4.7 G/DL
ALP BLD-CCNC: 81 U/L
ALT SERPL-CCNC: 14 U/L
ANION GAP SERPL CALC-SCNC: 16 MMOL/L
APPEARANCE: CLEAR
AST SERPL-CCNC: 13 U/L
BACTERIA: ABNORMAL /HPF
BILIRUB SERPL-MCNC: 0.5 MG/DL
BILIRUBIN URINE: NEGATIVE
BLOOD URINE: NEGATIVE
BUN SERPL-MCNC: 25 MG/DL
CALCIUM SERPL-MCNC: 9.3 MG/DL
CAST: 0 /LPF
CHLORIDE SERPL-SCNC: 98 MMOL/L
CHOLEST SERPL-MCNC: 213 MG/DL
CO2 SERPL-SCNC: 21 MMOL/L
COLOR: YELLOW
CREAT SERPL-MCNC: 1.42 MG/DL
CREAT SPEC-SCNC: 32 MG/DL
EGFR: 44 ML/MIN/1.73M2
EPITHELIAL CELLS: 8 /HPF
FRUCTOSAMINE SERPL-MCNC: 373 UMOL/L
GLUCOSE BLDC GLUCOMTR-MCNC: 132
GLUCOSE QUALITATIVE U: >=1000 MG/DL
GLUCOSE SERPL-MCNC: 128 MG/DL
HBA1C MFR BLD HPLC: 9.3
HCT VFR BLD CALC: 42.6 %
HDLC SERPL-MCNC: 58 MG/DL
HGB BLD-MCNC: 13.3 G/DL
KETONES URINE: NEGATIVE MG/DL
LDLC SERPL CALC-MCNC: 121 MG/DL
LDLC SERPL DIRECT ASSAY-MCNC: 130 MG/DL
LEUKOCYTE ESTERASE URINE: ABNORMAL
MCHC RBC-ENTMCNC: 28.4 PG
MCHC RBC-ENTMCNC: 31.2 GM/DL
MCV RBC AUTO: 91 FL
MICROALBUMIN 24H UR DL<=1MG/L-MCNC: <1.2 MG/DL
MICROALBUMIN/CREAT 24H UR-RTO: NORMAL MG/G
MICROSCOPIC-UA: NORMAL
NITRITE URINE: NEGATIVE
NONHDLC SERPL-MCNC: 156 MG/DL
PH URINE: 6
PLATELET # BLD AUTO: 264 K/UL
POTASSIUM SERPL-SCNC: 4.7 MMOL/L
PROT SERPL-MCNC: 8.1 G/DL
PROTEIN URINE: NEGATIVE MG/DL
RBC # BLD: 4.68 M/UL
RBC # FLD: 14.2 %
RED BLOOD CELLS URINE: 2 /HPF
SODIUM SERPL-SCNC: 135 MMOL/L
SPECIFIC GRAVITY URINE: 1.01
T3FREE SERPL-MCNC: 2.11 PG/ML
T4 FREE SERPL-MCNC: 1.8 NG/DL
THYROGLOB AB SERPL-ACNC: <20 IU/ML
THYROGLOB SERPL-MCNC: <0.2 NG/ML
TRIGL SERPL-MCNC: 197 MG/DL
TSH SERPL-ACNC: 0.77 UIU/ML
UROBILINOGEN URINE: 0.2 MG/DL
WBC # FLD AUTO: 10.65 K/UL
WHITE BLOOD CELLS URINE: 58 /HPF

## 2024-01-21 ENCOUNTER — TRANSCRIPTION ENCOUNTER (OUTPATIENT)
Age: 54
End: 2024-01-21

## 2024-02-02 ENCOUNTER — APPOINTMENT (OUTPATIENT)
Dept: MAMMOGRAPHY | Facility: IMAGING CENTER | Age: 54
End: 2024-02-02
Payer: MEDICAID

## 2024-02-02 ENCOUNTER — OUTPATIENT (OUTPATIENT)
Dept: OUTPATIENT SERVICES | Facility: HOSPITAL | Age: 54
LOS: 1 days | End: 2024-02-02
Payer: MEDICAID

## 2024-02-02 ENCOUNTER — RESULT REVIEW (OUTPATIENT)
Age: 54
End: 2024-02-02

## 2024-02-02 DIAGNOSIS — Z90.49 ACQUIRED ABSENCE OF OTHER SPECIFIED PARTS OF DIGESTIVE TRACT: Chronic | ICD-10-CM

## 2024-02-02 DIAGNOSIS — Z90.09 ACQUIRED ABSENCE OF OTHER PART OF HEAD AND NECK: Chronic | ICD-10-CM

## 2024-02-02 DIAGNOSIS — Z00.8 ENCOUNTER FOR OTHER GENERAL EXAMINATION: ICD-10-CM

## 2024-02-02 DIAGNOSIS — Z90.79 ACQUIRED ABSENCE OF OTHER GENITAL ORGAN(S): Chronic | ICD-10-CM

## 2024-02-02 PROCEDURE — 77063 BREAST TOMOSYNTHESIS BI: CPT

## 2024-02-02 PROCEDURE — 77067 SCR MAMMO BI INCL CAD: CPT | Mod: 26

## 2024-02-02 PROCEDURE — 77067 SCR MAMMO BI INCL CAD: CPT

## 2024-02-02 PROCEDURE — 77063 BREAST TOMOSYNTHESIS BI: CPT | Mod: 26

## 2024-02-05 ENCOUNTER — TRANSCRIPTION ENCOUNTER (OUTPATIENT)
Age: 54
End: 2024-02-05

## 2024-02-15 ENCOUNTER — APPOINTMENT (OUTPATIENT)
Dept: CARDIOLOGY | Facility: CLINIC | Age: 54
End: 2024-02-15
Payer: MEDICAID

## 2024-02-15 ENCOUNTER — NON-APPOINTMENT (OUTPATIENT)
Age: 54
End: 2024-02-15

## 2024-02-15 VITALS
HEIGHT: 65 IN | DIASTOLIC BLOOD PRESSURE: 80 MMHG | HEART RATE: 68 BPM | OXYGEN SATURATION: 98 % | SYSTOLIC BLOOD PRESSURE: 158 MMHG | WEIGHT: 190 LBS | BODY MASS INDEX: 31.65 KG/M2

## 2024-02-15 DIAGNOSIS — I10 ESSENTIAL (PRIMARY) HYPERTENSION: ICD-10-CM

## 2024-02-15 DIAGNOSIS — Z91.89 OTHER SPECIFIED PERSONAL RISK FACTORS, NOT ELSEWHERE CLASSIFIED: ICD-10-CM

## 2024-02-15 DIAGNOSIS — E66.9 OBESITY, UNSPECIFIED: ICD-10-CM

## 2024-02-15 DIAGNOSIS — M54.12 RADICULOPATHY, CERVICAL REGION: ICD-10-CM

## 2024-02-15 DIAGNOSIS — N18.9 CHRONIC KIDNEY DISEASE, UNSPECIFIED: ICD-10-CM

## 2024-02-15 DIAGNOSIS — E78.00 PURE HYPERCHOLESTEROLEMIA, UNSPECIFIED: ICD-10-CM

## 2024-02-15 PROCEDURE — 99214 OFFICE O/P EST MOD 30 MIN: CPT | Mod: 25

## 2024-02-15 PROCEDURE — 93000 ELECTROCARDIOGRAM COMPLETE: CPT

## 2024-02-15 PROCEDURE — G2211 COMPLEX E/M VISIT ADD ON: CPT | Mod: NC,1L

## 2024-02-15 RX ORDER — LOSARTAN POTASSIUM 100 MG/1
100 TABLET, FILM COATED ORAL
Qty: 1 | Refills: 3 | Status: ACTIVE | COMMUNITY
Start: 1900-01-01 | End: 1900-01-01

## 2024-02-15 RX ORDER — CARVEDILOL 25 MG/1
25 TABLET, FILM COATED ORAL
Qty: 180 | Refills: 3 | Status: ACTIVE | COMMUNITY
Start: 2020-11-03 | End: 1900-01-01

## 2024-02-15 NOTE — DISCUSSION/SUMMARY
[FreeTextEntry1] : Ms Holland remains without any cardiac complaints.  Her exam shows a repeat blood pressure of 140/80 (she gets 130/80 at home), BMI of 31, clear lungs, and an unchanged systolic murmur.  Her EKG remains within normal limits.  Her cardiac status remains stable.  Her diabetic control remains poor and she has not lost any weight.  I emphasized the need for her to try to get better control this risk factor.  She will continue on Lipitor, insulin, glipizide, Jardiance, Janumet, carvedilol, and losartan.  She will follow-up in 6 months. [EKG obtained to assist in diagnosis and management of assessed problem(s)] : EKG obtained to assist in diagnosis and management of assessed problem(s)

## 2024-02-15 NOTE — HISTORY OF PRESENT ILLNESS
[FreeTextEntry1] : 53-year-old female with a history of hypertension, hypercholesterolemia, diabetes, obesity, CKD.  She was last seen in 722.  She has never had symptoms of heart disease and remains without any exertional complaints.  She has gained some weight over the past year.  Her most recent blood work shows poor control of her diabetes with an A1c of 9.3.  Her LDL cholesterol was 121 on Lipitor 20.  She is a creatinine of 1.42 and a GFR of 44.

## 2024-04-04 ENCOUNTER — TRANSCRIPTION ENCOUNTER (OUTPATIENT)
Age: 54
End: 2024-04-04

## 2024-04-04 RX ORDER — LEVOTHYROXINE SODIUM 0.14 MG/1
137 TABLET ORAL
Qty: 90 | Refills: 3 | Status: ACTIVE | COMMUNITY
Start: 1900-01-01 | End: 1900-01-01

## 2024-05-04 NOTE — PROGRESS NOTE ADULT - PROBLEM SELECTOR PLAN 5
- TSH wnl  - c/w levothyroxine
- TSH wnl  - c/w levothyroxine
Yes - the patient is able to be screened

## 2024-05-10 ENCOUNTER — APPOINTMENT (OUTPATIENT)
Dept: NEPHROLOGY | Facility: CLINIC | Age: 54
End: 2024-05-10

## 2024-05-27 NOTE — HISTORY OF PRESENT ILLNESS
[FreeTextEntry1] : f/u [de-identified] : Ms. GUS PARDO is a 53 year old White  female  with history of  presented today for f/u.

## 2024-05-29 ENCOUNTER — APPOINTMENT (OUTPATIENT)
Dept: INTERNAL MEDICINE | Facility: CLINIC | Age: 54
End: 2024-05-29

## 2024-05-30 ENCOUNTER — APPOINTMENT (OUTPATIENT)
Dept: INTERNAL MEDICINE | Facility: CLINIC | Age: 54
End: 2024-05-30

## 2024-06-30 ENCOUNTER — TRANSCRIPTION ENCOUNTER (OUTPATIENT)
Age: 54
End: 2024-06-30

## 2024-07-02 ENCOUNTER — RX RENEWAL (OUTPATIENT)
Age: 54
End: 2024-07-02

## 2024-07-10 ENCOUNTER — OUTPATIENT (OUTPATIENT)
Dept: OUTPATIENT SERVICES | Facility: HOSPITAL | Age: 54
LOS: 1 days | End: 2024-07-10
Payer: MEDICAID

## 2024-07-10 ENCOUNTER — APPOINTMENT (OUTPATIENT)
Dept: INTERNAL MEDICINE | Facility: CLINIC | Age: 54
End: 2024-07-10
Payer: COMMERCIAL

## 2024-07-10 VITALS
BODY MASS INDEX: 32.45 KG/M2 | WEIGHT: 195 LBS | HEART RATE: 69 BPM | DIASTOLIC BLOOD PRESSURE: 80 MMHG | SYSTOLIC BLOOD PRESSURE: 160 MMHG | OXYGEN SATURATION: 98 %

## 2024-07-10 DIAGNOSIS — E11.69 TYPE 2 DIABETES MELLITUS WITH OTHER SPECIFIED COMPLICATION: ICD-10-CM

## 2024-07-10 DIAGNOSIS — E78.00 PURE HYPERCHOLESTEROLEMIA, UNSPECIFIED: ICD-10-CM

## 2024-07-10 DIAGNOSIS — E87.1 HYPO-OSMOLALITY AND HYPONATREMIA: ICD-10-CM

## 2024-07-10 DIAGNOSIS — Z79.4 TYPE 2 DIABETES MELLITUS WITH OTHER SPECIFIED COMPLICATION: ICD-10-CM

## 2024-07-10 DIAGNOSIS — Z90.49 ACQUIRED ABSENCE OF OTHER SPECIFIED PARTS OF DIGESTIVE TRACT: Chronic | ICD-10-CM

## 2024-07-10 DIAGNOSIS — Z90.79 ACQUIRED ABSENCE OF OTHER GENITAL ORGAN(S): Chronic | ICD-10-CM

## 2024-07-10 DIAGNOSIS — I10 ESSENTIAL (PRIMARY) HYPERTENSION: ICD-10-CM

## 2024-07-10 DIAGNOSIS — Z90.09 ACQUIRED ABSENCE OF OTHER PART OF HEAD AND NECK: Chronic | ICD-10-CM

## 2024-07-10 PROCEDURE — 99214 OFFICE O/P EST MOD 30 MIN: CPT

## 2024-07-10 PROCEDURE — G0463: CPT

## 2024-07-10 RX ORDER — INSULIN GLARGINE 100 [IU]/ML
100 INJECTION, SOLUTION SUBCUTANEOUS
Qty: 3 | Refills: 3 | Status: ACTIVE | COMMUNITY
Start: 2024-07-10 | End: 1900-01-01

## 2024-07-11 ENCOUNTER — TRANSCRIPTION ENCOUNTER (OUTPATIENT)
Age: 54
End: 2024-07-11

## 2024-07-16 ENCOUNTER — TRANSCRIPTION ENCOUNTER (OUTPATIENT)
Age: 54
End: 2024-07-16

## 2024-07-16 PROBLEM — E87.1 HYPONATREMIA: Status: ACTIVE | Noted: 2020-10-22

## 2024-07-16 LAB
ALBUMIN SERPL ELPH-MCNC: 4.5 G/DL
ALP BLD-CCNC: 81 U/L
ALT SERPL-CCNC: 14 U/L
ANION GAP SERPL CALC-SCNC: 16 MMOL/L
AST SERPL-CCNC: 14 U/L
BASOPHILS # BLD AUTO: 0.06 K/UL
BILIRUB SERPL-MCNC: 0.3 MG/DL
BUN SERPL-MCNC: 23 MG/DL
CALCIUM SERPL-MCNC: 10 MG/DL
CHOLEST SERPL-MCNC: 188 MG/DL
CO2 SERPL-SCNC: 20 MMOL/L
CREAT SERPL-MCNC: 1.45 MG/DL
EGFR: 43 ML/MIN/1.73M2
EOSINOPHIL # BLD AUTO: 0.21 K/UL
EOSINOPHIL NFR BLD AUTO: 1.9 %
ESTIMATED AVERAGE GLUCOSE: 232 MG/DL
GLUCOSE SERPL-MCNC: 139 MG/DL
HBA1C MFR BLD HPLC: 9.7 %
HCT VFR BLD CALC: 42.1 %
HDLC SERPL-MCNC: 57 MG/DL
HGB BLD-MCNC: 13 G/DL
IMM GRANULOCYTES NFR BLD AUTO: 0.8 %
LDLC SERPL CALC-MCNC: 99 MG/DL
LYMPHOCYTES # BLD AUTO: 3.46 K/UL
LYMPHOCYTES NFR BLD AUTO: 31.5 %
MAN DIFF?: NORMAL
MCHC RBC-ENTMCNC: 29.1 PG
MCHC RBC-ENTMCNC: 30.9 GM/DL
MCV RBC AUTO: 94.4 FL
MONOCYTES # BLD AUTO: 0.6 K/UL
MONOCYTES NFR BLD AUTO: 5.5 %
NEUTROPHILS # BLD AUTO: 6.57 K/UL
NEUTROPHILS NFR BLD AUTO: 59.8 %
NONHDLC SERPL-MCNC: 131 MG/DL
PLATELET # BLD AUTO: 235 K/UL
POTASSIUM SERPL-SCNC: 5 MMOL/L
PROT SERPL-MCNC: 7.8 G/DL
RBC # BLD: 4.46 M/UL
RBC # FLD: 13.8 %
SODIUM SERPL-SCNC: 134 MMOL/L
T4 FREE SERPL-MCNC: 1.7 NG/DL
TRIGL SERPL-MCNC: 188 MG/DL
TSH SERPL-ACNC: 0.99 UIU/ML
WBC # FLD AUTO: 10.99 K/UL

## 2024-07-17 NOTE — ASSESSMENT
[FreeTextEntry1] : Ms Holland is feeling generally well at present.  Her home BPs have averaged a little high, although she has normal at present.  Her exam is otherwise unremarkable with clear lungs and a normal cardiac exam.\par \par She is concerned about her electrolytes and lymphocytes so blood was drawn.\par \par Since her blood pressure appears to be borderline elevated amlodipine 2.5 mg was added to her regimen.  She will call back in a week.  She will follow-up in 4 months.
No
Pt will ambulate 200-300 ft independently with RW in 2 weeks

## 2024-07-18 ENCOUNTER — APPOINTMENT (OUTPATIENT)
Dept: ENDOCRINOLOGY | Facility: CLINIC | Age: 54
End: 2024-07-18

## 2024-07-19 ENCOUNTER — APPOINTMENT (OUTPATIENT)
Dept: INTERNAL MEDICINE | Facility: CLINIC | Age: 54
End: 2024-07-19

## 2024-07-22 ENCOUNTER — TRANSCRIPTION ENCOUNTER (OUTPATIENT)
Age: 54
End: 2024-07-22

## 2024-07-22 DIAGNOSIS — E78.00 PURE HYPERCHOLESTEROLEMIA, UNSPECIFIED: ICD-10-CM

## 2024-07-22 DIAGNOSIS — Z79.4 LONG TERM (CURRENT) USE OF INSULIN: ICD-10-CM

## 2024-07-22 DIAGNOSIS — E11.69 TYPE 2 DIABETES MELLITUS WITH OTHER SPECIFIED COMPLICATION: ICD-10-CM

## 2024-07-22 DIAGNOSIS — E87.1 HYPO-OSMOLALITY AND HYPONATREMIA: ICD-10-CM

## 2024-07-26 ENCOUNTER — APPOINTMENT (OUTPATIENT)
Dept: INTERNAL MEDICINE | Facility: CLINIC | Age: 54
End: 2024-07-26
Payer: COMMERCIAL

## 2024-07-26 VITALS
DIASTOLIC BLOOD PRESSURE: 73 MMHG | OXYGEN SATURATION: 98 % | SYSTOLIC BLOOD PRESSURE: 156 MMHG | BODY MASS INDEX: 32.32 KG/M2 | HEART RATE: 71 BPM | WEIGHT: 194 LBS | HEIGHT: 65 IN

## 2024-07-26 DIAGNOSIS — E66.9 OBESITY, UNSPECIFIED: ICD-10-CM

## 2024-07-26 DIAGNOSIS — L65.9 NONSCARRING HAIR LOSS, UNSPECIFIED: ICD-10-CM

## 2024-07-26 DIAGNOSIS — K21.9 GASTRO-ESOPHAGEAL REFLUX DISEASE W/OUT ESOPHAGITIS: ICD-10-CM

## 2024-07-26 DIAGNOSIS — Z79.4 TYPE 2 DIABETES MELLITUS WITH OTHER SPECIFIED COMPLICATION: ICD-10-CM

## 2024-07-26 DIAGNOSIS — E11.69 TYPE 2 DIABETES MELLITUS WITH OTHER SPECIFIED COMPLICATION: ICD-10-CM

## 2024-07-26 DIAGNOSIS — R68.81 EARLY SATIETY: ICD-10-CM

## 2024-07-26 DIAGNOSIS — I10 ESSENTIAL (PRIMARY) HYPERTENSION: ICD-10-CM

## 2024-07-26 DIAGNOSIS — N18.9 CHRONIC KIDNEY DISEASE, UNSPECIFIED: ICD-10-CM

## 2024-07-26 PROCEDURE — 99215 OFFICE O/P EST HI 40 MIN: CPT | Mod: 25

## 2024-07-26 PROCEDURE — G2211 COMPLEX E/M VISIT ADD ON: CPT | Mod: NC

## 2024-07-26 PROCEDURE — G0447 BEHAVIOR COUNSEL OBESITY 15M: CPT | Mod: 59

## 2024-08-15 ENCOUNTER — APPOINTMENT (OUTPATIENT)
Dept: CARDIOLOGY | Facility: CLINIC | Age: 54
End: 2024-08-15

## 2024-08-23 ENCOUNTER — APPOINTMENT (OUTPATIENT)
Dept: CARDIOLOGY | Facility: CLINIC | Age: 54
End: 2024-08-23

## 2024-08-30 ENCOUNTER — TRANSCRIPTION ENCOUNTER (OUTPATIENT)
Age: 54
End: 2024-08-30

## 2024-10-11 ENCOUNTER — APPOINTMENT (OUTPATIENT)
Dept: CARDIOLOGY | Facility: CLINIC | Age: 54
End: 2024-10-11

## 2024-10-18 ENCOUNTER — NON-APPOINTMENT (OUTPATIENT)
Age: 54
End: 2024-10-18

## 2024-10-18 ENCOUNTER — APPOINTMENT (OUTPATIENT)
Dept: CARDIOLOGY | Facility: CLINIC | Age: 54
End: 2024-10-18
Payer: COMMERCIAL

## 2024-10-18 VITALS
HEIGHT: 65 IN | DIASTOLIC BLOOD PRESSURE: 76 MMHG | HEART RATE: 77 BPM | BODY MASS INDEX: 32.99 KG/M2 | WEIGHT: 198 LBS | OXYGEN SATURATION: 95 % | SYSTOLIC BLOOD PRESSURE: 130 MMHG

## 2024-10-18 DIAGNOSIS — N18.2 CHRONIC KIDNEY DISEASE, STAGE 2 (MILD): ICD-10-CM

## 2024-10-18 DIAGNOSIS — E11.69 TYPE 2 DIABETES MELLITUS WITH OTHER SPECIFIED COMPLICATION: ICD-10-CM

## 2024-10-18 DIAGNOSIS — Z79.4 TYPE 2 DIABETES MELLITUS WITH OTHER SPECIFIED COMPLICATION: ICD-10-CM

## 2024-10-18 DIAGNOSIS — I10 ESSENTIAL (PRIMARY) HYPERTENSION: ICD-10-CM

## 2024-10-18 DIAGNOSIS — E78.00 PURE HYPERCHOLESTEROLEMIA, UNSPECIFIED: ICD-10-CM

## 2024-10-18 PROCEDURE — 99214 OFFICE O/P EST MOD 30 MIN: CPT | Mod: 25

## 2024-10-18 PROCEDURE — 93000 ELECTROCARDIOGRAM COMPLETE: CPT

## 2024-11-01 ENCOUNTER — APPOINTMENT (OUTPATIENT)
Dept: INTERNAL MEDICINE | Facility: CLINIC | Age: 54
End: 2024-11-01

## 2025-02-21 ENCOUNTER — APPOINTMENT (OUTPATIENT)
Dept: INTERNAL MEDICINE | Facility: CLINIC | Age: 55
End: 2025-02-21

## 2025-03-14 ENCOUNTER — APPOINTMENT (OUTPATIENT)
Dept: INTERNAL MEDICINE | Facility: CLINIC | Age: 55
End: 2025-03-14

## 2025-04-18 ENCOUNTER — NON-APPOINTMENT (OUTPATIENT)
Age: 55
End: 2025-04-18

## 2025-04-18 ENCOUNTER — APPOINTMENT (OUTPATIENT)
Dept: CARDIOLOGY | Facility: CLINIC | Age: 55
End: 2025-04-18
Payer: COMMERCIAL

## 2025-04-18 VITALS
SYSTOLIC BLOOD PRESSURE: 126 MMHG | WEIGHT: 192 LBS | DIASTOLIC BLOOD PRESSURE: 78 MMHG | HEIGHT: 65 IN | BODY MASS INDEX: 31.99 KG/M2 | OXYGEN SATURATION: 97 % | HEART RATE: 72 BPM

## 2025-04-18 DIAGNOSIS — Z91.89 OTHER SPECIFIED PERSONAL RISK FACTORS, NOT ELSEWHERE CLASSIFIED: ICD-10-CM

## 2025-04-18 DIAGNOSIS — E66.9 OBESITY, UNSPECIFIED: ICD-10-CM

## 2025-04-18 DIAGNOSIS — M54.12 RADICULOPATHY, CERVICAL REGION: ICD-10-CM

## 2025-04-18 DIAGNOSIS — N18.9 CHRONIC KIDNEY DISEASE, UNSPECIFIED: ICD-10-CM

## 2025-04-18 DIAGNOSIS — I10 ESSENTIAL (PRIMARY) HYPERTENSION: ICD-10-CM

## 2025-04-18 DIAGNOSIS — E78.00 PURE HYPERCHOLESTEROLEMIA, UNSPECIFIED: ICD-10-CM

## 2025-04-18 PROCEDURE — 99214 OFFICE O/P EST MOD 30 MIN: CPT | Mod: 25

## 2025-04-18 PROCEDURE — 93000 ELECTROCARDIOGRAM COMPLETE: CPT

## 2025-04-21 LAB
ALBUMIN SERPL ELPH-MCNC: 4.6 G/DL
ALP BLD-CCNC: 90 U/L
ALT SERPL-CCNC: 11 U/L
ANION GAP SERPL CALC-SCNC: 18 MMOL/L
AST SERPL-CCNC: 13 U/L
BILIRUB SERPL-MCNC: 0.3 MG/DL
BUN SERPL-MCNC: 29 MG/DL
CALCIUM SERPL-MCNC: 9.5 MG/DL
CHLORIDE SERPL-SCNC: 98 MMOL/L
CHOLEST SERPL-MCNC: 202 MG/DL
CO2 SERPL-SCNC: 20 MMOL/L
CREAT SERPL-MCNC: 1.62 MG/DL
EGFRCR SERPLBLD CKD-EPI 2021: 37 ML/MIN/1.73M2
ESTIMATED AVERAGE GLUCOSE: 263 MG/DL
GLUCOSE SERPL-MCNC: 130 MG/DL
HBA1C MFR BLD HPLC: 10.8 %
HCT VFR BLD CALC: 42.6 %
HDLC SERPL-MCNC: 58 MG/DL
HGB BLD-MCNC: 13.7 G/DL
LDLC SERPL-MCNC: 106 MG/DL
MCHC RBC-ENTMCNC: 28.9 PG
MCHC RBC-ENTMCNC: 32.2 G/DL
MCV RBC AUTO: 89.9 FL
NONHDLC SERPL-MCNC: 144 MG/DL
PLATELET # BLD AUTO: 241 K/UL
POTASSIUM SERPL-SCNC: 5.2 MMOL/L
PROT SERPL-MCNC: 7.6 G/DL
RBC # BLD: 4.74 M/UL
RBC # FLD: 13.7 %
SODIUM SERPL-SCNC: 135 MMOL/L
TRIGL SERPL-MCNC: 225 MG/DL
TSH SERPL-ACNC: 0.2 UIU/ML
WBC # FLD AUTO: 10.58 K/UL

## 2025-06-06 ENCOUNTER — APPOINTMENT (OUTPATIENT)
Dept: INTERNAL MEDICINE | Facility: CLINIC | Age: 55
End: 2025-06-06

## 2025-06-13 ENCOUNTER — APPOINTMENT (OUTPATIENT)
Dept: INTERNAL MEDICINE | Facility: CLINIC | Age: 55
End: 2025-06-13

## 2025-06-16 ENCOUNTER — APPOINTMENT (OUTPATIENT)
Dept: OBGYN | Facility: CLINIC | Age: 55
End: 2025-06-16